# Patient Record
Sex: MALE | Race: WHITE | NOT HISPANIC OR LATINO | Employment: OTHER | URBAN - METROPOLITAN AREA
[De-identification: names, ages, dates, MRNs, and addresses within clinical notes are randomized per-mention and may not be internally consistent; named-entity substitution may affect disease eponyms.]

---

## 2023-01-01 ENCOUNTER — APPOINTMENT (OUTPATIENT)
Dept: RADIOLOGY | Facility: MEDICAL CENTER | Age: 63
DRG: 438 | End: 2023-01-01
Attending: STUDENT IN AN ORGANIZED HEALTH CARE EDUCATION/TRAINING PROGRAM
Payer: MEDICARE

## 2023-01-01 ENCOUNTER — APPOINTMENT (OUTPATIENT)
Dept: RADIOLOGY | Facility: MEDICAL CENTER | Age: 63
DRG: 438 | End: 2023-01-01
Attending: HOSPITALIST
Payer: MEDICARE

## 2023-01-01 ENCOUNTER — APPOINTMENT (OUTPATIENT)
Dept: RADIOLOGY | Facility: MEDICAL CENTER | Age: 63
DRG: 438 | End: 2023-01-01
Attending: INTERNAL MEDICINE
Payer: MEDICARE

## 2023-01-01 ENCOUNTER — ANESTHESIA EVENT (OUTPATIENT)
Dept: SURGERY | Facility: MEDICAL CENTER | Age: 63
DRG: 438 | End: 2023-01-01
Payer: MEDICARE

## 2023-01-01 ENCOUNTER — HOSPITAL ENCOUNTER (INPATIENT)
Facility: MEDICAL CENTER | Age: 63
LOS: 14 days | DRG: 438 | End: 2023-08-17
Attending: STUDENT IN AN ORGANIZED HEALTH CARE EDUCATION/TRAINING PROGRAM | Admitting: HOSPITALIST
Payer: MEDICARE

## 2023-01-01 ENCOUNTER — ANESTHESIA (OUTPATIENT)
Dept: SURGERY | Facility: MEDICAL CENTER | Age: 63
DRG: 438 | End: 2023-01-01
Payer: MEDICARE

## 2023-01-01 ENCOUNTER — APPOINTMENT (OUTPATIENT)
Dept: CARDIOLOGY | Facility: MEDICAL CENTER | Age: 63
DRG: 438 | End: 2023-01-01
Attending: INTERNAL MEDICINE
Payer: MEDICARE

## 2023-01-01 VITALS
RESPIRATION RATE: 12 BRPM | BODY MASS INDEX: 31.41 KG/M2 | HEART RATE: 77 BPM | HEIGHT: 69 IN | SYSTOLIC BLOOD PRESSURE: 100 MMHG | OXYGEN SATURATION: 82 % | DIASTOLIC BLOOD PRESSURE: 44 MMHG | WEIGHT: 212.08 LBS | TEMPERATURE: 98.4 F

## 2023-01-01 LAB
ALBUMIN SERPL BCP-MCNC: 2.3 G/DL (ref 3.2–4.9)
ALBUMIN SERPL BCP-MCNC: 2.5 G/DL (ref 3.2–4.9)
ALBUMIN SERPL BCP-MCNC: 2.5 G/DL (ref 3.2–4.9)
ALBUMIN SERPL BCP-MCNC: 2.6 G/DL (ref 3.2–4.9)
ALBUMIN SERPL BCP-MCNC: 2.8 G/DL (ref 3.2–4.9)
ALBUMIN SERPL BCP-MCNC: 2.8 G/DL (ref 3.2–4.9)
ALBUMIN SERPL BCP-MCNC: 2.9 G/DL (ref 3.2–4.9)
ALBUMIN SERPL BCP-MCNC: 2.9 G/DL (ref 3.2–4.9)
ALBUMIN SERPL BCP-MCNC: 3 G/DL (ref 3.2–4.9)
ALBUMIN SERPL BCP-MCNC: 3.8 G/DL (ref 3.2–4.9)
ALBUMIN SERPL BCP-MCNC: 4.1 G/DL (ref 3.2–4.9)
ALBUMIN SERPL BCP-MCNC: 4.3 G/DL (ref 3.2–4.9)
ALBUMIN/GLOB SERPL: 0.6 G/DL
ALBUMIN/GLOB SERPL: 0.6 G/DL
ALBUMIN/GLOB SERPL: 0.7 G/DL
ALBUMIN/GLOB SERPL: 0.8 G/DL
ALBUMIN/GLOB SERPL: 0.8 G/DL
ALBUMIN/GLOB SERPL: 0.9 G/DL
ALBUMIN/GLOB SERPL: 1 G/DL
ALBUMIN/GLOB SERPL: 1.1 G/DL
ALBUMIN/GLOB SERPL: 1.2 G/DL
ALBUMIN/GLOB SERPL: 1.2 G/DL
ALP SERPL-CCNC: 101 U/L (ref 30–99)
ALP SERPL-CCNC: 123 U/L (ref 30–99)
ALP SERPL-CCNC: 125 U/L (ref 30–99)
ALP SERPL-CCNC: 138 U/L (ref 30–99)
ALP SERPL-CCNC: 145 U/L (ref 30–99)
ALP SERPL-CCNC: 150 U/L (ref 30–99)
ALP SERPL-CCNC: 152 U/L (ref 30–99)
ALP SERPL-CCNC: 186 U/L (ref 30–99)
ALP SERPL-CCNC: 206 U/L (ref 30–99)
ALP SERPL-CCNC: 78 U/L (ref 30–99)
ALP SERPL-CCNC: 84 U/L (ref 30–99)
ALP SERPL-CCNC: 85 U/L (ref 30–99)
ALT SERPL-CCNC: 109 U/L (ref 2–50)
ALT SERPL-CCNC: 119 U/L (ref 2–50)
ALT SERPL-CCNC: 183 U/L (ref 2–50)
ALT SERPL-CCNC: 189 U/L (ref 2–50)
ALT SERPL-CCNC: 217 U/L (ref 2–50)
ALT SERPL-CCNC: 227 U/L (ref 2–50)
ALT SERPL-CCNC: 424 U/L (ref 2–50)
ALT SERPL-CCNC: 60 U/L (ref 2–50)
ALT SERPL-CCNC: 66 U/L (ref 2–50)
ALT SERPL-CCNC: 71 U/L (ref 2–50)
ALT SERPL-CCNC: 80 U/L (ref 2–50)
ALT SERPL-CCNC: 84 U/L (ref 2–50)
ANION GAP SERPL CALC-SCNC: 10 MMOL/L (ref 7–16)
ANION GAP SERPL CALC-SCNC: 11 MMOL/L (ref 7–16)
ANION GAP SERPL CALC-SCNC: 12 MMOL/L (ref 7–16)
ANION GAP SERPL CALC-SCNC: 12 MMOL/L (ref 7–16)
ANION GAP SERPL CALC-SCNC: 14 MMOL/L (ref 7–16)
ANION GAP SERPL CALC-SCNC: 14 MMOL/L (ref 7–16)
ANION GAP SERPL CALC-SCNC: 16 MMOL/L (ref 7–16)
ANION GAP SERPL CALC-SCNC: 16 MMOL/L (ref 7–16)
ANION GAP SERPL CALC-SCNC: 8 MMOL/L (ref 7–16)
ANION GAP SERPL CALC-SCNC: 9 MMOL/L (ref 7–16)
APPEARANCE UR: ABNORMAL
APPEARANCE UR: CLEAR
ARTERIAL PATENCY WRIST A: ABNORMAL
AST SERPL-CCNC: 127 U/L (ref 12–45)
AST SERPL-CCNC: 162 U/L (ref 12–45)
AST SERPL-CCNC: 260 U/L (ref 12–45)
AST SERPL-CCNC: 306 U/L (ref 12–45)
AST SERPL-CCNC: 35 U/L (ref 12–45)
AST SERPL-CCNC: 439 U/L (ref 12–45)
AST SERPL-CCNC: 45 U/L (ref 12–45)
AST SERPL-CCNC: 47 U/L (ref 12–45)
AST SERPL-CCNC: 47 U/L (ref 12–45)
AST SERPL-CCNC: 48 U/L (ref 12–45)
AST SERPL-CCNC: 56 U/L (ref 12–45)
AST SERPL-CCNC: 63 U/L (ref 12–45)
B PARAP IS1001 DNA NPH QL NAA+NON-PROBE: NOT DETECTED
B PERT.PT PRMT NPH QL NAA+NON-PROBE: NOT DETECTED
BACTERIA #/AREA URNS HPF: ABNORMAL /HPF
BACTERIA BLD CULT: NORMAL
BASE EXCESS BLDA CALC-SCNC: -2 MMOL/L (ref -4–3)
BASE EXCESS BLDA CALC-SCNC: -2 MMOL/L (ref -4–3)
BASE EXCESS BLDA CALC-SCNC: 10 MMOL/L (ref -4–3)
BASE EXCESS BLDA CALC-SCNC: 6 MMOL/L (ref -4–3)
BASE EXCESS BLDA CALC-SCNC: 6 MMOL/L (ref -4–3)
BASE EXCESS BLDA CALC-SCNC: 9 MMOL/L (ref -4–3)
BASOPHILS # BLD AUTO: 0.2 % (ref 0–1.8)
BASOPHILS # BLD AUTO: 0.2 % (ref 0–1.8)
BASOPHILS # BLD: 0.04 K/UL (ref 0–0.12)
BASOPHILS # BLD: 0.04 K/UL (ref 0–0.12)
BILIRUB SERPL-MCNC: 0.5 MG/DL (ref 0.1–1.5)
BILIRUB SERPL-MCNC: 0.6 MG/DL (ref 0.1–1.5)
BILIRUB SERPL-MCNC: 0.7 MG/DL (ref 0.1–1.5)
BILIRUB SERPL-MCNC: 0.7 MG/DL (ref 0.1–1.5)
BILIRUB SERPL-MCNC: 0.8 MG/DL (ref 0.1–1.5)
BILIRUB SERPL-MCNC: 0.8 MG/DL (ref 0.1–1.5)
BILIRUB SERPL-MCNC: 0.9 MG/DL (ref 0.1–1.5)
BILIRUB SERPL-MCNC: 1.1 MG/DL (ref 0.1–1.5)
BILIRUB SERPL-MCNC: 1.3 MG/DL (ref 0.1–1.5)
BILIRUB SERPL-MCNC: 1.4 MG/DL (ref 0.1–1.5)
BILIRUB SERPL-MCNC: 1.9 MG/DL (ref 0.1–1.5)
BILIRUB SERPL-MCNC: 3.7 MG/DL (ref 0.1–1.5)
BILIRUB UR QL STRIP.AUTO: NEGATIVE
BILIRUB UR QL STRIP.AUTO: NEGATIVE
BODY TEMPERATURE: ABNORMAL DEGREES
BUN SERPL-MCNC: 15 MG/DL (ref 8–22)
BUN SERPL-MCNC: 18 MG/DL (ref 8–22)
BUN SERPL-MCNC: 24 MG/DL (ref 8–22)
BUN SERPL-MCNC: 26 MG/DL (ref 8–22)
BUN SERPL-MCNC: 26 MG/DL (ref 8–22)
BUN SERPL-MCNC: 28 MG/DL (ref 8–22)
BUN SERPL-MCNC: 29 MG/DL (ref 8–22)
BUN SERPL-MCNC: 29 MG/DL (ref 8–22)
BUN SERPL-MCNC: 30 MG/DL (ref 8–22)
BUN SERPL-MCNC: 35 MG/DL (ref 8–22)
BUN SERPL-MCNC: 36 MG/DL (ref 8–22)
C PNEUM DNA NPH QL NAA+NON-PROBE: NOT DETECTED
CA-I SERPL-SCNC: 1.01 MMOL/L (ref 1.1–1.3)
CA-I SERPL-SCNC: 1.03 MMOL/L (ref 1.1–1.3)
CA-I SERPL-SCNC: 1.04 MMOL/L (ref 1.1–1.3)
CA-I SERPL-SCNC: 1.06 MMOL/L (ref 1.1–1.3)
CA-I SERPL-SCNC: 1.07 MMOL/L (ref 1.1–1.3)
CALCIUM ALBUM COR SERPL-MCNC: 7.7 MG/DL (ref 8.5–10.5)
CALCIUM ALBUM COR SERPL-MCNC: 8.7 MG/DL (ref 8.5–10.5)
CALCIUM ALBUM COR SERPL-MCNC: 9 MG/DL (ref 8.5–10.5)
CALCIUM ALBUM COR SERPL-MCNC: 9.1 MG/DL (ref 8.5–10.5)
CALCIUM ALBUM COR SERPL-MCNC: 9.3 MG/DL (ref 8.5–10.5)
CALCIUM ALBUM COR SERPL-MCNC: 9.3 MG/DL (ref 8.5–10.5)
CALCIUM ALBUM COR SERPL-MCNC: 9.4 MG/DL (ref 8.5–10.5)
CALCIUM ALBUM COR SERPL-MCNC: 9.4 MG/DL (ref 8.5–10.5)
CALCIUM ALBUM COR SERPL-MCNC: 9.5 MG/DL (ref 8.5–10.5)
CALCIUM ALBUM COR SERPL-MCNC: 9.7 MG/DL (ref 8.5–10.5)
CALCIUM SERPL-MCNC: 7.5 MG/DL (ref 8.4–10.2)
CALCIUM SERPL-MCNC: 7.8 MG/DL (ref 8.4–10.2)
CALCIUM SERPL-MCNC: 7.9 MG/DL (ref 8.4–10.2)
CALCIUM SERPL-MCNC: 8.1 MG/DL (ref 8.4–10.2)
CALCIUM SERPL-MCNC: 8.2 MG/DL (ref 8.4–10.2)
CALCIUM SERPL-MCNC: 8.3 MG/DL (ref 8.4–10.2)
CALCIUM SERPL-MCNC: 8.4 MG/DL (ref 8.4–10.2)
CALCIUM SERPL-MCNC: 8.7 MG/DL (ref 8.4–10.2)
CALCIUM SERPL-MCNC: 8.8 MG/DL (ref 8.4–10.2)
CALCIUM SERPL-MCNC: 9.2 MG/DL (ref 8.4–10.2)
CHLORIDE SERPL-SCNC: 101 MMOL/L (ref 96–112)
CHLORIDE SERPL-SCNC: 102 MMOL/L (ref 96–112)
CHLORIDE SERPL-SCNC: 103 MMOL/L (ref 96–112)
CHLORIDE SERPL-SCNC: 106 MMOL/L (ref 96–112)
CHLORIDE SERPL-SCNC: 106 MMOL/L (ref 96–112)
CHLORIDE SERPL-SCNC: 107 MMOL/L (ref 96–112)
CHLORIDE SERPL-SCNC: 107 MMOL/L (ref 96–112)
CHLORIDE SERPL-SCNC: 109 MMOL/L (ref 96–112)
CHLORIDE SERPL-SCNC: 109 MMOL/L (ref 96–112)
CHLORIDE SERPL-SCNC: 110 MMOL/L (ref 96–112)
CHLORIDE SERPL-SCNC: 111 MMOL/L (ref 96–112)
CHLORIDE SERPL-SCNC: 113 MMOL/L (ref 96–112)
CO2 BLDA-SCNC: 25 MMOL/L (ref 20–33)
CO2 BLDA-SCNC: 29 MMOL/L (ref 20–33)
CO2 BLDA-SCNC: 32 MMOL/L (ref 20–33)
CO2 BLDA-SCNC: 34 MMOL/L (ref 20–33)
CO2 BLDA-SCNC: 36 MMOL/L (ref 20–33)
CO2 BLDA-SCNC: 37 MMOL/L (ref 20–33)
CO2 BLDA-SCNC: 38 MMOL/L (ref 20–33)
CO2 BLDA-SCNC: 39 MMOL/L (ref 20–33)
CO2 SERPL-SCNC: 20 MMOL/L (ref 20–33)
CO2 SERPL-SCNC: 22 MMOL/L (ref 20–33)
CO2 SERPL-SCNC: 22 MMOL/L (ref 20–33)
CO2 SERPL-SCNC: 24 MMOL/L (ref 20–33)
CO2 SERPL-SCNC: 25 MMOL/L (ref 20–33)
CO2 SERPL-SCNC: 28 MMOL/L (ref 20–33)
CO2 SERPL-SCNC: 29 MMOL/L (ref 20–33)
CO2 SERPL-SCNC: 30 MMOL/L (ref 20–33)
CO2 SERPL-SCNC: 30 MMOL/L (ref 20–33)
CO2 SERPL-SCNC: 31 MMOL/L (ref 20–33)
CO2 SERPL-SCNC: 33 MMOL/L (ref 20–33)
COLOR UR: YELLOW
COLOR UR: YELLOW
CREAT SERPL-MCNC: 0.68 MG/DL (ref 0.5–1.4)
CREAT SERPL-MCNC: 0.68 MG/DL (ref 0.5–1.4)
CREAT SERPL-MCNC: 0.7 MG/DL (ref 0.5–1.4)
CREAT SERPL-MCNC: 0.74 MG/DL (ref 0.5–1.4)
CREAT SERPL-MCNC: 0.76 MG/DL (ref 0.5–1.4)
CREAT SERPL-MCNC: 0.78 MG/DL (ref 0.5–1.4)
CREAT SERPL-MCNC: 0.88 MG/DL (ref 0.5–1.4)
CREAT SERPL-MCNC: 0.93 MG/DL (ref 0.5–1.4)
CREAT SERPL-MCNC: 0.97 MG/DL (ref 0.5–1.4)
CREAT SERPL-MCNC: 1.01 MG/DL (ref 0.5–1.4)
CREAT SERPL-MCNC: 1.11 MG/DL (ref 0.5–1.4)
CREAT SERPL-MCNC: 1.12 MG/DL (ref 0.5–1.4)
CRP SERPL HS-MCNC: 10 MG/DL (ref 0–0.75)
CRP SERPL HS-MCNC: 19.73 MG/DL (ref 0–0.75)
D DIMER PPP IA.FEU-MCNC: 14.8 UG/ML (FEU) (ref 0–0.5)
DELSYS IDSYS: ABNORMAL
EKG IMPRESSION: NORMAL
EOSINOPHIL # BLD AUTO: 0.01 K/UL (ref 0–0.51)
EOSINOPHIL # BLD AUTO: 0.1 K/UL (ref 0–0.51)
EOSINOPHIL NFR BLD: 0 % (ref 0–6.9)
EOSINOPHIL NFR BLD: 0.6 % (ref 0–6.9)
EPI CELLS #/AREA URNS HPF: ABNORMAL /HPF
ERYTHROCYTE [DISTWIDTH] IN BLOOD BY AUTOMATED COUNT: 43.9 FL (ref 35.9–50)
ERYTHROCYTE [DISTWIDTH] IN BLOOD BY AUTOMATED COUNT: 45.2 FL (ref 35.9–50)
ERYTHROCYTE [DISTWIDTH] IN BLOOD BY AUTOMATED COUNT: 45.8 FL (ref 35.9–50)
ERYTHROCYTE [DISTWIDTH] IN BLOOD BY AUTOMATED COUNT: 46.6 FL (ref 35.9–50)
ERYTHROCYTE [DISTWIDTH] IN BLOOD BY AUTOMATED COUNT: 46.7 FL (ref 35.9–50)
ERYTHROCYTE [DISTWIDTH] IN BLOOD BY AUTOMATED COUNT: 46.8 FL (ref 35.9–50)
ERYTHROCYTE [DISTWIDTH] IN BLOOD BY AUTOMATED COUNT: 47.6 FL (ref 35.9–50)
ERYTHROCYTE [DISTWIDTH] IN BLOOD BY AUTOMATED COUNT: 48 FL (ref 35.9–50)
ERYTHROCYTE [DISTWIDTH] IN BLOOD BY AUTOMATED COUNT: 48 FL (ref 35.9–50)
ERYTHROCYTE [DISTWIDTH] IN BLOOD BY AUTOMATED COUNT: 50.4 FL (ref 35.9–50)
ERYTHROCYTE [DISTWIDTH] IN BLOOD BY AUTOMATED COUNT: 50.6 FL (ref 35.9–50)
ERYTHROCYTE [DISTWIDTH] IN BLOOD BY AUTOMATED COUNT: 50.8 FL (ref 35.9–50)
ERYTHROCYTE [DISTWIDTH] IN BLOOD BY AUTOMATED COUNT: 51 FL (ref 35.9–50)
ERYTHROCYTE [DISTWIDTH] IN BLOOD BY AUTOMATED COUNT: 52.6 FL (ref 35.9–50)
ERYTHROCYTE [DISTWIDTH] IN BLOOD BY AUTOMATED COUNT: 53.4 FL (ref 35.9–50)
FLUAV RNA NPH QL NAA+NON-PROBE: NOT DETECTED
FLUAV RNA SPEC QL NAA+PROBE: NEGATIVE
FLUAV RNA SPEC QL NAA+PROBE: NEGATIVE
FLUBV RNA NPH QL NAA+NON-PROBE: NOT DETECTED
FLUBV RNA SPEC QL NAA+PROBE: NEGATIVE
FLUBV RNA SPEC QL NAA+PROBE: NEGATIVE
GFR SERPLBLD CREATININE-BSD FMLA CKD-EPI: 100 ML/MIN/1.73 M 2
GFR SERPLBLD CREATININE-BSD FMLA CKD-EPI: 100 ML/MIN/1.73 M 2
GFR SERPLBLD CREATININE-BSD FMLA CKD-EPI: 102 ML/MIN/1.73 M 2
GFR SERPLBLD CREATININE-BSD FMLA CKD-EPI: 103 ML/MIN/1.73 M 2
GFR SERPLBLD CREATININE-BSD FMLA CKD-EPI: 103 ML/MIN/1.73 M 2
GFR SERPLBLD CREATININE-BSD FMLA CKD-EPI: 73 ML/MIN/1.73 M 2
GFR SERPLBLD CREATININE-BSD FMLA CKD-EPI: 74 ML/MIN/1.73 M 2
GFR SERPLBLD CREATININE-BSD FMLA CKD-EPI: 82 ML/MIN/1.73 M 2
GFR SERPLBLD CREATININE-BSD FMLA CKD-EPI: 87 ML/MIN/1.73 M 2
GFR SERPLBLD CREATININE-BSD FMLA CKD-EPI: 91 ML/MIN/1.73 M 2
GFR SERPLBLD CREATININE-BSD FMLA CKD-EPI: 95 ML/MIN/1.73 M 2
GFR SERPLBLD CREATININE-BSD FMLA CKD-EPI: 99 ML/MIN/1.73 M 2
GLOBULIN SER CALC-MCNC: 3 G/DL (ref 1.9–3.5)
GLOBULIN SER CALC-MCNC: 3.2 G/DL (ref 1.9–3.5)
GLOBULIN SER CALC-MCNC: 3.4 G/DL (ref 1.9–3.5)
GLOBULIN SER CALC-MCNC: 3.5 G/DL (ref 1.9–3.5)
GLOBULIN SER CALC-MCNC: 3.5 G/DL (ref 1.9–3.5)
GLOBULIN SER CALC-MCNC: 3.6 G/DL (ref 1.9–3.5)
GLOBULIN SER CALC-MCNC: 3.7 G/DL (ref 1.9–3.5)
GLOBULIN SER CALC-MCNC: 3.7 G/DL (ref 1.9–3.5)
GLOBULIN SER CALC-MCNC: 3.8 G/DL (ref 1.9–3.5)
GLOBULIN SER CALC-MCNC: 3.9 G/DL (ref 1.9–3.5)
GLOBULIN SER CALC-MCNC: 4 G/DL (ref 1.9–3.5)
GLOBULIN SER CALC-MCNC: 4.2 G/DL (ref 1.9–3.5)
GLUCOSE BLD STRIP.AUTO-MCNC: 129 MG/DL (ref 65–99)
GLUCOSE BLD STRIP.AUTO-MCNC: 131 MG/DL (ref 65–99)
GLUCOSE BLD STRIP.AUTO-MCNC: 134 MG/DL (ref 65–99)
GLUCOSE BLD STRIP.AUTO-MCNC: 138 MG/DL (ref 65–99)
GLUCOSE BLD STRIP.AUTO-MCNC: 142 MG/DL (ref 65–99)
GLUCOSE BLD STRIP.AUTO-MCNC: 143 MG/DL (ref 65–99)
GLUCOSE BLD STRIP.AUTO-MCNC: 145 MG/DL (ref 65–99)
GLUCOSE BLD STRIP.AUTO-MCNC: 146 MG/DL (ref 65–99)
GLUCOSE BLD STRIP.AUTO-MCNC: 149 MG/DL (ref 65–99)
GLUCOSE BLD STRIP.AUTO-MCNC: 150 MG/DL (ref 65–99)
GLUCOSE BLD STRIP.AUTO-MCNC: 150 MG/DL (ref 65–99)
GLUCOSE BLD STRIP.AUTO-MCNC: 155 MG/DL (ref 65–99)
GLUCOSE BLD STRIP.AUTO-MCNC: 157 MG/DL (ref 65–99)
GLUCOSE BLD STRIP.AUTO-MCNC: 159 MG/DL (ref 65–99)
GLUCOSE BLD STRIP.AUTO-MCNC: 163 MG/DL (ref 65–99)
GLUCOSE BLD STRIP.AUTO-MCNC: 163 MG/DL (ref 65–99)
GLUCOSE BLD STRIP.AUTO-MCNC: 166 MG/DL (ref 65–99)
GLUCOSE BLD STRIP.AUTO-MCNC: 167 MG/DL (ref 65–99)
GLUCOSE BLD STRIP.AUTO-MCNC: 168 MG/DL (ref 65–99)
GLUCOSE BLD STRIP.AUTO-MCNC: 168 MG/DL (ref 65–99)
GLUCOSE BLD STRIP.AUTO-MCNC: 169 MG/DL (ref 65–99)
GLUCOSE BLD STRIP.AUTO-MCNC: 169 MG/DL (ref 65–99)
GLUCOSE BLD STRIP.AUTO-MCNC: 170 MG/DL (ref 65–99)
GLUCOSE BLD STRIP.AUTO-MCNC: 177 MG/DL (ref 65–99)
GLUCOSE BLD STRIP.AUTO-MCNC: 179 MG/DL (ref 65–99)
GLUCOSE BLD STRIP.AUTO-MCNC: 189 MG/DL (ref 65–99)
GLUCOSE BLD STRIP.AUTO-MCNC: 190 MG/DL (ref 65–99)
GLUCOSE BLD STRIP.AUTO-MCNC: 191 MG/DL (ref 65–99)
GLUCOSE BLD STRIP.AUTO-MCNC: 191 MG/DL (ref 65–99)
GLUCOSE BLD STRIP.AUTO-MCNC: 197 MG/DL (ref 65–99)
GLUCOSE BLD STRIP.AUTO-MCNC: 203 MG/DL (ref 65–99)
GLUCOSE BLD STRIP.AUTO-MCNC: 203 MG/DL (ref 65–99)
GLUCOSE BLD STRIP.AUTO-MCNC: 220 MG/DL (ref 65–99)
GLUCOSE BLD STRIP.AUTO-MCNC: 227 MG/DL (ref 65–99)
GLUCOSE BLD STRIP.AUTO-MCNC: 229 MG/DL (ref 65–99)
GLUCOSE BLD STRIP.AUTO-MCNC: 232 MG/DL (ref 65–99)
GLUCOSE BLD STRIP.AUTO-MCNC: 234 MG/DL (ref 65–99)
GLUCOSE BLD STRIP.AUTO-MCNC: 238 MG/DL (ref 65–99)
GLUCOSE BLD STRIP.AUTO-MCNC: 241 MG/DL (ref 65–99)
GLUCOSE BLD STRIP.AUTO-MCNC: 245 MG/DL (ref 65–99)
GLUCOSE BLD STRIP.AUTO-MCNC: 249 MG/DL (ref 65–99)
GLUCOSE BLD STRIP.AUTO-MCNC: 253 MG/DL (ref 65–99)
GLUCOSE BLD STRIP.AUTO-MCNC: 258 MG/DL (ref 65–99)
GLUCOSE BLD STRIP.AUTO-MCNC: 265 MG/DL (ref 65–99)
GLUCOSE BLD STRIP.AUTO-MCNC: 268 MG/DL (ref 65–99)
GLUCOSE BLD STRIP.AUTO-MCNC: 271 MG/DL (ref 65–99)
GLUCOSE BLD STRIP.AUTO-MCNC: 271 MG/DL (ref 65–99)
GLUCOSE BLD STRIP.AUTO-MCNC: 272 MG/DL (ref 65–99)
GLUCOSE BLD STRIP.AUTO-MCNC: 281 MG/DL (ref 65–99)
GLUCOSE BLD STRIP.AUTO-MCNC: 283 MG/DL (ref 65–99)
GLUCOSE BLD STRIP.AUTO-MCNC: 287 MG/DL (ref 65–99)
GLUCOSE BLD STRIP.AUTO-MCNC: 299 MG/DL (ref 65–99)
GLUCOSE SERPL-MCNC: 131 MG/DL (ref 65–99)
GLUCOSE SERPL-MCNC: 142 MG/DL (ref 65–99)
GLUCOSE SERPL-MCNC: 142 MG/DL (ref 65–99)
GLUCOSE SERPL-MCNC: 166 MG/DL (ref 65–99)
GLUCOSE SERPL-MCNC: 171 MG/DL (ref 65–99)
GLUCOSE SERPL-MCNC: 181 MG/DL (ref 65–99)
GLUCOSE SERPL-MCNC: 200 MG/DL (ref 65–99)
GLUCOSE SERPL-MCNC: 203 MG/DL (ref 65–99)
GLUCOSE SERPL-MCNC: 218 MG/DL (ref 65–99)
GLUCOSE SERPL-MCNC: 249 MG/DL (ref 65–99)
GLUCOSE SERPL-MCNC: 252 MG/DL (ref 65–99)
GLUCOSE SERPL-MCNC: 257 MG/DL (ref 65–99)
GLUCOSE SERPL-MCNC: 291 MG/DL (ref 65–99)
GLUCOSE SERPL-MCNC: 327 MG/DL (ref 65–99)
GLUCOSE UR STRIP.AUTO-MCNC: NEGATIVE MG/DL
GLUCOSE UR STRIP.AUTO-MCNC: NEGATIVE MG/DL
HADV DNA NPH QL NAA+NON-PROBE: NOT DETECTED
HCO3 BLDA-SCNC: 23.8 MMOL/L (ref 17–25)
HCO3 BLDA-SCNC: 27.1 MMOL/L (ref 17–25)
HCO3 BLDA-SCNC: 31 MMOL/L (ref 17–25)
HCO3 BLDA-SCNC: 32.4 MMOL/L (ref 17–25)
HCO3 BLDA-SCNC: 34.1 MMOL/L (ref 17–25)
HCO3 BLDA-SCNC: 35.6 MMOL/L (ref 17–25)
HCO3 BLDA-SCNC: 35.9 MMOL/L (ref 17–25)
HCO3 BLDA-SCNC: 36.3 MMOL/L (ref 17–25)
HCOV 229E RNA NPH QL NAA+NON-PROBE: NOT DETECTED
HCOV HKU1 RNA NPH QL NAA+NON-PROBE: NOT DETECTED
HCOV NL63 RNA NPH QL NAA+NON-PROBE: NOT DETECTED
HCOV OC43 RNA NPH QL NAA+NON-PROBE: NOT DETECTED
HCT VFR BLD AUTO: 34.2 % (ref 42–52)
HCT VFR BLD AUTO: 37.5 % (ref 42–52)
HCT VFR BLD AUTO: 38.7 % (ref 42–52)
HCT VFR BLD AUTO: 38.8 % (ref 42–52)
HCT VFR BLD AUTO: 39.5 % (ref 42–52)
HCT VFR BLD AUTO: 39.5 % (ref 42–52)
HCT VFR BLD AUTO: 40.5 % (ref 42–52)
HCT VFR BLD AUTO: 42.7 % (ref 42–52)
HCT VFR BLD AUTO: 42.9 % (ref 42–52)
HCT VFR BLD AUTO: 43.5 % (ref 42–52)
HCT VFR BLD AUTO: 44.5 % (ref 42–52)
HCT VFR BLD AUTO: 45.1 % (ref 42–52)
HCT VFR BLD AUTO: 45.2 % (ref 42–52)
HCT VFR BLD AUTO: 46.6 % (ref 42–52)
HCT VFR BLD AUTO: 48.2 % (ref 42–52)
HGB BLD-MCNC: 11 G/DL (ref 14–18)
HGB BLD-MCNC: 11.1 G/DL (ref 14–18)
HGB BLD-MCNC: 11.5 G/DL (ref 14–18)
HGB BLD-MCNC: 11.9 G/DL (ref 14–18)
HGB BLD-MCNC: 12.4 G/DL (ref 14–18)
HGB BLD-MCNC: 12.8 G/DL (ref 14–18)
HGB BLD-MCNC: 12.8 G/DL (ref 14–18)
HGB BLD-MCNC: 13 G/DL (ref 14–18)
HGB BLD-MCNC: 13.3 G/DL (ref 14–18)
HGB BLD-MCNC: 13.5 G/DL (ref 14–18)
HGB BLD-MCNC: 13.7 G/DL (ref 14–18)
HGB BLD-MCNC: 13.9 G/DL (ref 14–18)
HGB BLD-MCNC: 14.3 G/DL (ref 14–18)
HGB BLD-MCNC: 15.2 G/DL (ref 14–18)
HGB BLD-MCNC: 15.4 G/DL (ref 14–18)
HMPV RNA NPH QL NAA+NON-PROBE: NOT DETECTED
HOROWITZ INDEX BLDA+IHG-RTO: 112 MM[HG]
HOROWITZ INDEX BLDA+IHG-RTO: 117 MM[HG]
HOROWITZ INDEX BLDA+IHG-RTO: 167 MM[HG]
HOROWITZ INDEX BLDA+IHG-RTO: 256 MM[HG]
HOROWITZ INDEX BLDA+IHG-RTO: 74 MM[HG]
HPIV1 RNA NPH QL NAA+NON-PROBE: NOT DETECTED
HPIV2 RNA NPH QL NAA+NON-PROBE: NOT DETECTED
HPIV3 RNA NPH QL NAA+NON-PROBE: NOT DETECTED
HPIV4 RNA NPH QL NAA+NON-PROBE: NOT DETECTED
IMM GRANULOCYTES # BLD AUTO: 0.09 K/UL (ref 0–0.11)
IMM GRANULOCYTES # BLD AUTO: 0.15 K/UL (ref 0–0.11)
IMM GRANULOCYTES NFR BLD AUTO: 0.5 % (ref 0–0.9)
IMM GRANULOCYTES NFR BLD AUTO: 0.6 % (ref 0–0.9)
KETONES UR STRIP.AUTO-MCNC: NEGATIVE MG/DL
KETONES UR STRIP.AUTO-MCNC: NEGATIVE MG/DL
LACTATE BLD-SCNC: 0.7 MMOL/L (ref 0.5–2)
LACTATE BLD-SCNC: 1.1 MMOL/L (ref 0.5–2)
LACTATE BLD-SCNC: 1.2 MMOL/L (ref 0.5–2)
LACTATE BLD-SCNC: 1.2 MMOL/L (ref 0.5–2)
LACTATE BLD-SCNC: 1.5 MMOL/L (ref 0.5–2)
LACTATE BLD-SCNC: 2.3 MMOL/L (ref 0.5–2)
LACTATE BLD-SCNC: 2.4 MMOL/L (ref 0.5–2)
LACTATE BLD-SCNC: 3.1 MMOL/L (ref 0.5–2)
LACTATE SERPL-SCNC: 1.2 MMOL/L (ref 0.5–2)
LACTATE SERPL-SCNC: 1.2 MMOL/L (ref 0.5–2)
LACTATE SERPL-SCNC: 1.4 MMOL/L (ref 0.5–2)
LACTATE SERPL-SCNC: 1.5 MMOL/L (ref 0.5–2)
LACTATE SERPL-SCNC: 1.7 MMOL/L (ref 0.5–2)
LACTATE SERPL-SCNC: 2 MMOL/L (ref 0.5–2)
LACTATE SERPL-SCNC: 2.8 MMOL/L (ref 0.5–2)
LACTATE SERPL-SCNC: 3.2 MMOL/L (ref 0.5–2)
LACTATE SERPL-SCNC: 3.3 MMOL/L (ref 0.5–2)
LACTATE SERPL-SCNC: 3.3 MMOL/L (ref 0.5–2)
LACTATE SERPL-SCNC: 3.5 MMOL/L (ref 0.5–2)
LACTATE SERPL-SCNC: 4.3 MMOL/L (ref 0.5–2)
LACTATE SERPL-SCNC: 4.5 MMOL/L (ref 0.5–2)
LDH SERPL L TO P-CCNC: 466 U/L (ref 107–266)
LEUKOCYTE ESTERASE UR QL STRIP.AUTO: NEGATIVE
LEUKOCYTE ESTERASE UR QL STRIP.AUTO: NEGATIVE
LIPASE SERPL-CCNC: 1310 U/L (ref 11–82)
LIPASE SERPL-CCNC: 204 U/L (ref 11–82)
LIPASE SERPL-CCNC: 31 U/L (ref 11–82)
LIPASE SERPL-CCNC: 67 U/L (ref 11–82)
LIPASE SERPL-CCNC: 75 U/L (ref 11–82)
LIPASE SERPL-CCNC: >3000 U/L (ref 11–82)
LPM ILPM: 10 LPM
LPM ILPM: 15 LPM
LPM ILPM: 3 LPM
LPM ILPM: 3 LPM
LPM ILPM: 5 LPM
LV EJECT FRACT  99904: 55
LYMPHOCYTES # BLD AUTO: 0.4 K/UL (ref 1–4.8)
LYMPHOCYTES # BLD AUTO: 2.2 K/UL (ref 1–4.8)
LYMPHOCYTES NFR BLD: 1.6 % (ref 22–41)
LYMPHOCYTES NFR BLD: 12.3 % (ref 22–41)
M PNEUMO DNA NPH QL NAA+NON-PROBE: NOT DETECTED
MAGNESIUM SERPL-MCNC: 1.5 MG/DL (ref 1.5–2.5)
MAGNESIUM SERPL-MCNC: 1.6 MG/DL (ref 1.5–2.5)
MAGNESIUM SERPL-MCNC: 1.7 MG/DL (ref 1.5–2.5)
MAGNESIUM SERPL-MCNC: 2.2 MG/DL (ref 1.5–2.5)
MAGNESIUM SERPL-MCNC: 2.3 MG/DL (ref 1.5–2.5)
MAGNESIUM SERPL-MCNC: 2.4 MG/DL (ref 1.5–2.5)
MAGNESIUM SERPL-MCNC: 2.4 MG/DL (ref 1.5–2.5)
MAGNESIUM SERPL-MCNC: 2.5 MG/DL (ref 1.5–2.5)
MAGNESIUM SERPL-MCNC: 2.7 MG/DL (ref 1.5–2.5)
MCH RBC QN AUTO: 26.6 PG (ref 27–33)
MCH RBC QN AUTO: 26.7 PG (ref 27–33)
MCH RBC QN AUTO: 26.9 PG (ref 27–33)
MCH RBC QN AUTO: 27 PG (ref 27–33)
MCH RBC QN AUTO: 27 PG (ref 27–33)
MCH RBC QN AUTO: 27.3 PG (ref 27–33)
MCH RBC QN AUTO: 27.4 PG (ref 27–33)
MCH RBC QN AUTO: 27.5 PG (ref 27–33)
MCH RBC QN AUTO: 27.6 PG (ref 27–33)
MCH RBC QN AUTO: 27.7 PG (ref 27–33)
MCH RBC QN AUTO: 27.7 PG (ref 27–33)
MCH RBC QN AUTO: 28.1 PG (ref 27–33)
MCHC RBC AUTO-ENTMCNC: 29.4 G/DL (ref 32.3–36.5)
MCHC RBC AUTO-ENTMCNC: 29.6 G/DL (ref 32.3–36.5)
MCHC RBC AUTO-ENTMCNC: 29.7 G/DL (ref 32.3–36.5)
MCHC RBC AUTO-ENTMCNC: 30.1 G/DL (ref 32.3–36.5)
MCHC RBC AUTO-ENTMCNC: 30.4 G/DL (ref 32.3–36.5)
MCHC RBC AUTO-ENTMCNC: 31 G/DL (ref 32.3–36.5)
MCHC RBC AUTO-ENTMCNC: 31.2 G/DL (ref 32.3–36.5)
MCHC RBC AUTO-ENTMCNC: 31.6 G/DL (ref 32.3–36.5)
MCHC RBC AUTO-ENTMCNC: 31.6 G/DL (ref 32.3–36.5)
MCHC RBC AUTO-ENTMCNC: 32 G/DL (ref 32.3–36.5)
MCHC RBC AUTO-ENTMCNC: 32 G/DL (ref 32.3–36.5)
MCHC RBC AUTO-ENTMCNC: 32.1 G/DL (ref 32.3–36.5)
MCHC RBC AUTO-ENTMCNC: 32.2 G/DL (ref 32.3–36.5)
MCHC RBC AUTO-ENTMCNC: 32.4 G/DL (ref 32.3–36.5)
MCHC RBC AUTO-ENTMCNC: 32.6 G/DL (ref 32.3–36.5)
MCV RBC AUTO: 84.6 FL (ref 81.4–97.8)
MCV RBC AUTO: 85.1 FL (ref 81.4–97.8)
MCV RBC AUTO: 85.9 FL (ref 81.4–97.8)
MCV RBC AUTO: 86.4 FL (ref 81.4–97.8)
MCV RBC AUTO: 86.6 FL (ref 81.4–97.8)
MCV RBC AUTO: 86.7 FL (ref 81.4–97.8)
MCV RBC AUTO: 86.9 FL (ref 81.4–97.8)
MCV RBC AUTO: 87.1 FL (ref 81.4–97.8)
MCV RBC AUTO: 87.8 FL (ref 81.4–97.8)
MCV RBC AUTO: 88.4 FL (ref 81.4–97.8)
MCV RBC AUTO: 88.5 FL (ref 81.4–97.8)
MCV RBC AUTO: 89.8 FL (ref 81.4–97.8)
MCV RBC AUTO: 90.1 FL (ref 81.4–97.8)
MCV RBC AUTO: 90.2 FL (ref 81.4–97.8)
MCV RBC AUTO: 91.7 FL (ref 81.4–97.8)
MICRO URNS: ABNORMAL
MICRO URNS: NORMAL
MONOCYTES # BLD AUTO: 1.1 K/UL (ref 0–0.85)
MONOCYTES # BLD AUTO: 1.1 K/UL (ref 0–0.85)
MONOCYTES NFR BLD AUTO: 4.4 % (ref 0–13.4)
MONOCYTES NFR BLD AUTO: 6.1 % (ref 0–13.4)
MUCOUS THREADS #/AREA URNS HPF: ABNORMAL /HPF
NEUTROPHILS # BLD AUTO: 14.41 K/UL (ref 1.82–7.42)
NEUTROPHILS # BLD AUTO: 23.15 K/UL (ref 1.82–7.42)
NEUTROPHILS NFR BLD: 80.3 % (ref 44–72)
NEUTROPHILS NFR BLD: 93.2 % (ref 44–72)
NITRITE UR QL STRIP.AUTO: NEGATIVE
NITRITE UR QL STRIP.AUTO: NEGATIVE
NRBC # BLD AUTO: 0 K/UL
NRBC # BLD AUTO: 0 K/UL
NRBC BLD-RTO: 0 /100 WBC (ref 0–0.2)
NRBC BLD-RTO: 0 /100 WBC (ref 0–0.2)
NT-PROBNP SERPL IA-MCNC: 1663 PG/ML (ref 0–125)
NT-PROBNP SERPL IA-MCNC: 4379 PG/ML (ref 0–125)
NT-PROBNP SERPL IA-MCNC: 6190 PG/ML (ref 0–125)
O2/TOTAL GAS SETTING VFR VENT: 32 %
O2/TOTAL GAS SETTING VFR VENT: 50 %
O2/TOTAL GAS SETTING VFR VENT: 60 %
O2/TOTAL GAS SETTING VFR VENT: 70 %
O2/TOTAL GAS SETTING VFR VENT: 80 %
PCO2 BLDA: 39.5 MMHG (ref 26–37)
PCO2 BLDA: 45.2 MMHG (ref 26–37)
PCO2 BLDA: 46.8 MMHG (ref 26–37)
PCO2 BLDA: 46.8 MMHG (ref 26–37)
PCO2 BLDA: 49.1 MMHG (ref 26–37)
PCO2 BLDA: 61.5 MMHG (ref 26–37)
PCO2 BLDA: 66.3 MMHG (ref 26–37)
PCO2 BLDA: 86.7 MMHG (ref 26–37)
PH BLDA: 7.22 [PH] (ref 7.4–7.5)
PH BLDA: 7.23 [PH] (ref 7.4–7.5)
PH BLDA: 7.33 [PH] (ref 7.4–7.5)
PH BLDA: 7.37 [PH] (ref 7.4–7.5)
PH BLDA: 7.43 [PH] (ref 7.4–7.5)
PH BLDA: 7.47 [PH] (ref 7.4–7.5)
PH BLDA: 7.47 [PH] (ref 7.4–7.5)
PH BLDA: 7.52 [PH] (ref 7.4–7.5)
PH TEMP ADJ BLDA: 7.22 [PH] (ref 7.4–7.5)
PH TEMP ADJ BLDA: 7.22 [PH] (ref 7.4–7.5)
PH TEMP ADJ BLDA: 7.38 [PH] (ref 7.4–7.5)
PH TEMP ADJ BLDA: 7.44 [PH] (ref 7.4–7.5)
PH TEMP ADJ BLDA: 7.46 [PH] (ref 7.4–7.5)
PH TEMP ADJ BLDA: 7.52 [PH] (ref 7.4–7.5)
PH UR STRIP.AUTO: 5 [PH] (ref 5–8)
PH UR STRIP.AUTO: 8 [PH] (ref 5–8)
PHOSPHATE SERPL-MCNC: 1 MG/DL (ref 2.5–4.5)
PHOSPHATE SERPL-MCNC: 1.6 MG/DL (ref 2.5–4.5)
PHOSPHATE SERPL-MCNC: 1.6 MG/DL (ref 2.5–4.5)
PHOSPHATE SERPL-MCNC: 1.9 MG/DL (ref 2.5–4.5)
PHOSPHATE SERPL-MCNC: 2.1 MG/DL (ref 2.5–4.5)
PHOSPHATE SERPL-MCNC: 2.2 MG/DL (ref 2.5–4.5)
PHOSPHATE SERPL-MCNC: 2.4 MG/DL (ref 2.5–4.5)
PHOSPHATE SERPL-MCNC: 2.7 MG/DL (ref 2.5–4.5)
PHOSPHATE SERPL-MCNC: 3.6 MG/DL (ref 2.5–4.5)
PLATELET # BLD AUTO: 205 K/UL (ref 164–446)
PLATELET # BLD AUTO: 215 K/UL (ref 164–446)
PLATELET # BLD AUTO: 232 K/UL (ref 164–446)
PLATELET # BLD AUTO: 245 K/UL (ref 164–446)
PLATELET # BLD AUTO: 257 K/UL (ref 164–446)
PLATELET # BLD AUTO: 262 K/UL (ref 164–446)
PLATELET # BLD AUTO: 263 K/UL (ref 164–446)
PLATELET # BLD AUTO: 273 K/UL (ref 164–446)
PLATELET # BLD AUTO: 283 K/UL (ref 164–446)
PLATELET # BLD AUTO: 288 K/UL (ref 164–446)
PLATELET # BLD AUTO: 307 K/UL (ref 164–446)
PLATELET # BLD AUTO: 315 K/UL (ref 164–446)
PLATELET # BLD AUTO: 320 K/UL (ref 164–446)
PLATELET # BLD AUTO: 320 K/UL (ref 164–446)
PLATELET # BLD AUTO: 413 K/UL (ref 164–446)
PMV BLD AUTO: 10 FL (ref 9–12.9)
PMV BLD AUTO: 10.1 FL (ref 9–12.9)
PMV BLD AUTO: 10.2 FL (ref 9–12.9)
PMV BLD AUTO: 10.3 FL (ref 9–12.9)
PMV BLD AUTO: 10.6 FL (ref 9–12.9)
PMV BLD AUTO: 10.7 FL (ref 9–12.9)
PMV BLD AUTO: 11 FL (ref 9–12.9)
PMV BLD AUTO: 11.5 FL (ref 9–12.9)
PMV BLD AUTO: 11.5 FL (ref 9–12.9)
PMV BLD AUTO: 11.7 FL (ref 9–12.9)
PMV BLD AUTO: 11.8 FL (ref 9–12.9)
PO2 BLDA: 100 MMHG (ref 64–87)
PO2 BLDA: 20 MMHG (ref 64–87)
PO2 BLDA: 47 MMHG (ref 64–87)
PO2 BLDA: 56 MMHG (ref 64–87)
PO2 BLDA: 59 MMHG (ref 64–87)
PO2 BLDA: 82 MMHG (ref 64–87)
PO2 BLDA: 82 MMHG (ref 64–87)
PO2 BLDA: 95 MMHG (ref 64–87)
PO2 TEMP ADJ BLDA: 20 MMHG (ref 64–87)
PO2 TEMP ADJ BLDA: 47 MMHG (ref 64–87)
PO2 TEMP ADJ BLDA: 63 MMHG (ref 64–87)
PO2 TEMP ADJ BLDA: 64 MMHG (ref 64–87)
PO2 TEMP ADJ BLDA: 80 MMHG (ref 64–87)
PO2 TEMP ADJ BLDA: 86 MMHG (ref 64–87)
POTASSIUM SERPL-SCNC: 3.2 MMOL/L (ref 3.6–5.5)
POTASSIUM SERPL-SCNC: 3.4 MMOL/L (ref 3.6–5.5)
POTASSIUM SERPL-SCNC: 3.5 MMOL/L (ref 3.6–5.5)
POTASSIUM SERPL-SCNC: 3.5 MMOL/L (ref 3.6–5.5)
POTASSIUM SERPL-SCNC: 3.6 MMOL/L (ref 3.6–5.5)
POTASSIUM SERPL-SCNC: 3.7 MMOL/L (ref 3.6–5.5)
POTASSIUM SERPL-SCNC: 3.8 MMOL/L (ref 3.6–5.5)
POTASSIUM SERPL-SCNC: 3.8 MMOL/L (ref 3.6–5.5)
POTASSIUM SERPL-SCNC: 3.9 MMOL/L (ref 3.6–5.5)
POTASSIUM SERPL-SCNC: 4 MMOL/L (ref 3.6–5.5)
POTASSIUM SERPL-SCNC: 4.2 MMOL/L (ref 3.6–5.5)
POTASSIUM SERPL-SCNC: 4.3 MMOL/L (ref 3.6–5.5)
PREALB SERPL-MCNC: 6.4 MG/DL (ref 18–38)
PROCALCITONIN SERPL-MCNC: 0.43 NG/ML
PROCALCITONIN SERPL-MCNC: 0.81 NG/ML
PROCALCITONIN SERPL-MCNC: 0.99 NG/ML
PROCALCITONIN SERPL-MCNC: 1.32 NG/ML
PROCALCITONIN SERPL-MCNC: 1.69 NG/ML
PROCALCITONIN SERPL-MCNC: 2.66 NG/ML
PROCALCITONIN SERPL-MCNC: 2.8 NG/ML
PROCALCITONIN SERPL-MCNC: 3.27 NG/ML
PROT SERPL-MCNC: 5.9 G/DL (ref 6–8.2)
PROT SERPL-MCNC: 6.2 G/DL (ref 6–8.2)
PROT SERPL-MCNC: 6.2 G/DL (ref 6–8.2)
PROT SERPL-MCNC: 6.3 G/DL (ref 6–8.2)
PROT SERPL-MCNC: 6.4 G/DL (ref 6–8.2)
PROT SERPL-MCNC: 6.6 G/DL (ref 6–8.2)
PROT SERPL-MCNC: 6.7 G/DL (ref 6–8.2)
PROT SERPL-MCNC: 7 G/DL (ref 6–8.2)
PROT SERPL-MCNC: 7.7 G/DL (ref 6–8.2)
PROT SERPL-MCNC: 7.8 G/DL (ref 6–8.2)
PROT UR QL STRIP: 100 MG/DL
PROT UR QL STRIP: NEGATIVE MG/DL
RBC # BLD AUTO: 3.98 M/UL (ref 4.7–6.1)
RBC # BLD AUTO: 4.16 M/UL (ref 4.7–6.1)
RBC # BLD AUTO: 4.22 M/UL (ref 4.7–6.1)
RBC # BLD AUTO: 4.4 M/UL (ref 4.7–6.1)
RBC # BLD AUTO: 4.42 M/UL (ref 4.7–6.1)
RBC # BLD AUTO: 4.64 M/UL (ref 4.7–6.1)
RBC # BLD AUTO: 4.69 M/UL (ref 4.7–6.1)
RBC # BLD AUTO: 4.82 M/UL (ref 4.7–6.1)
RBC # BLD AUTO: 4.85 M/UL (ref 4.7–6.1)
RBC # BLD AUTO: 4.9 M/UL (ref 4.7–6.1)
RBC # BLD AUTO: 5.1 M/UL (ref 4.7–6.1)
RBC # BLD AUTO: 5.14 M/UL (ref 4.7–6.1)
RBC # BLD AUTO: 5.2 M/UL (ref 4.7–6.1)
RBC # BLD AUTO: 5.51 M/UL (ref 4.7–6.1)
RBC # BLD AUTO: 5.56 M/UL (ref 4.7–6.1)
RBC # URNS HPF: ABNORMAL /HPF
RBC UR QL AUTO: ABNORMAL
RBC UR QL AUTO: NEGATIVE
RSV RNA NPH QL NAA+NON-PROBE: NOT DETECTED
RSV RNA SPEC QL NAA+PROBE: NEGATIVE
RSV RNA SPEC QL NAA+PROBE: NEGATIVE
RV+EV RNA NPH QL NAA+NON-PROBE: NOT DETECTED
SAO2 % BLDA: 28 % (ref 93–99)
SAO2 % BLDA: 83 % (ref 93–99)
SAO2 % BLDA: 87 % (ref 93–99)
SAO2 % BLDA: 90 % (ref 93–99)
SAO2 % BLDA: 93 % (ref 93–99)
SAO2 % BLDA: 97 % (ref 93–99)
SARS-COV-2 RNA NPH QL NAA+NON-PROBE: NOTDETECTED
SARS-COV-2 RNA RESP QL NAA+PROBE: NOTDETECTED
SARS-COV-2 RNA RESP QL NAA+PROBE: NOTDETECTED
SCCMEC + MECA PNL NOSE NAA+PROBE: NEGATIVE
SIGNIFICANT IND 70042: NORMAL
SITE SITE: NORMAL
SODIUM SERPL-SCNC: 137 MMOL/L (ref 135–145)
SODIUM SERPL-SCNC: 139 MMOL/L (ref 135–145)
SODIUM SERPL-SCNC: 144 MMOL/L (ref 135–145)
SODIUM SERPL-SCNC: 145 MMOL/L (ref 135–145)
SODIUM SERPL-SCNC: 145 MMOL/L (ref 135–145)
SODIUM SERPL-SCNC: 146 MMOL/L (ref 135–145)
SODIUM SERPL-SCNC: 147 MMOL/L (ref 135–145)
SODIUM SERPL-SCNC: 148 MMOL/L (ref 135–145)
SODIUM SERPL-SCNC: 149 MMOL/L (ref 135–145)
SODIUM SERPL-SCNC: 149 MMOL/L (ref 135–145)
SODIUM SERPL-SCNC: 150 MMOL/L (ref 135–145)
SODIUM SERPL-SCNC: 152 MMOL/L (ref 135–145)
SOURCE SOURCE: NORMAL
SP GR UR STRIP.AUTO: 1.01
SP GR UR STRIP.AUTO: <=1.005
SPECIMEN DRAWN FROM PATIENT: ABNORMAL
SPECIMEN SOURCE: NORMAL
SPECIMEN SOURCE: NORMAL
TRIGL SERPL-MCNC: 137 MG/DL (ref 0–149)
TROPONIN T SERPL-MCNC: 114 NG/L (ref 6–19)
TROPONIN T SERPL-MCNC: 120 NG/L (ref 6–19)
TROPONIN T SERPL-MCNC: 125 NG/L (ref 6–19)
TROPONIN T SERPL-MCNC: 16 NG/L (ref 6–19)
TROPONIN T SERPL-MCNC: 216 NG/L (ref 6–19)
TROPONIN T SERPL-MCNC: 91 NG/L (ref 6–19)
WBC # BLD AUTO: 10.6 K/UL (ref 4.8–10.8)
WBC # BLD AUTO: 12.1 K/UL (ref 4.8–10.8)
WBC # BLD AUTO: 12.4 K/UL (ref 4.8–10.8)
WBC # BLD AUTO: 12.4 K/UL (ref 4.8–10.8)
WBC # BLD AUTO: 13.1 K/UL (ref 4.8–10.8)
WBC # BLD AUTO: 13.1 K/UL (ref 4.8–10.8)
WBC # BLD AUTO: 13.3 K/UL (ref 4.8–10.8)
WBC # BLD AUTO: 15.9 K/UL (ref 4.8–10.8)
WBC # BLD AUTO: 17.6 K/UL (ref 4.8–10.8)
WBC # BLD AUTO: 17.9 K/UL (ref 4.8–10.8)
WBC # BLD AUTO: 18.9 K/UL (ref 4.8–10.8)
WBC # BLD AUTO: 19.4 K/UL (ref 4.8–10.8)
WBC # BLD AUTO: 21.2 K/UL (ref 4.8–10.8)
WBC # BLD AUTO: 23.7 K/UL (ref 4.8–10.8)
WBC # BLD AUTO: 24.9 K/UL (ref 4.8–10.8)
WBC #/AREA URNS HPF: ABNORMAL /HPF

## 2023-01-01 PROCEDURE — 83735 ASSAY OF MAGNESIUM: CPT

## 2023-01-01 PROCEDURE — 74181 MRI ABDOMEN W/O CONTRAST: CPT

## 2023-01-01 PROCEDURE — 80048 BASIC METABOLIC PNL TOTAL CA: CPT

## 2023-01-01 PROCEDURE — 700102 HCHG RX REV CODE 250 W/ 637 OVERRIDE(OP): Performed by: STUDENT IN AN ORGANIZED HEALTH CARE EDUCATION/TRAINING PROGRAM

## 2023-01-01 PROCEDURE — 36415 COLL VENOUS BLD VENIPUNCTURE: CPT

## 2023-01-01 PROCEDURE — 93010 ELECTROCARDIOGRAM REPORT: CPT | Performed by: INTERNAL MEDICINE

## 2023-01-01 PROCEDURE — 82803 BLOOD GASES ANY COMBINATION: CPT

## 2023-01-01 PROCEDURE — 80053 COMPREHEN METABOLIC PANEL: CPT

## 2023-01-01 PROCEDURE — 700111 HCHG RX REV CODE 636 W/ 250 OVERRIDE (IP): Mod: JZ | Performed by: STUDENT IN AN ORGANIZED HEALTH CARE EDUCATION/TRAINING PROGRAM

## 2023-01-01 PROCEDURE — 82962 GLUCOSE BLOOD TEST: CPT | Mod: 91

## 2023-01-01 PROCEDURE — 700105 HCHG RX REV CODE 258: Performed by: INTERNAL MEDICINE

## 2023-01-01 PROCEDURE — 94760 N-INVAS EAR/PLS OXIMETRY 1: CPT

## 2023-01-01 PROCEDURE — 700111 HCHG RX REV CODE 636 W/ 250 OVERRIDE (IP): Mod: JZ | Performed by: HOSPITALIST

## 2023-01-01 PROCEDURE — 82962 GLUCOSE BLOOD TEST: CPT

## 2023-01-01 PROCEDURE — 83615 LACTATE (LD) (LDH) ENZYME: CPT

## 2023-01-01 PROCEDURE — 82330 ASSAY OF CALCIUM: CPT

## 2023-01-01 PROCEDURE — 700111 HCHG RX REV CODE 636 W/ 250 OVERRIDE (IP): Mod: JZ | Performed by: INTERNAL MEDICINE

## 2023-01-01 PROCEDURE — 84484 ASSAY OF TROPONIN QUANT: CPT

## 2023-01-01 PROCEDURE — 94660 CPAP INITIATION&MGMT: CPT

## 2023-01-01 PROCEDURE — 770022 HCHG ROOM/CARE - ICU (200)

## 2023-01-01 PROCEDURE — 92526 ORAL FUNCTION THERAPY: CPT

## 2023-01-01 PROCEDURE — 84100 ASSAY OF PHOSPHORUS: CPT

## 2023-01-01 PROCEDURE — 83605 ASSAY OF LACTIC ACID: CPT

## 2023-01-01 PROCEDURE — A9270 NON-COVERED ITEM OR SERVICE: HCPCS | Performed by: STUDENT IN AN ORGANIZED HEALTH CARE EDUCATION/TRAINING PROGRAM

## 2023-01-01 PROCEDURE — A9270 NON-COVERED ITEM OR SERVICE: HCPCS | Performed by: INTERNAL MEDICINE

## 2023-01-01 PROCEDURE — 84145 PROCALCITONIN (PCT): CPT

## 2023-01-01 PROCEDURE — 700105 HCHG RX REV CODE 258: Performed by: STUDENT IN AN ORGANIZED HEALTH CARE EDUCATION/TRAINING PROGRAM

## 2023-01-01 PROCEDURE — 770020 HCHG ROOM/CARE - TELE (206)

## 2023-01-01 PROCEDURE — 700102 HCHG RX REV CODE 250 W/ 637 OVERRIDE(OP): Performed by: HOSPITALIST

## 2023-01-01 PROCEDURE — 99232 SBSQ HOSP IP/OBS MODERATE 35: CPT | Performed by: INTERNAL MEDICINE

## 2023-01-01 PROCEDURE — 85025 COMPLETE CBC W/AUTO DIFF WBC: CPT

## 2023-01-01 PROCEDURE — 84134 ASSAY OF PREALBUMIN: CPT

## 2023-01-01 PROCEDURE — 700102 HCHG RX REV CODE 250 W/ 637 OVERRIDE(OP): Performed by: INTERNAL MEDICINE

## 2023-01-01 PROCEDURE — 700105 HCHG RX REV CODE 258: Performed by: HOSPITALIST

## 2023-01-01 PROCEDURE — C9803 HOPD COVID-19 SPEC COLLECT: HCPCS | Performed by: INTERNAL MEDICINE

## 2023-01-01 PROCEDURE — C9803 HOPD COVID-19 SPEC COLLECT: HCPCS | Performed by: HOSPITALIST

## 2023-01-01 PROCEDURE — 85027 COMPLETE CBC AUTOMATED: CPT

## 2023-01-01 PROCEDURE — 96374 THER/PROPH/DIAG INJ IV PUSH: CPT

## 2023-01-01 PROCEDURE — 700111 HCHG RX REV CODE 636 W/ 250 OVERRIDE (IP): Performed by: STUDENT IN AN ORGANIZED HEALTH CARE EDUCATION/TRAINING PROGRAM

## 2023-01-01 PROCEDURE — 700111 HCHG RX REV CODE 636 W/ 250 OVERRIDE (IP): Performed by: HOSPITALIST

## 2023-01-01 PROCEDURE — 99291 CRITICAL CARE FIRST HOUR: CPT | Performed by: STUDENT IN AN ORGANIZED HEALTH CARE EDUCATION/TRAINING PROGRAM

## 2023-01-01 PROCEDURE — 74018 RADEX ABDOMEN 1 VIEW: CPT

## 2023-01-01 PROCEDURE — 94640 AIRWAY INHALATION TREATMENT: CPT

## 2023-01-01 PROCEDURE — A9270 NON-COVERED ITEM OR SERVICE: HCPCS | Performed by: HOSPITALIST

## 2023-01-01 PROCEDURE — 99291 CRITICAL CARE FIRST HOUR: CPT | Performed by: HOSPITALIST

## 2023-01-01 PROCEDURE — 700111 HCHG RX REV CODE 636 W/ 250 OVERRIDE (IP): Performed by: INTERNAL MEDICINE

## 2023-01-01 PROCEDURE — 0042T CT-CEREBRAL PERFUSION ANALYSIS: CPT

## 2023-01-01 PROCEDURE — 36600 WITHDRAWAL OF ARTERIAL BLOOD: CPT

## 2023-01-01 PROCEDURE — 770001 HCHG ROOM/CARE - MED/SURG/GYN PRIV*

## 2023-01-01 PROCEDURE — 96375 TX/PRO/DX INJ NEW DRUG ADDON: CPT

## 2023-01-01 PROCEDURE — 71260 CT THORAX DX C+: CPT

## 2023-01-01 PROCEDURE — 71045 X-RAY EXAM CHEST 1 VIEW: CPT

## 2023-01-01 PROCEDURE — A9579 GAD-BASE MR CONTRAST NOS,1ML: HCPCS | Performed by: HOSPITALIST

## 2023-01-01 PROCEDURE — 97167 OT EVAL HIGH COMPLEX 60 MIN: CPT

## 2023-01-01 PROCEDURE — 700117 HCHG RX CONTRAST REV CODE 255: Performed by: HOSPITALIST

## 2023-01-01 PROCEDURE — 92610 EVALUATE SWALLOWING FUNCTION: CPT

## 2023-01-01 PROCEDURE — 81003 URINALYSIS AUTO W/O SCOPE: CPT

## 2023-01-01 PROCEDURE — 700101 HCHG RX REV CODE 250

## 2023-01-01 PROCEDURE — 700117 HCHG RX CONTRAST REV CODE 255: Performed by: INTERNAL MEDICINE

## 2023-01-01 PROCEDURE — 81001 URINALYSIS AUTO W/SCOPE: CPT

## 2023-01-01 PROCEDURE — 87641 MR-STAPH DNA AMP PROBE: CPT

## 2023-01-01 PROCEDURE — 71275 CT ANGIOGRAPHY CHEST: CPT

## 2023-01-01 PROCEDURE — 70498 CT ANGIOGRAPHY NECK: CPT

## 2023-01-01 PROCEDURE — 700101 HCHG RX REV CODE 250: Performed by: HOSPITALIST

## 2023-01-01 PROCEDURE — 83690 ASSAY OF LIPASE: CPT

## 2023-01-01 PROCEDURE — 87798 DETECT AGENT NOS DNA AMP: CPT | Mod: 91

## 2023-01-01 PROCEDURE — 700117 HCHG RX CONTRAST REV CODE 255: Performed by: STUDENT IN AN ORGANIZED HEALTH CARE EDUCATION/TRAINING PROGRAM

## 2023-01-01 PROCEDURE — 93005 ELECTROCARDIOGRAM TRACING: CPT | Performed by: HOSPITALIST

## 2023-01-01 PROCEDURE — 99233 SBSQ HOSP IP/OBS HIGH 50: CPT | Performed by: INTERNAL MEDICINE

## 2023-01-01 PROCEDURE — 83605 ASSAY OF LACTIC ACID: CPT | Mod: 91

## 2023-01-01 PROCEDURE — 93005 ELECTROCARDIOGRAM TRACING: CPT | Performed by: STUDENT IN AN ORGANIZED HEALTH CARE EDUCATION/TRAINING PROGRAM

## 2023-01-01 PROCEDURE — 87633 RESP VIRUS 12-25 TARGETS: CPT

## 2023-01-01 PROCEDURE — 83880 ASSAY OF NATRIURETIC PEPTIDE: CPT

## 2023-01-01 PROCEDURE — 74177 CT ABD & PELVIS W/CONTRAST: CPT

## 2023-01-01 PROCEDURE — 700105 HCHG RX REV CODE 258: Mod: JZ | Performed by: HOSPITALIST

## 2023-01-01 PROCEDURE — 99291 CRITICAL CARE FIRST HOUR: CPT | Performed by: INTERNAL MEDICINE

## 2023-01-01 PROCEDURE — 76705 ECHO EXAM OF ABDOMEN: CPT

## 2023-01-01 PROCEDURE — 93306 TTE W/DOPPLER COMPLETE: CPT

## 2023-01-01 PROCEDURE — 700105 HCHG RX REV CODE 258: Mod: JZ | Performed by: STUDENT IN AN ORGANIZED HEALTH CARE EDUCATION/TRAINING PROGRAM

## 2023-01-01 PROCEDURE — 99285 EMERGENCY DEPT VISIT HI MDM: CPT

## 2023-01-01 PROCEDURE — 51798 US URINE CAPACITY MEASURE: CPT

## 2023-01-01 PROCEDURE — 700101 HCHG RX REV CODE 250: Performed by: INTERNAL MEDICINE

## 2023-01-01 PROCEDURE — 87040 BLOOD CULTURE FOR BACTERIA: CPT

## 2023-01-01 PROCEDURE — 0241U HCHG SARS-COV-2 COVID-19 NFCT DS RESP RNA 4 TRGT MIC: CPT

## 2023-01-01 PROCEDURE — 92612 ENDOSCOPY SWALLOW (FEES) VID: CPT

## 2023-01-01 PROCEDURE — 700111 HCHG RX REV CODE 636 W/ 250 OVERRIDE (IP): Mod: JZ

## 2023-01-01 PROCEDURE — 70496 CT ANGIOGRAPHY HEAD: CPT

## 2023-01-01 PROCEDURE — 82803 BLOOD GASES ANY COMBINATION: CPT | Mod: 91

## 2023-01-01 PROCEDURE — 700101 HCHG RX REV CODE 250: Performed by: STUDENT IN AN ORGANIZED HEALTH CARE EDUCATION/TRAINING PROGRAM

## 2023-01-01 PROCEDURE — 87486 CHLMYD PNEUM DNA AMP PROBE: CPT

## 2023-01-01 PROCEDURE — 93005 ELECTROCARDIOGRAM TRACING: CPT | Performed by: INTERNAL MEDICINE

## 2023-01-01 PROCEDURE — 99292 CRITICAL CARE ADDL 30 MIN: CPT | Performed by: INTERNAL MEDICINE

## 2023-01-01 PROCEDURE — 93306 TTE W/DOPPLER COMPLETE: CPT | Mod: 26 | Performed by: INTERNAL MEDICINE

## 2023-01-01 PROCEDURE — 70450 CT HEAD/BRAIN W/O DYE: CPT

## 2023-01-01 PROCEDURE — 97163 PT EVAL HIGH COMPLEX 45 MIN: CPT

## 2023-01-01 PROCEDURE — 70553 MRI BRAIN STEM W/O & W/DYE: CPT

## 2023-01-01 PROCEDURE — 99223 1ST HOSP IP/OBS HIGH 75: CPT | Mod: AI | Performed by: HOSPITALIST

## 2023-01-01 PROCEDURE — 85379 FIBRIN DEGRADATION QUANT: CPT

## 2023-01-01 PROCEDURE — 84100 ASSAY OF PHOSPHORUS: CPT | Mod: 91

## 2023-01-01 PROCEDURE — 86140 C-REACTIVE PROTEIN: CPT

## 2023-01-01 PROCEDURE — 87581 M.PNEUMON DNA AMP PROBE: CPT

## 2023-01-01 PROCEDURE — 84478 ASSAY OF TRIGLYCERIDES: CPT

## 2023-01-01 RX ORDER — BISACODYL 10 MG
10 SUPPOSITORY, RECTAL RECTAL
Status: DISCONTINUED | OUTPATIENT
Start: 2023-01-01 | End: 2023-01-01

## 2023-01-01 RX ORDER — METRONIDAZOLE 500 MG/100ML
500 INJECTION, SOLUTION INTRAVENOUS EVERY 12 HOURS
Status: DISCONTINUED | OUTPATIENT
Start: 2023-01-01 | End: 2023-01-01

## 2023-01-01 RX ORDER — ATROPINE SULFATE 10 MG/ML
2 SOLUTION/ DROPS OPHTHALMIC EVERY 4 HOURS PRN
Status: DISCONTINUED | OUTPATIENT
Start: 2023-01-01 | End: 2023-01-01 | Stop reason: HOSPADM

## 2023-01-01 RX ORDER — CLONIDINE HYDROCHLORIDE 0.1 MG/1
0.2 TABLET ORAL TWICE DAILY
Status: DISCONTINUED | OUTPATIENT
Start: 2023-01-01 | End: 2023-01-01

## 2023-01-01 RX ORDER — ACETAMINOPHEN 325 MG/1
650 TABLET ORAL EVERY 4 HOURS PRN
Status: DISCONTINUED | OUTPATIENT
Start: 2023-01-01 | End: 2023-01-01

## 2023-01-01 RX ORDER — ATORVASTATIN CALCIUM 40 MG/1
40 TABLET, FILM COATED ORAL NIGHTLY
Status: DISCONTINUED | OUTPATIENT
Start: 2023-01-01 | End: 2023-01-01

## 2023-01-01 RX ORDER — ONDANSETRON 2 MG/ML
4 INJECTION INTRAMUSCULAR; INTRAVENOUS EVERY 4 HOURS PRN
Status: DISCONTINUED | OUTPATIENT
Start: 2023-01-01 | End: 2023-01-01

## 2023-01-01 RX ORDER — CALCIUM GLUCONATE 20 MG/ML
1 INJECTION, SOLUTION INTRAVENOUS ONCE
Status: COMPLETED | OUTPATIENT
Start: 2023-01-01 | End: 2023-01-01

## 2023-01-01 RX ORDER — LINEZOLID 2 MG/ML
600 INJECTION, SOLUTION INTRAVENOUS EVERY 12 HOURS
Status: COMPLETED | OUTPATIENT
Start: 2023-01-01 | End: 2023-01-01

## 2023-01-01 RX ORDER — HYDROMORPHONE HYDROCHLORIDE 2 MG/ML
2 INJECTION, SOLUTION INTRAMUSCULAR; INTRAVENOUS; SUBCUTANEOUS
Status: DISCONTINUED | OUTPATIENT
Start: 2023-01-01 | End: 2023-01-01 | Stop reason: HOSPADM

## 2023-01-01 RX ORDER — LISINOPRIL 20 MG/1
20 TABLET ORAL
Status: DISCONTINUED | OUTPATIENT
Start: 2023-01-01 | End: 2023-01-01

## 2023-01-01 RX ORDER — CLOPIDOGREL BISULFATE 75 MG/1
75 TABLET ORAL DAILY
COMMUNITY

## 2023-01-01 RX ORDER — DEXMEDETOMIDINE HYDROCHLORIDE 4 UG/ML
.1-1.5 INJECTION, SOLUTION INTRAVENOUS CONTINUOUS
Status: DISCONTINUED | OUTPATIENT
Start: 2023-01-01 | End: 2023-01-01

## 2023-01-01 RX ORDER — FUROSEMIDE 10 MG/ML
40 INJECTION INTRAMUSCULAR; INTRAVENOUS ONCE
Status: COMPLETED | OUTPATIENT
Start: 2023-01-01 | End: 2023-01-01

## 2023-01-01 RX ORDER — OXYCODONE HYDROCHLORIDE 10 MG/1
10 TABLET ORAL EVERY 4 HOURS PRN
Status: DISCONTINUED | OUTPATIENT
Start: 2023-01-01 | End: 2023-01-01

## 2023-01-01 RX ORDER — METOPROLOL TARTRATE 50 MG/1
100 TABLET, FILM COATED ORAL TWICE DAILY
Status: DISCONTINUED | OUTPATIENT
Start: 2023-01-01 | End: 2023-01-01

## 2023-01-01 RX ORDER — VITAMIN E 268 MG
400 CAPSULE ORAL DAILY
COMMUNITY

## 2023-01-01 RX ORDER — GABAPENTIN 300 MG/1
300 CAPSULE ORAL NIGHTLY
COMMUNITY

## 2023-01-01 RX ORDER — CLONIDINE HYDROCHLORIDE 0.1 MG/1
0.1 TABLET ORAL TWICE DAILY
Status: DISCONTINUED | OUTPATIENT
Start: 2023-01-01 | End: 2023-01-01

## 2023-01-01 RX ORDER — SODIUM CHLORIDE, SODIUM LACTATE, POTASSIUM CHLORIDE, AND CALCIUM CHLORIDE .6; .31; .03; .02 G/100ML; G/100ML; G/100ML; G/100ML
500 INJECTION, SOLUTION INTRAVENOUS
Status: DISCONTINUED | OUTPATIENT
Start: 2023-01-01 | End: 2023-01-01

## 2023-01-01 RX ORDER — GAUZE BANDAGE 2" X 2"
100 BANDAGE TOPICAL DAILY
Status: DISCONTINUED | OUTPATIENT
Start: 2023-01-01 | End: 2023-01-01

## 2023-01-01 RX ORDER — SODIUM CHLORIDE 450 MG/100ML
INJECTION, SOLUTION INTRAVENOUS CONTINUOUS
Status: DISCONTINUED | OUTPATIENT
Start: 2023-01-01 | End: 2023-01-01

## 2023-01-01 RX ORDER — ONDANSETRON 2 MG/ML
4 INJECTION INTRAMUSCULAR; INTRAVENOUS ONCE
Status: COMPLETED | OUTPATIENT
Start: 2023-01-01 | End: 2023-01-01

## 2023-01-01 RX ORDER — GABAPENTIN 300 MG/1
300 CAPSULE ORAL NIGHTLY
Status: DISCONTINUED | OUTPATIENT
Start: 2023-01-01 | End: 2023-01-01

## 2023-01-01 RX ORDER — METOPROLOL TARTRATE 1 MG/ML
5 INJECTION, SOLUTION INTRAVENOUS ONCE
Status: COMPLETED | OUTPATIENT
Start: 2023-01-01 | End: 2023-01-01

## 2023-01-01 RX ORDER — FAMOTIDINE 20 MG/1
20 TABLET, FILM COATED ORAL 2 TIMES DAILY
Status: DISCONTINUED | OUTPATIENT
Start: 2023-01-01 | End: 2023-01-01

## 2023-01-01 RX ORDER — OXYCODONE HYDROCHLORIDE 10 MG/1
10 TABLET ORAL
Status: DISCONTINUED | OUTPATIENT
Start: 2023-01-01 | End: 2023-01-01

## 2023-01-01 RX ORDER — FLUOXETINE HYDROCHLORIDE 20 MG/1
20 CAPSULE ORAL DAILY
Status: DISCONTINUED | OUTPATIENT
Start: 2023-01-01 | End: 2023-01-01

## 2023-01-01 RX ORDER — LORAZEPAM 2 MG/ML
0.5 INJECTION INTRAMUSCULAR ONCE
Status: DISCONTINUED | OUTPATIENT
Start: 2023-01-01 | End: 2023-01-01

## 2023-01-01 RX ORDER — HYDROXYZINE HYDROCHLORIDE 25 MG/1
25 TABLET, FILM COATED ORAL 3 TIMES DAILY PRN
Status: DISCONTINUED | OUTPATIENT
Start: 2023-01-01 | End: 2023-01-01

## 2023-01-01 RX ORDER — DEXTROSE MONOHYDRATE 25 G/50ML
25 INJECTION, SOLUTION INTRAVENOUS
Status: DISCONTINUED | OUTPATIENT
Start: 2023-01-01 | End: 2023-01-01

## 2023-01-01 RX ORDER — LISINOPRIL 5 MG/1
5 TABLET ORAL
Status: DISCONTINUED | OUTPATIENT
Start: 2023-01-01 | End: 2023-01-01

## 2023-01-01 RX ORDER — CARBOXYMETHYLCELLULOSE SODIUM 5 MG/ML
1 SOLUTION/ DROPS OPHTHALMIC PRN
Status: DISCONTINUED | OUTPATIENT
Start: 2023-01-01 | End: 2023-01-01 | Stop reason: HOSPADM

## 2023-01-01 RX ORDER — AMLODIPINE BESYLATE 5 MG/1
10 TABLET ORAL
Status: DISCONTINUED | OUTPATIENT
Start: 2023-01-01 | End: 2023-01-01

## 2023-01-01 RX ORDER — FUROSEMIDE 10 MG/ML
INJECTION INTRAMUSCULAR; INTRAVENOUS
Status: COMPLETED
Start: 2023-01-01 | End: 2023-01-01

## 2023-01-01 RX ORDER — LORAZEPAM 2 MG/ML
0.5 INJECTION INTRAMUSCULAR ONCE
Status: COMPLETED | OUTPATIENT
Start: 2023-01-01 | End: 2023-01-01

## 2023-01-01 RX ORDER — IPRATROPIUM BROMIDE AND ALBUTEROL SULFATE 2.5; .5 MG/3ML; MG/3ML
3 SOLUTION RESPIRATORY (INHALATION)
Status: DISCONTINUED | OUTPATIENT
Start: 2023-01-01 | End: 2023-01-01

## 2023-01-01 RX ORDER — HALOPERIDOL 5 MG/ML
INJECTION INTRAMUSCULAR
Status: COMPLETED
Start: 2023-01-01 | End: 2023-01-01

## 2023-01-01 RX ORDER — DEXTROSE MONOHYDRATE 50 MG/ML
INJECTION, SOLUTION INTRAVENOUS CONTINUOUS
Status: DISCONTINUED | OUTPATIENT
Start: 2023-01-01 | End: 2023-01-01

## 2023-01-01 RX ORDER — LORAZEPAM 2 MG/ML
0.5 INJECTION INTRAMUSCULAR EVERY 8 HOURS PRN
Status: DISPENSED | OUTPATIENT
Start: 2023-01-01 | End: 2023-01-01

## 2023-01-01 RX ORDER — OXYCODONE HYDROCHLORIDE 5 MG/1
5 TABLET ORAL EVERY 4 HOURS PRN
Status: DISCONTINUED | OUTPATIENT
Start: 2023-01-01 | End: 2023-01-01

## 2023-01-01 RX ORDER — LORAZEPAM 2 MG/ML
2 INJECTION INTRAMUSCULAR EVERY 4 HOURS PRN
Status: DISCONTINUED | OUTPATIENT
Start: 2023-01-01 | End: 2023-01-01

## 2023-01-01 RX ORDER — GABAPENTIN 100 MG/1
200 CAPSULE ORAL 2 TIMES DAILY
Status: DISCONTINUED | OUTPATIENT
Start: 2023-01-01 | End: 2023-01-01

## 2023-01-01 RX ORDER — LISINOPRIL 20 MG/1
20 TABLET ORAL ONCE
Status: COMPLETED | OUTPATIENT
Start: 2023-01-01 | End: 2023-01-01

## 2023-01-01 RX ORDER — LISINOPRIL 5 MG/1
5 TABLET ORAL DAILY
COMMUNITY

## 2023-01-01 RX ORDER — HYDRALAZINE HYDROCHLORIDE 20 MG/ML
10 INJECTION INTRAMUSCULAR; INTRAVENOUS EVERY 6 HOURS PRN
Status: DISCONTINUED | OUTPATIENT
Start: 2023-01-01 | End: 2023-01-01

## 2023-01-01 RX ORDER — OXYCODONE HCL 5 MG/5 ML
10 SOLUTION, ORAL ORAL
Status: CANCELLED | OUTPATIENT
Start: 2023-01-01

## 2023-01-01 RX ORDER — SODIUM CHLORIDE 9 MG/ML
2000 INJECTION, SOLUTION INTRAVENOUS ONCE
Status: COMPLETED | OUTPATIENT
Start: 2023-01-01 | End: 2023-01-01

## 2023-01-01 RX ORDER — METOPROLOL TARTRATE 50 MG/1
50 TABLET, FILM COATED ORAL TWICE DAILY
Status: DISCONTINUED | OUTPATIENT
Start: 2023-01-01 | End: 2023-01-01

## 2023-01-01 RX ORDER — HYDROMORPHONE HYDROCHLORIDE 1 MG/ML
0.2 INJECTION, SOLUTION INTRAMUSCULAR; INTRAVENOUS; SUBCUTANEOUS
Status: CANCELLED | OUTPATIENT
Start: 2023-01-01

## 2023-01-01 RX ORDER — DEXMEDETOMIDINE HYDROCHLORIDE 4 UG/ML
INJECTION, SOLUTION INTRAVENOUS
Status: ACTIVE
Start: 2023-01-01 | End: 2023-01-01

## 2023-01-01 RX ORDER — GABAPENTIN 100 MG/1
200 CAPSULE ORAL 2 TIMES DAILY
COMMUNITY

## 2023-01-01 RX ORDER — ACETAMINOPHEN 500 MG
1000 TABLET ORAL ONCE
Status: CANCELLED | OUTPATIENT
Start: 2023-01-01 | End: 2023-01-01

## 2023-01-01 RX ORDER — MAGNESIUM SULFATE HEPTAHYDRATE 40 MG/ML
2 INJECTION, SOLUTION INTRAVENOUS ONCE
Status: COMPLETED | OUTPATIENT
Start: 2023-01-01 | End: 2023-01-01

## 2023-01-01 RX ORDER — ASPIRIN 325 MG
325 TABLET ORAL DAILY
COMMUNITY

## 2023-01-01 RX ORDER — LISINOPRIL 20 MG/1
40 TABLET ORAL
Status: DISCONTINUED | OUTPATIENT
Start: 2023-01-01 | End: 2023-01-01

## 2023-01-01 RX ORDER — SODIUM CHLORIDE, SODIUM LACTATE, POTASSIUM CHLORIDE, CALCIUM CHLORIDE 600; 310; 30; 20 MG/100ML; MG/100ML; MG/100ML; MG/100ML
INJECTION, SOLUTION INTRAVENOUS CONTINUOUS
Status: DISCONTINUED | OUTPATIENT
Start: 2023-01-01 | End: 2023-01-01

## 2023-01-01 RX ORDER — DIPHENHYDRAMINE HYDROCHLORIDE 50 MG/ML
12.5 INJECTION INTRAMUSCULAR; INTRAVENOUS
Status: CANCELLED | OUTPATIENT
Start: 2023-01-01

## 2023-01-01 RX ORDER — ATORVASTATIN CALCIUM 20 MG/1
40 TABLET, FILM COATED ORAL NIGHTLY
COMMUNITY

## 2023-01-01 RX ORDER — LISINOPRIL 5 MG/1
5 TABLET ORAL DAILY
Status: DISCONTINUED | OUTPATIENT
Start: 2023-01-01 | End: 2023-01-01

## 2023-01-01 RX ORDER — AMOXICILLIN 250 MG
2 CAPSULE ORAL 2 TIMES DAILY
Status: DISCONTINUED | OUTPATIENT
Start: 2023-01-01 | End: 2023-01-01

## 2023-01-01 RX ORDER — BUSPIRONE HYDROCHLORIDE 5 MG/1
10 TABLET ORAL 3 TIMES DAILY
Status: DISCONTINUED | OUTPATIENT
Start: 2023-01-01 | End: 2023-01-01

## 2023-01-01 RX ORDER — SODIUM CHLORIDE, SODIUM LACTATE, POTASSIUM CHLORIDE, AND CALCIUM CHLORIDE .6; .31; .03; .02 G/100ML; G/100ML; G/100ML; G/100ML
500 INJECTION, SOLUTION INTRAVENOUS ONCE
Status: COMPLETED | OUTPATIENT
Start: 2023-01-01 | End: 2023-01-01

## 2023-01-01 RX ORDER — HYDROMORPHONE HYDROCHLORIDE 1 MG/ML
1 INJECTION, SOLUTION INTRAMUSCULAR; INTRAVENOUS; SUBCUTANEOUS
Status: DISCONTINUED | OUTPATIENT
Start: 2023-01-01 | End: 2023-01-01

## 2023-01-01 RX ORDER — FUROSEMIDE 10 MG/ML
20 INJECTION INTRAMUSCULAR; INTRAVENOUS
Status: DISCONTINUED | OUTPATIENT
Start: 2023-01-01 | End: 2023-01-01

## 2023-01-01 RX ORDER — BACLOFEN 10 MG/1
10 TABLET ORAL 3 TIMES DAILY
COMMUNITY

## 2023-01-01 RX ORDER — POLYETHYLENE GLYCOL 3350 17 G/17G
1 POWDER, FOR SOLUTION ORAL
Status: DISCONTINUED | OUTPATIENT
Start: 2023-01-01 | End: 2023-01-01

## 2023-01-01 RX ORDER — HYDROMORPHONE HYDROCHLORIDE 2 MG/ML
4 INJECTION, SOLUTION INTRAMUSCULAR; INTRAVENOUS; SUBCUTANEOUS
Status: DISCONTINUED | OUTPATIENT
Start: 2023-01-01 | End: 2023-01-01 | Stop reason: HOSPADM

## 2023-01-01 RX ORDER — MORPHINE SULFATE 15 MG/1
15 TABLET, FILM COATED, EXTENDED RELEASE ORAL EVERY 12 HOURS
Status: DISCONTINUED | OUTPATIENT
Start: 2023-01-01 | End: 2023-01-01

## 2023-01-01 RX ORDER — ACETAMINOPHEN 325 MG/1
650 TABLET ORAL EVERY 4 HOURS PRN
Status: DISCONTINUED | OUTPATIENT
Start: 2023-01-01 | End: 2023-01-01 | Stop reason: HOSPADM

## 2023-01-01 RX ORDER — BACLOFEN 10 MG/1
10 TABLET ORAL 3 TIMES DAILY PRN
Status: DISCONTINUED | OUTPATIENT
Start: 2023-01-01 | End: 2023-01-01

## 2023-01-01 RX ORDER — SODIUM CHLORIDE, SODIUM LACTATE, POTASSIUM CHLORIDE, CALCIUM CHLORIDE 600; 310; 30; 20 MG/100ML; MG/100ML; MG/100ML; MG/100ML
INJECTION, SOLUTION INTRAVENOUS CONTINUOUS
Status: CANCELLED | OUTPATIENT
Start: 2023-01-01

## 2023-01-01 RX ORDER — METRONIDAZOLE 500 MG/100ML
500 INJECTION, SOLUTION INTRAVENOUS ONCE
Status: DISCONTINUED | OUTPATIENT
Start: 2023-01-01 | End: 2023-01-01

## 2023-01-01 RX ORDER — SODIUM CHLORIDE 9 MG/ML
INJECTION, SOLUTION INTRAVENOUS CONTINUOUS
Status: DISCONTINUED | OUTPATIENT
Start: 2023-01-01 | End: 2023-01-01

## 2023-01-01 RX ORDER — HYDROMORPHONE HYDROCHLORIDE 1 MG/ML
0.4 INJECTION, SOLUTION INTRAMUSCULAR; INTRAVENOUS; SUBCUTANEOUS
Status: CANCELLED | OUTPATIENT
Start: 2023-01-01

## 2023-01-01 RX ORDER — HYDROMORPHONE HYDROCHLORIDE 1 MG/ML
0.5 INJECTION, SOLUTION INTRAMUSCULAR; INTRAVENOUS; SUBCUTANEOUS
Status: DISCONTINUED | OUTPATIENT
Start: 2023-01-01 | End: 2023-01-01

## 2023-01-01 RX ORDER — ONDANSETRON 2 MG/ML
4 INJECTION INTRAMUSCULAR; INTRAVENOUS
Status: CANCELLED | OUTPATIENT
Start: 2023-01-01

## 2023-01-01 RX ORDER — LABETALOL HYDROCHLORIDE 5 MG/ML
10 INJECTION, SOLUTION INTRAVENOUS EVERY 4 HOURS PRN
Status: DISCONTINUED | OUTPATIENT
Start: 2023-01-01 | End: 2023-01-01

## 2023-01-01 RX ORDER — ASCORBIC ACID 500 MG
1000 TABLET ORAL
COMMUNITY

## 2023-01-01 RX ORDER — DOCUSATE SODIUM 100 MG/1
100 CAPSULE, LIQUID FILLED ORAL DAILY
COMMUNITY

## 2023-01-01 RX ORDER — LORAZEPAM 2 MG/ML
1 CONCENTRATE ORAL
Status: DISCONTINUED | OUTPATIENT
Start: 2023-01-01 | End: 2023-01-01 | Stop reason: HOSPADM

## 2023-01-01 RX ORDER — HALOPERIDOL 5 MG/ML
2 INJECTION INTRAMUSCULAR
Status: DISCONTINUED | OUTPATIENT
Start: 2023-01-01 | End: 2023-01-01

## 2023-01-01 RX ORDER — HYDROMORPHONE HYDROCHLORIDE 1 MG/ML
0.1 INJECTION, SOLUTION INTRAMUSCULAR; INTRAVENOUS; SUBCUTANEOUS
Status: CANCELLED | OUTPATIENT
Start: 2023-01-01

## 2023-01-01 RX ORDER — LORAZEPAM 2 MG/ML
1 INJECTION INTRAMUSCULAR
Status: DISCONTINUED | OUTPATIENT
Start: 2023-01-01 | End: 2023-01-01 | Stop reason: HOSPADM

## 2023-01-01 RX ORDER — OMEPRAZOLE 20 MG/1
40 CAPSULE, DELAYED RELEASE ORAL 2 TIMES DAILY
Status: DISCONTINUED | OUTPATIENT
Start: 2023-01-01 | End: 2023-01-01

## 2023-01-01 RX ORDER — ACETAMINOPHEN 650 MG/1
650 SUPPOSITORY RECTAL EVERY 4 HOURS PRN
Status: DISCONTINUED | OUTPATIENT
Start: 2023-01-01 | End: 2023-01-01 | Stop reason: HOSPADM

## 2023-01-01 RX ORDER — HEPARIN SODIUM 5000 [USP'U]/ML
5000 INJECTION, SOLUTION INTRAVENOUS; SUBCUTANEOUS EVERY 8 HOURS
Status: DISCONTINUED | OUTPATIENT
Start: 2023-01-01 | End: 2023-01-01

## 2023-01-01 RX ORDER — POTASSIUM CHLORIDE 7.45 MG/ML
10 INJECTION INTRAVENOUS
Status: COMPLETED | OUTPATIENT
Start: 2023-01-01 | End: 2023-01-01

## 2023-01-01 RX ORDER — PANTOPRAZOLE SODIUM 40 MG/1
40 TABLET, DELAYED RELEASE ORAL 2 TIMES DAILY
COMMUNITY

## 2023-01-01 RX ORDER — OXYCODONE HCL 5 MG/5 ML
5 SOLUTION, ORAL ORAL
Status: CANCELLED | OUTPATIENT
Start: 2023-01-01

## 2023-01-01 RX ORDER — PROCHLORPERAZINE EDISYLATE 5 MG/ML
10 INJECTION INTRAMUSCULAR; INTRAVENOUS EVERY 6 HOURS PRN
Status: DISCONTINUED | OUTPATIENT
Start: 2023-01-01 | End: 2023-01-01

## 2023-01-01 RX ORDER — CLONIDINE HYDROCHLORIDE 0.1 MG/1
0.1 TABLET ORAL ONCE
Status: COMPLETED | OUTPATIENT
Start: 2023-01-01 | End: 2023-01-01

## 2023-01-01 RX ORDER — HYDRALAZINE HYDROCHLORIDE 20 MG/ML
10 INJECTION INTRAMUSCULAR; INTRAVENOUS ONCE
Status: COMPLETED | OUTPATIENT
Start: 2023-01-01 | End: 2023-01-01

## 2023-01-01 RX ORDER — SODIUM CHLORIDE, SODIUM LACTATE, POTASSIUM CHLORIDE, CALCIUM CHLORIDE 600; 310; 30; 20 MG/100ML; MG/100ML; MG/100ML; MG/100ML
1000 INJECTION, SOLUTION INTRAVENOUS ONCE
Status: COMPLETED | OUTPATIENT
Start: 2023-01-01 | End: 2023-01-01

## 2023-01-01 RX ORDER — HALOPERIDOL 5 MG/ML
1 INJECTION INTRAMUSCULAR
Status: CANCELLED | OUTPATIENT
Start: 2023-01-01

## 2023-01-01 RX ORDER — LORAZEPAM 2 MG/ML
0.5 INJECTION INTRAMUSCULAR EVERY 4 HOURS PRN
Status: DISCONTINUED | OUTPATIENT
Start: 2023-01-01 | End: 2023-01-01

## 2023-01-01 RX ORDER — FLUOXETINE HYDROCHLORIDE 20 MG/1
20 CAPSULE ORAL DAILY
COMMUNITY

## 2023-01-01 RX ORDER — OXYCODONE HYDROCHLORIDE 5 MG/1
5 TABLET ORAL
Status: DISCONTINUED | OUTPATIENT
Start: 2023-01-01 | End: 2023-01-01

## 2023-01-01 RX ADMIN — GABAPENTIN 200 MG: 100 CAPSULE ORAL at 21:23

## 2023-01-01 RX ADMIN — HEPARIN SODIUM 5000 UNITS: 5000 INJECTION, SOLUTION INTRAVENOUS; SUBCUTANEOUS at 14:53

## 2023-01-01 RX ADMIN — METRONIDAZOLE 500 MG: 5 INJECTION, SOLUTION INTRAVENOUS at 06:43

## 2023-01-01 RX ADMIN — OXYCODONE HYDROCHLORIDE 10 MG: 10 TABLET ORAL at 12:15

## 2023-01-01 RX ADMIN — POTASSIUM BICARBONATE 25 MEQ: 978 TABLET, EFFERVESCENT ORAL at 07:42

## 2023-01-01 RX ADMIN — GABAPENTIN 200 MG: 100 CAPSULE ORAL at 20:50

## 2023-01-01 RX ADMIN — METOPROLOL TARTRATE 25 MG: 25 TABLET, FILM COATED ORAL at 18:01

## 2023-01-01 RX ADMIN — LABETALOL HYDROCHLORIDE 10 MG: 5 INJECTION INTRAVENOUS at 13:01

## 2023-01-01 RX ADMIN — LABETALOL HYDROCHLORIDE 10 MG: 5 INJECTION INTRAVENOUS at 03:24

## 2023-01-01 RX ADMIN — FAMOTIDINE 20 MG: 20 TABLET ORAL at 17:00

## 2023-01-01 RX ADMIN — HEPARIN SODIUM 5000 UNITS: 5000 INJECTION, SOLUTION INTRAVENOUS; SUBCUTANEOUS at 21:09

## 2023-01-01 RX ADMIN — DEXMEDETOMIDINE HYDROCHLORIDE 1.3 MCG/KG/HR: 400 INJECTION INTRAVENOUS at 21:45

## 2023-01-01 RX ADMIN — OXYCODONE HYDROCHLORIDE 10 MG: 10 TABLET ORAL at 00:13

## 2023-01-01 RX ADMIN — POTASSIUM CHLORIDE 10 MEQ: 7.46 INJECTION, SOLUTION INTRAVENOUS at 12:37

## 2023-01-01 RX ADMIN — CEFTRIAXONE SODIUM 2000 MG: 2 INJECTION, POWDER, FOR SOLUTION INTRAMUSCULAR; INTRAVENOUS at 17:35

## 2023-01-01 RX ADMIN — LABETALOL HYDROCHLORIDE 10 MG: 5 INJECTION INTRAVENOUS at 13:56

## 2023-01-01 RX ADMIN — GABAPENTIN 200 MG: 100 CAPSULE ORAL at 22:37

## 2023-01-01 RX ADMIN — GADOTERIDOL 20 ML: 279.3 INJECTION, SOLUTION INTRAVENOUS at 16:52

## 2023-01-01 RX ADMIN — FUROSEMIDE 40 MG: 10 INJECTION, SOLUTION INTRAMUSCULAR; INTRAVENOUS at 23:06

## 2023-01-01 RX ADMIN — LISINOPRIL 20 MG: 20 TABLET ORAL at 08:24

## 2023-01-01 RX ADMIN — GABAPENTIN 200 MG: 100 CAPSULE ORAL at 05:24

## 2023-01-01 RX ADMIN — HEPARIN SODIUM 5000 UNITS: 5000 INJECTION, SOLUTION INTRAVENOUS; SUBCUTANEOUS at 05:27

## 2023-01-01 RX ADMIN — HYDROMORPHONE HYDROCHLORIDE 1 MG: 1 INJECTION, SOLUTION INTRAMUSCULAR; INTRAVENOUS; SUBCUTANEOUS at 15:43

## 2023-01-01 RX ADMIN — LINEZOLID 600 MG: 600 INJECTION, SOLUTION INTRAVENOUS at 17:45

## 2023-01-01 RX ADMIN — HEPARIN SODIUM 5000 UNITS: 5000 INJECTION, SOLUTION INTRAVENOUS; SUBCUTANEOUS at 06:13

## 2023-01-01 RX ADMIN — PIPERACILLIN AND TAZOBACTAM 3.38 G: 3; .375 INJECTION, POWDER, FOR SOLUTION INTRAVENOUS at 21:40

## 2023-01-01 RX ADMIN — FAMOTIDINE 20 MG: 20 TABLET, FILM COATED ORAL at 18:13

## 2023-01-01 RX ADMIN — AMLODIPINE BESYLATE 10 MG: 5 TABLET ORAL at 05:05

## 2023-01-01 RX ADMIN — FENTANYL CITRATE 50 MCG: 50 INJECTION, SOLUTION INTRAMUSCULAR; INTRAVENOUS at 19:34

## 2023-01-01 RX ADMIN — LABETALOL HYDROCHLORIDE 10 MG: 5 INJECTION INTRAVENOUS at 06:42

## 2023-01-01 RX ADMIN — ALBUTEROL SULFATE 2.5 MG: 2.5 SOLUTION RESPIRATORY (INHALATION) at 22:57

## 2023-01-01 RX ADMIN — HEPARIN SODIUM 5000 UNITS: 5000 INJECTION, SOLUTION INTRAVENOUS; SUBCUTANEOUS at 14:03

## 2023-01-01 RX ADMIN — FAMOTIDINE 20 MG: 20 TABLET ORAL at 06:13

## 2023-01-01 RX ADMIN — HEPARIN SODIUM 5000 UNITS: 5000 INJECTION, SOLUTION INTRAVENOUS; SUBCUTANEOUS at 20:57

## 2023-01-01 RX ADMIN — LISINOPRIL 20 MG: 20 TABLET ORAL at 05:53

## 2023-01-01 RX ADMIN — METOPROLOL TARTRATE 100 MG: 50 TABLET, FILM COATED ORAL at 05:18

## 2023-01-01 RX ADMIN — FAMOTIDINE 20 MG: 10 INJECTION, SOLUTION INTRAVENOUS at 08:53

## 2023-01-01 RX ADMIN — METOPROLOL TARTRATE 100 MG: 50 TABLET, FILM COATED ORAL at 18:14

## 2023-01-01 RX ADMIN — INSULIN HUMAN 1 UNITS: 100 INJECTION, SOLUTION PARENTERAL at 06:05

## 2023-01-01 RX ADMIN — PIPERACILLIN AND TAZOBACTAM 3.38 G: 3; .375 INJECTION, POWDER, FOR SOLUTION INTRAVENOUS at 20:27

## 2023-01-01 RX ADMIN — PIPERACILLIN AND TAZOBACTAM 3.38 G: 3; .375 INJECTION, POWDER, FOR SOLUTION INTRAVENOUS at 20:44

## 2023-01-01 RX ADMIN — OMEPRAZOLE 40 MG: 20 CAPSULE, DELAYED RELEASE ORAL at 17:35

## 2023-01-01 RX ADMIN — INSULIN HUMAN 1 UNITS: 100 INJECTION, SOLUTION PARENTERAL at 21:53

## 2023-01-01 RX ADMIN — SODIUM CHLORIDE 2000 ML: 9 INJECTION, SOLUTION INTRAVENOUS at 04:11

## 2023-01-01 RX ADMIN — PIPERACILLIN AND TAZOBACTAM 3.38 G: 3; .375 INJECTION, POWDER, FOR SOLUTION INTRAVENOUS at 21:18

## 2023-01-01 RX ADMIN — CLONIDINE HYDROCHLORIDE 0.2 MG: 0.1 TABLET ORAL at 05:18

## 2023-01-01 RX ADMIN — ACETAMINOPHEN 650 MG: 325 TABLET ORAL at 01:17

## 2023-01-01 RX ADMIN — INSULIN HUMAN 3 UNITS: 100 INJECTION, SOLUTION PARENTERAL at 20:47

## 2023-01-01 RX ADMIN — POTASSIUM PHOSPHATE, MONOBASIC AND POTASSIUM PHOSPHATE, DIBASIC 15 MMOL: 224; 236 INJECTION, SOLUTION, CONCENTRATE INTRAVENOUS at 07:41

## 2023-01-01 RX ADMIN — CALCIUM GLUCONATE 1 G: 20 INJECTION, SOLUTION INTRAVENOUS at 11:47

## 2023-01-01 RX ADMIN — CLONIDINE HYDROCHLORIDE 0.2 MG: 0.1 TABLET ORAL at 17:38

## 2023-01-01 RX ADMIN — POTASSIUM PHOSPHATE, MONOBASIC AND POTASSIUM PHOSPHATE, DIBASIC 30 MMOL: 224; 236 INJECTION, SOLUTION, CONCENTRATE INTRAVENOUS at 08:02

## 2023-01-01 RX ADMIN — HYDRALAZINE HYDROCHLORIDE 10 MG: 20 INJECTION, SOLUTION INTRAMUSCULAR; INTRAVENOUS at 13:18

## 2023-01-01 RX ADMIN — LORAZEPAM 0.5 MG: 2 INJECTION INTRAMUSCULAR; INTRAVENOUS at 21:26

## 2023-01-01 RX ADMIN — HYDROMORPHONE HYDROCHLORIDE 0.5 MG: 1 INJECTION, SOLUTION INTRAMUSCULAR; INTRAVENOUS; SUBCUTANEOUS at 06:15

## 2023-01-01 RX ADMIN — CLONIDINE HYDROCHLORIDE 0.2 MG: 0.1 TABLET ORAL at 06:21

## 2023-01-01 RX ADMIN — PIPERACILLIN AND TAZOBACTAM 3.38 G: 3; .375 INJECTION, POWDER, FOR SOLUTION INTRAVENOUS at 04:23

## 2023-01-01 RX ADMIN — HEPARIN SODIUM 5000 UNITS: 5000 INJECTION, SOLUTION INTRAVENOUS; SUBCUTANEOUS at 21:31

## 2023-01-01 RX ADMIN — METOPROLOL TARTRATE 50 MG: 50 TABLET, FILM COATED ORAL at 05:53

## 2023-01-01 RX ADMIN — PIPERACILLIN AND TAZOBACTAM 3.38 G: 3; .375 INJECTION, POWDER, FOR SOLUTION INTRAVENOUS at 14:06

## 2023-01-01 RX ADMIN — MAGNESIUM SULFATE HEPTAHYDRATE 2 G: 2 INJECTION, SOLUTION INTRAVENOUS at 10:27

## 2023-01-01 RX ADMIN — DEXMEDETOMIDINE HYDROCHLORIDE 0.7 MCG/KG/HR: 400 INJECTION INTRAVENOUS at 14:44

## 2023-01-01 RX ADMIN — LABETALOL HYDROCHLORIDE 10 MG: 5 INJECTION INTRAVENOUS at 23:24

## 2023-01-01 RX ADMIN — HEPARIN SODIUM 5000 UNITS: 5000 INJECTION, SOLUTION INTRAVENOUS; SUBCUTANEOUS at 06:18

## 2023-01-01 RX ADMIN — HEPARIN SODIUM 5000 UNITS: 5000 INJECTION, SOLUTION INTRAVENOUS; SUBCUTANEOUS at 22:22

## 2023-01-01 RX ADMIN — LABETALOL HYDROCHLORIDE 10 MG: 5 INJECTION INTRAVENOUS at 12:15

## 2023-01-01 RX ADMIN — FAMOTIDINE 20 MG: 10 INJECTION, SOLUTION INTRAVENOUS at 05:22

## 2023-01-01 RX ADMIN — ACETAMINOPHEN 650 MG: 325 TABLET ORAL at 23:27

## 2023-01-01 RX ADMIN — OXYCODONE 5 MG: 5 TABLET ORAL at 21:07

## 2023-01-01 RX ADMIN — ACETAMINOPHEN 650 MG: 325 TABLET ORAL at 12:17

## 2023-01-01 RX ADMIN — PIPERACILLIN AND TAZOBACTAM 3.38 G: 3; .375 INJECTION, POWDER, FOR SOLUTION INTRAVENOUS at 05:10

## 2023-01-01 RX ADMIN — LABETALOL HYDROCHLORIDE 10 MG: 5 INJECTION INTRAVENOUS at 06:31

## 2023-01-01 RX ADMIN — PIPERACILLIN AND TAZOBACTAM 3.38 G: 3; .375 INJECTION, POWDER, FOR SOLUTION INTRAVENOUS at 14:42

## 2023-01-01 RX ADMIN — CLONIDINE HYDROCHLORIDE 0.2 MG: 0.1 TABLET ORAL at 05:06

## 2023-01-01 RX ADMIN — LINEZOLID 600 MG: 600 INJECTION, SOLUTION INTRAVENOUS at 06:13

## 2023-01-01 RX ADMIN — HALOPERIDOL LACTATE 2 MG: 5 INJECTION, SOLUTION INTRAMUSCULAR at 08:01

## 2023-01-01 RX ADMIN — HYDROMORPHONE HYDROCHLORIDE 1 MG: 1 INJECTION, SOLUTION INTRAMUSCULAR; INTRAVENOUS; SUBCUTANEOUS at 16:39

## 2023-01-01 RX ADMIN — HYDRALAZINE HYDROCHLORIDE 10 MG: 20 INJECTION, SOLUTION INTRAMUSCULAR; INTRAVENOUS at 04:03

## 2023-01-01 RX ADMIN — PIPERACILLIN AND TAZOBACTAM 3.38 G: 3; .375 INJECTION, POWDER, FOR SOLUTION INTRAVENOUS at 12:29

## 2023-01-01 RX ADMIN — HYDROMORPHONE HYDROCHLORIDE 1 MG: 1 INJECTION, SOLUTION INTRAMUSCULAR; INTRAVENOUS; SUBCUTANEOUS at 19:39

## 2023-01-01 RX ADMIN — PIPERACILLIN AND TAZOBACTAM 3.38 G: 3; .375 INJECTION, POWDER, FOR SOLUTION INTRAVENOUS at 12:56

## 2023-01-01 RX ADMIN — HYDROMORPHONE HYDROCHLORIDE 1 MG: 1 INJECTION, SOLUTION INTRAMUSCULAR; INTRAVENOUS; SUBCUTANEOUS at 03:31

## 2023-01-01 RX ADMIN — PIPERACILLIN AND TAZOBACTAM 3.38 G: 3; .375 INJECTION, POWDER, FOR SOLUTION INTRAVENOUS at 21:00

## 2023-01-01 RX ADMIN — GABAPENTIN 300 MG: 300 CAPSULE ORAL at 21:07

## 2023-01-01 RX ADMIN — LORAZEPAM 0.5 MG: 2 INJECTION INTRAMUSCULAR; INTRAVENOUS at 04:49

## 2023-01-01 RX ADMIN — FAMOTIDINE 20 MG: 20 TABLET ORAL at 18:32

## 2023-01-01 RX ADMIN — DEXMEDETOMIDINE HYDROCHLORIDE 0.2 MCG/KG/HR: 400 INJECTION INTRAVENOUS at 06:17

## 2023-01-01 RX ADMIN — POTASSIUM CHLORIDE 10 MEQ: 7.46 INJECTION, SOLUTION INTRAVENOUS at 09:21

## 2023-01-01 RX ADMIN — DEXMEDETOMIDINE HYDROCHLORIDE 0.7 MCG/KG/HR: 400 INJECTION INTRAVENOUS at 01:04

## 2023-01-01 RX ADMIN — LISINOPRIL 20 MG: 20 TABLET ORAL at 05:52

## 2023-01-01 RX ADMIN — HEPARIN SODIUM 5000 UNITS: 5000 INJECTION, SOLUTION INTRAVENOUS; SUBCUTANEOUS at 05:41

## 2023-01-01 RX ADMIN — HYDROMORPHONE HYDROCHLORIDE 0.5 MG: 1 INJECTION, SOLUTION INTRAMUSCULAR; INTRAVENOUS; SUBCUTANEOUS at 22:27

## 2023-01-01 RX ADMIN — HYDROMORPHONE HYDROCHLORIDE 1 MG: 1 INJECTION, SOLUTION INTRAMUSCULAR; INTRAVENOUS; SUBCUTANEOUS at 10:41

## 2023-01-01 RX ADMIN — FAMOTIDINE 20 MG: 20 TABLET ORAL at 05:52

## 2023-01-01 RX ADMIN — HEPARIN SODIUM 5000 UNITS: 5000 INJECTION, SOLUTION INTRAVENOUS; SUBCUTANEOUS at 14:21

## 2023-01-01 RX ADMIN — FAMOTIDINE 20 MG: 20 TABLET ORAL at 05:19

## 2023-01-01 RX ADMIN — INSULIN HUMAN 2 UNITS: 100 INJECTION, SOLUTION PARENTERAL at 22:07

## 2023-01-01 RX ADMIN — FAMOTIDINE 20 MG: 20 TABLET ORAL at 06:18

## 2023-01-01 RX ADMIN — INSULIN HUMAN 1 UNITS: 100 INJECTION, SOLUTION PARENTERAL at 21:42

## 2023-01-01 RX ADMIN — LINEZOLID 600 MG: 600 INJECTION, SOLUTION INTRAVENOUS at 17:39

## 2023-01-01 RX ADMIN — IOHEXOL 40 ML: 350 INJECTION, SOLUTION INTRAVENOUS at 10:05

## 2023-01-01 RX ADMIN — PIPERACILLIN AND TAZOBACTAM 3.38 G: 3; .375 INJECTION, POWDER, FOR SOLUTION INTRAVENOUS at 13:34

## 2023-01-01 RX ADMIN — HYDROMORPHONE HYDROCHLORIDE 1 MG: 1 INJECTION, SOLUTION INTRAMUSCULAR; INTRAVENOUS; SUBCUTANEOUS at 04:23

## 2023-01-01 RX ADMIN — GABAPENTIN 300 MG: 300 CAPSULE ORAL at 21:22

## 2023-01-01 RX ADMIN — HYDRALAZINE HYDROCHLORIDE 10 MG: 20 INJECTION, SOLUTION INTRAMUSCULAR; INTRAVENOUS at 19:48

## 2023-01-01 RX ADMIN — LINEZOLID 600 MG: 600 INJECTION, SOLUTION INTRAVENOUS at 06:21

## 2023-01-01 RX ADMIN — SODIUM CHLORIDE 1000 ML: 4.5 INJECTION, SOLUTION INTRAVENOUS at 12:25

## 2023-01-01 RX ADMIN — FUROSEMIDE 20 MG: 10 INJECTION, SOLUTION INTRAVENOUS at 04:05

## 2023-01-01 RX ADMIN — BUSPIRONE HYDROCHLORIDE 10 MG: 5 TABLET ORAL at 14:53

## 2023-01-01 RX ADMIN — FAMOTIDINE 20 MG: 20 TABLET ORAL at 05:18

## 2023-01-01 RX ADMIN — HYDROXYZINE HYDROCHLORIDE 25 MG: 25 TABLET, FILM COATED ORAL at 15:18

## 2023-01-01 RX ADMIN — METOPROLOL TARTRATE 12.5 MG: 25 TABLET, FILM COATED ORAL at 22:37

## 2023-01-01 RX ADMIN — HEPARIN SODIUM 5000 UNITS: 5000 INJECTION, SOLUTION INTRAVENOUS; SUBCUTANEOUS at 05:52

## 2023-01-01 RX ADMIN — PIPERACILLIN AND TAZOBACTAM 3.38 G: 3; .375 INJECTION, POWDER, FOR SOLUTION INTRAVENOUS at 05:40

## 2023-01-01 RX ADMIN — IOHEXOL 100 ML: 350 INJECTION, SOLUTION INTRAVENOUS at 19:35

## 2023-01-01 RX ADMIN — THIAMINE HCL TAB 100 MG 100 MG: 100 TAB at 05:05

## 2023-01-01 RX ADMIN — HYDROMORPHONE HYDROCHLORIDE 1 MG: 1 INJECTION, SOLUTION INTRAMUSCULAR; INTRAVENOUS; SUBCUTANEOUS at 05:06

## 2023-01-01 RX ADMIN — HYDROMORPHONE HYDROCHLORIDE 1 MG: 1 INJECTION, SOLUTION INTRAMUSCULAR; INTRAVENOUS; SUBCUTANEOUS at 08:06

## 2023-01-01 RX ADMIN — FUROSEMIDE 40 MG: 10 INJECTION INTRAMUSCULAR; INTRAVENOUS at 16:16

## 2023-01-01 RX ADMIN — HYDROMORPHONE HYDROCHLORIDE 0.5 MG: 1 INJECTION, SOLUTION INTRAMUSCULAR; INTRAVENOUS; SUBCUTANEOUS at 06:35

## 2023-01-01 RX ADMIN — HYDRALAZINE HYDROCHLORIDE 10 MG: 20 INJECTION, SOLUTION INTRAMUSCULAR; INTRAVENOUS at 10:43

## 2023-01-01 RX ADMIN — PIPERACILLIN AND TAZOBACTAM 3.38 G: 3; .375 INJECTION, POWDER, FOR SOLUTION INTRAVENOUS at 13:08

## 2023-01-01 RX ADMIN — METOPROLOL TARTRATE 75 MG: 50 TABLET, FILM COATED ORAL at 18:32

## 2023-01-01 RX ADMIN — GABAPENTIN 200 MG: 100 CAPSULE ORAL at 05:19

## 2023-01-01 RX ADMIN — BUSPIRONE HYDROCHLORIDE 10 MG: 5 TABLET ORAL at 06:13

## 2023-01-01 RX ADMIN — DEXTROSE MONOHYDRATE: 50 INJECTION, SOLUTION INTRAVENOUS at 21:19

## 2023-01-01 RX ADMIN — LISINOPRIL 40 MG: 20 TABLET ORAL at 05:05

## 2023-01-01 RX ADMIN — INSULIN HUMAN 1 UNITS: 100 INJECTION, SOLUTION PARENTERAL at 21:00

## 2023-01-01 RX ADMIN — LISINOPRIL 5 MG: 5 TABLET ORAL at 05:22

## 2023-01-01 RX ADMIN — DEXMEDETOMIDINE HYDROCHLORIDE 1.2 MCG/KG/HR: 400 INJECTION INTRAVENOUS at 13:26

## 2023-01-01 RX ADMIN — PIPERACILLIN AND TAZOBACTAM 3.38 G: 3; .375 INJECTION, POWDER, FOR SOLUTION INTRAVENOUS at 20:55

## 2023-01-01 RX ADMIN — LABETALOL HYDROCHLORIDE 10 MG: 5 INJECTION INTRAVENOUS at 17:55

## 2023-01-01 RX ADMIN — OMEPRAZOLE 40 MG: 20 CAPSULE, DELAYED RELEASE ORAL at 21:00

## 2023-01-01 RX ADMIN — FLUOXETINE 20 MG: 20 CAPSULE ORAL at 05:23

## 2023-01-01 RX ADMIN — CLONIDINE HYDROCHLORIDE 0.2 MG: 0.1 TABLET ORAL at 17:00

## 2023-01-01 RX ADMIN — PIPERACILLIN AND TAZOBACTAM 3.38 G: 3; .375 INJECTION, POWDER, FOR SOLUTION INTRAVENOUS at 20:31

## 2023-01-01 RX ADMIN — HYDRALAZINE HYDROCHLORIDE 10 MG: 20 INJECTION, SOLUTION INTRAMUSCULAR; INTRAVENOUS at 04:19

## 2023-01-01 RX ADMIN — CALCIUM GLUCONATE 1 G: 20 INJECTION, SOLUTION INTRAVENOUS at 10:27

## 2023-01-01 RX ADMIN — THIAMINE HCL TAB 100 MG 100 MG: 100 TAB at 05:52

## 2023-01-01 RX ADMIN — PIPERACILLIN AND TAZOBACTAM 3.38 G: 3; .375 INJECTION, POWDER, FOR SOLUTION INTRAVENOUS at 21:33

## 2023-01-01 RX ADMIN — IOHEXOL 100 ML: 350 INJECTION, SOLUTION INTRAVENOUS at 16:05

## 2023-01-01 RX ADMIN — PIPERACILLIN AND TAZOBACTAM 3.38 G: 3; .375 INJECTION, POWDER, FOR SOLUTION INTRAVENOUS at 21:04

## 2023-01-01 RX ADMIN — LORAZEPAM 0.5 MG: 2 INJECTION INTRAMUSCULAR; INTRAVENOUS at 11:17

## 2023-01-01 RX ADMIN — FLUOXETINE 20 MG: 20 CAPSULE ORAL at 05:06

## 2023-01-01 RX ADMIN — METOPROLOL TARTRATE 100 MG: 50 TABLET, FILM COATED ORAL at 17:00

## 2023-01-01 RX ADMIN — FLUOXETINE 20 MG: 20 CAPSULE ORAL at 06:16

## 2023-01-01 RX ADMIN — HYDROXYZINE HYDROCHLORIDE 25 MG: 25 TABLET, FILM COATED ORAL at 06:41

## 2023-01-01 RX ADMIN — ONDANSETRON 4 MG: 2 INJECTION INTRAMUSCULAR; INTRAVENOUS at 00:09

## 2023-01-01 RX ADMIN — GABAPENTIN 300 MG: 300 CAPSULE ORAL at 20:43

## 2023-01-01 RX ADMIN — HEPARIN SODIUM 5000 UNITS: 5000 INJECTION, SOLUTION INTRAVENOUS; SUBCUTANEOUS at 05:16

## 2023-01-01 RX ADMIN — METOPROLOL TARTRATE 12.5 MG: 25 TABLET, FILM COATED ORAL at 18:13

## 2023-01-01 RX ADMIN — PIPERACILLIN AND TAZOBACTAM 3.38 G: 3; .375 INJECTION, POWDER, FOR SOLUTION INTRAVENOUS at 04:10

## 2023-01-01 RX ADMIN — ACETAMINOPHEN 650 MG: 325 TABLET ORAL at 16:02

## 2023-01-01 RX ADMIN — FAMOTIDINE 20 MG: 20 TABLET ORAL at 18:00

## 2023-01-01 RX ADMIN — HEPARIN SODIUM 5000 UNITS: 5000 INJECTION, SOLUTION INTRAVENOUS; SUBCUTANEOUS at 05:22

## 2023-01-01 RX ADMIN — SODIUM CHLORIDE, POTASSIUM CHLORIDE, SODIUM LACTATE AND CALCIUM CHLORIDE 1000 ML: 600; 310; 30; 20 INJECTION, SOLUTION INTRAVENOUS at 18:42

## 2023-01-01 RX ADMIN — HYDROMORPHONE HYDROCHLORIDE 1 MG: 1 INJECTION, SOLUTION INTRAMUSCULAR; INTRAVENOUS; SUBCUTANEOUS at 01:46

## 2023-01-01 RX ADMIN — GABAPENTIN 200 MG: 100 CAPSULE ORAL at 21:53

## 2023-01-01 RX ADMIN — METOPROLOL TARTRATE 12.5 MG: 25 TABLET, FILM COATED ORAL at 05:26

## 2023-01-01 RX ADMIN — HEPARIN SODIUM 5000 UNITS: 5000 INJECTION, SOLUTION INTRAVENOUS; SUBCUTANEOUS at 14:39

## 2023-01-01 RX ADMIN — MORPHINE SULFATE 15 MG: 15 TABLET, FILM COATED, EXTENDED RELEASE ORAL at 18:13

## 2023-01-01 RX ADMIN — LORAZEPAM 0.5 MG: 2 INJECTION INTRAMUSCULAR; INTRAVENOUS at 00:57

## 2023-01-01 RX ADMIN — PIPERACILLIN AND TAZOBACTAM 3.38 G: 3; .375 INJECTION, POWDER, FOR SOLUTION INTRAVENOUS at 13:15

## 2023-01-01 RX ADMIN — HEPARIN SODIUM 5000 UNITS: 5000 INJECTION, SOLUTION INTRAVENOUS; SUBCUTANEOUS at 13:40

## 2023-01-01 RX ADMIN — FAMOTIDINE 20 MG: 10 INJECTION INTRAVENOUS at 18:29

## 2023-01-01 RX ADMIN — CLONIDINE HYDROCHLORIDE 0.2 MG: 0.1 TABLET ORAL at 18:32

## 2023-01-01 RX ADMIN — LISINOPRIL 40 MG: 20 TABLET ORAL at 05:19

## 2023-01-01 RX ADMIN — CALCIUM GLUCONATE 1 G: 20 INJECTION, SOLUTION INTRAVENOUS at 10:34

## 2023-01-01 RX ADMIN — HYDROMORPHONE HYDROCHLORIDE 1 MG: 1 INJECTION, SOLUTION INTRAMUSCULAR; INTRAVENOUS; SUBCUTANEOUS at 17:13

## 2023-01-01 RX ADMIN — HYDROMORPHONE HYDROCHLORIDE 2 MG: 2 INJECTION INTRAMUSCULAR; INTRAVENOUS; SUBCUTANEOUS at 05:25

## 2023-01-01 RX ADMIN — LISINOPRIL 5 MG: 5 TABLET ORAL at 06:29

## 2023-01-01 RX ADMIN — LABETALOL HYDROCHLORIDE 10 MG: 5 INJECTION INTRAVENOUS at 03:19

## 2023-01-01 RX ADMIN — PIPERACILLIN AND TAZOBACTAM 3.38 G: 3; .375 INJECTION, POWDER, FOR SOLUTION INTRAVENOUS at 04:49

## 2023-01-01 RX ADMIN — METOPROLOL TARTRATE 100 MG: 50 TABLET, FILM COATED ORAL at 17:26

## 2023-01-01 RX ADMIN — PIPERACILLIN AND TAZOBACTAM 3.38 G: 3; .375 INJECTION, POWDER, FOR SOLUTION INTRAVENOUS at 05:49

## 2023-01-01 RX ADMIN — OXYCODONE HYDROCHLORIDE 10 MG: 10 TABLET ORAL at 08:32

## 2023-01-01 RX ADMIN — PIPERACILLIN AND TAZOBACTAM 3.38 G: 3; .375 INJECTION, POWDER, FOR SOLUTION INTRAVENOUS at 14:57

## 2023-01-01 RX ADMIN — PIPERACILLIN AND TAZOBACTAM 3.38 G: 3; .375 INJECTION, POWDER, FOR SOLUTION INTRAVENOUS at 22:01

## 2023-01-01 RX ADMIN — THIAMINE HCL TAB 100 MG 100 MG: 100 TAB at 06:17

## 2023-01-01 RX ADMIN — POTASSIUM PHOSPHATE, MONOBASIC AND POTASSIUM PHOSPHATE, DIBASIC 30 MMOL: 224; 236 INJECTION, SOLUTION, CONCENTRATE INTRAVENOUS at 11:52

## 2023-01-01 RX ADMIN — METRONIDAZOLE 500 MG: 5 INJECTION, SOLUTION INTRAVENOUS at 18:09

## 2023-01-01 RX ADMIN — HEPARIN SODIUM 5000 UNITS: 5000 INJECTION, SOLUTION INTRAVENOUS; SUBCUTANEOUS at 06:19

## 2023-01-01 RX ADMIN — HYDRALAZINE HYDROCHLORIDE 10 MG: 20 INJECTION, SOLUTION INTRAMUSCULAR; INTRAVENOUS at 22:54

## 2023-01-01 RX ADMIN — HEPARIN SODIUM 5000 UNITS: 5000 INJECTION, SOLUTION INTRAVENOUS; SUBCUTANEOUS at 14:35

## 2023-01-01 RX ADMIN — HEPARIN SODIUM 5000 UNITS: 5000 INJECTION, SOLUTION INTRAVENOUS; SUBCUTANEOUS at 05:08

## 2023-01-01 RX ADMIN — METOPROLOL TARTRATE 100 MG: 50 TABLET, FILM COATED ORAL at 05:05

## 2023-01-01 RX ADMIN — HEPARIN SODIUM 5000 UNITS: 5000 INJECTION, SOLUTION INTRAVENOUS; SUBCUTANEOUS at 22:57

## 2023-01-01 RX ADMIN — METOPROLOL TARTRATE 5 MG: 5 INJECTION INTRAVENOUS at 09:52

## 2023-01-01 RX ADMIN — DEXTROSE MONOHYDRATE: 50 INJECTION, SOLUTION INTRAVENOUS at 08:09

## 2023-01-01 RX ADMIN — CLONIDINE HYDROCHLORIDE 0.1 MG: 0.1 TABLET ORAL at 13:31

## 2023-01-01 RX ADMIN — GABAPENTIN 200 MG: 100 CAPSULE ORAL at 20:57

## 2023-01-01 RX ADMIN — FUROSEMIDE 20 MG: 10 INJECTION, SOLUTION INTRAVENOUS at 16:51

## 2023-01-01 RX ADMIN — GABAPENTIN 300 MG: 300 CAPSULE ORAL at 21:53

## 2023-01-01 RX ADMIN — FAMOTIDINE 20 MG: 10 INJECTION INTRAVENOUS at 05:06

## 2023-01-01 RX ADMIN — HYDROMORPHONE HYDROCHLORIDE 1 MG: 1 INJECTION, SOLUTION INTRAMUSCULAR; INTRAVENOUS; SUBCUTANEOUS at 11:31

## 2023-01-01 RX ADMIN — FAMOTIDINE 20 MG: 10 INJECTION, SOLUTION INTRAVENOUS at 18:31

## 2023-01-01 RX ADMIN — PIPERACILLIN AND TAZOBACTAM 3.38 G: 3; .375 INJECTION, POWDER, FOR SOLUTION INTRAVENOUS at 14:11

## 2023-01-01 RX ADMIN — LISINOPRIL 20 MG: 20 TABLET ORAL at 08:42

## 2023-01-01 RX ADMIN — FAMOTIDINE 20 MG: 10 INJECTION INTRAVENOUS at 06:22

## 2023-01-01 RX ADMIN — FLUOXETINE 20 MG: 20 CAPSULE ORAL at 14:05

## 2023-01-01 RX ADMIN — INSULIN HUMAN 2 UNITS: 100 INJECTION, SOLUTION PARENTERAL at 11:56

## 2023-01-01 RX ADMIN — SODIUM CHLORIDE, POTASSIUM CHLORIDE, SODIUM LACTATE AND CALCIUM CHLORIDE 500 ML: 600; 310; 30; 20 INJECTION, SOLUTION INTRAVENOUS at 14:30

## 2023-01-01 RX ADMIN — LABETALOL HYDROCHLORIDE 10 MG: 5 INJECTION INTRAVENOUS at 08:05

## 2023-01-01 RX ADMIN — HYDROMORPHONE HYDROCHLORIDE 0.5 MG: 1 INJECTION, SOLUTION INTRAMUSCULAR; INTRAVENOUS; SUBCUTANEOUS at 18:39

## 2023-01-01 RX ADMIN — VANCOMYCIN HYDROCHLORIDE 2500 MG: 5 INJECTION, POWDER, LYOPHILIZED, FOR SOLUTION INTRAVENOUS at 05:23

## 2023-01-01 RX ADMIN — OXYCODONE 5 MG: 5 TABLET ORAL at 21:01

## 2023-01-01 RX ADMIN — HEPARIN SODIUM 5000 UNITS: 5000 INJECTION, SOLUTION INTRAVENOUS; SUBCUTANEOUS at 21:10

## 2023-01-01 RX ADMIN — HEPARIN SODIUM 5000 UNITS: 5000 INJECTION, SOLUTION INTRAVENOUS; SUBCUTANEOUS at 22:55

## 2023-01-01 RX ADMIN — HEPARIN SODIUM 5000 UNITS: 5000 INJECTION, SOLUTION INTRAVENOUS; SUBCUTANEOUS at 14:05

## 2023-01-01 RX ADMIN — PIPERACILLIN AND TAZOBACTAM 3.38 G: 3; .375 INJECTION, POWDER, FOR SOLUTION INTRAVENOUS at 04:58

## 2023-01-01 RX ADMIN — LISINOPRIL 20 MG: 20 TABLET ORAL at 06:19

## 2023-01-01 RX ADMIN — HYDROMORPHONE HYDROCHLORIDE 1 MG: 1 INJECTION, SOLUTION INTRAMUSCULAR; INTRAVENOUS; SUBCUTANEOUS at 22:41

## 2023-01-01 RX ADMIN — HALOPERIDOL LACTATE 2 MG: 5 INJECTION, SOLUTION INTRAMUSCULAR at 10:50

## 2023-01-01 RX ADMIN — HYDRALAZINE HYDROCHLORIDE 10 MG: 20 INJECTION, SOLUTION INTRAMUSCULAR; INTRAVENOUS at 04:54

## 2023-01-01 RX ADMIN — HYDROMORPHONE HYDROCHLORIDE 2 MG: 2 INJECTION INTRAMUSCULAR; INTRAVENOUS; SUBCUTANEOUS at 00:40

## 2023-01-01 RX ADMIN — INSULIN HUMAN 1 UNITS: 100 INJECTION, SOLUTION PARENTERAL at 17:20

## 2023-01-01 RX ADMIN — HYDRALAZINE HYDROCHLORIDE 10 MG: 20 INJECTION, SOLUTION INTRAMUSCULAR; INTRAVENOUS at 08:04

## 2023-01-01 RX ADMIN — FAMOTIDINE 20 MG: 20 TABLET ORAL at 18:15

## 2023-01-01 RX ADMIN — HEPARIN SODIUM 5000 UNITS: 5000 INJECTION, SOLUTION INTRAVENOUS; SUBCUTANEOUS at 13:35

## 2023-01-01 RX ADMIN — HEPARIN SODIUM 5000 UNITS: 5000 INJECTION, SOLUTION INTRAVENOUS; SUBCUTANEOUS at 15:27

## 2023-01-01 RX ADMIN — PIPERACILLIN AND TAZOBACTAM 3.38 G: 3; .375 INJECTION, POWDER, FOR SOLUTION INTRAVENOUS at 02:30

## 2023-01-01 RX ADMIN — LABETALOL HYDROCHLORIDE 10 MG: 5 INJECTION INTRAVENOUS at 22:02

## 2023-01-01 RX ADMIN — FUROSEMIDE 20 MG: 10 INJECTION, SOLUTION INTRAVENOUS at 11:59

## 2023-01-01 RX ADMIN — SENNOSIDES AND DOCUSATE SODIUM 2 TABLET: 50; 8.6 TABLET ORAL at 06:13

## 2023-01-01 RX ADMIN — HYDRALAZINE HYDROCHLORIDE 10 MG: 20 INJECTION, SOLUTION INTRAMUSCULAR; INTRAVENOUS at 20:15

## 2023-01-01 RX ADMIN — FLUOXETINE 20 MG: 20 CAPSULE ORAL at 05:42

## 2023-01-01 RX ADMIN — HALOPERIDOL LACTATE 2 MG: 5 INJECTION, SOLUTION INTRAMUSCULAR at 14:08

## 2023-01-01 RX ADMIN — OXYCODONE 5 MG: 5 TABLET ORAL at 00:10

## 2023-01-01 RX ADMIN — LABETALOL HYDROCHLORIDE 10 MG: 5 INJECTION INTRAVENOUS at 12:18

## 2023-01-01 RX ADMIN — INSULIN HUMAN 1 UNITS: 100 INJECTION, SOLUTION PARENTERAL at 11:00

## 2023-01-01 RX ADMIN — LABETALOL HYDROCHLORIDE 10 MG: 5 INJECTION INTRAVENOUS at 10:06

## 2023-01-01 RX ADMIN — PIPERACILLIN AND TAZOBACTAM 3.38 G: 3; .375 INJECTION, POWDER, FOR SOLUTION INTRAVENOUS at 04:35

## 2023-01-01 RX ADMIN — AMLODIPINE BESYLATE 10 MG: 5 TABLET ORAL at 11:00

## 2023-01-01 RX ADMIN — INSULIN HUMAN 1 UNITS: 100 INJECTION, SOLUTION PARENTERAL at 20:27

## 2023-01-01 RX ADMIN — FAMOTIDINE 20 MG: 10 INJECTION INTRAVENOUS at 17:22

## 2023-01-01 RX ADMIN — HEPARIN SODIUM 5000 UNITS: 5000 INJECTION, SOLUTION INTRAVENOUS; SUBCUTANEOUS at 13:39

## 2023-01-01 RX ADMIN — INSULIN HUMAN 2 UNITS: 100 INJECTION, SOLUTION PARENTERAL at 18:32

## 2023-01-01 RX ADMIN — LORAZEPAM 1 MG: 2 INJECTION INTRAMUSCULAR; INTRAVENOUS at 05:14

## 2023-01-01 RX ADMIN — HYDROMORPHONE HYDROCHLORIDE 0.5 MG: 1 INJECTION, SOLUTION INTRAMUSCULAR; INTRAVENOUS; SUBCUTANEOUS at 09:31

## 2023-01-01 RX ADMIN — DEXMEDETOMIDINE HYDROCHLORIDE 1 MCG/KG/HR: 400 INJECTION INTRAVENOUS at 08:37

## 2023-01-01 RX ADMIN — LINEZOLID 600 MG: 600 INJECTION, SOLUTION INTRAVENOUS at 05:10

## 2023-01-01 RX ADMIN — HEPARIN SODIUM 5000 UNITS: 5000 INJECTION, SOLUTION INTRAVENOUS; SUBCUTANEOUS at 05:18

## 2023-01-01 RX ADMIN — LORAZEPAM 0.5 MG: 2 INJECTION INTRAMUSCULAR; INTRAVENOUS at 20:37

## 2023-01-01 RX ADMIN — ALBUTEROL SULFATE 2.5 MG: 2.5 SOLUTION RESPIRATORY (INHALATION) at 02:34

## 2023-01-01 RX ADMIN — HEPARIN SODIUM 5000 UNITS: 5000 INJECTION, SOLUTION INTRAVENOUS; SUBCUTANEOUS at 05:04

## 2023-01-01 RX ADMIN — GABAPENTIN 300 MG: 300 CAPSULE ORAL at 21:09

## 2023-01-01 RX ADMIN — FUROSEMIDE 20 MG: 10 INJECTION, SOLUTION INTRAVENOUS at 18:33

## 2023-01-01 RX ADMIN — CLONIDINE HYDROCHLORIDE 0.1 MG: 0.1 TABLET ORAL at 14:56

## 2023-01-01 RX ADMIN — ALBUTEROL SULFATE 2.5 MG: 2.5 SOLUTION RESPIRATORY (INHALATION) at 22:18

## 2023-01-01 RX ADMIN — LABETALOL HYDROCHLORIDE 10 MG: 5 INJECTION INTRAVENOUS at 15:21

## 2023-01-01 RX ADMIN — LABETALOL HYDROCHLORIDE 10 MG: 5 INJECTION INTRAVENOUS at 15:42

## 2023-01-01 RX ADMIN — IOHEXOL 51 ML: 350 INJECTION, SOLUTION INTRAVENOUS at 04:36

## 2023-01-01 RX ADMIN — GABAPENTIN 300 MG: 300 CAPSULE ORAL at 20:50

## 2023-01-01 RX ADMIN — SODIUM CHLORIDE, POTASSIUM CHLORIDE, SODIUM LACTATE AND CALCIUM CHLORIDE: 600; 310; 30; 20 INJECTION, SOLUTION INTRAVENOUS at 21:15

## 2023-01-01 RX ADMIN — GABAPENTIN 300 MG: 300 CAPSULE ORAL at 20:56

## 2023-01-01 RX ADMIN — PIPERACILLIN AND TAZOBACTAM 3.38 G: 3; .375 INJECTION, POWDER, FOR SOLUTION INTRAVENOUS at 21:49

## 2023-01-01 RX ADMIN — FAMOTIDINE 20 MG: 10 INJECTION, SOLUTION INTRAVENOUS at 17:13

## 2023-01-01 RX ADMIN — FLUOXETINE 20 MG: 20 CAPSULE ORAL at 06:14

## 2023-01-01 RX ADMIN — INSULIN HUMAN 1 UNITS: 100 INJECTION, SOLUTION PARENTERAL at 05:50

## 2023-01-01 RX ADMIN — PIPERACILLIN AND TAZOBACTAM 3.38 G: 3; .375 INJECTION, POWDER, FOR SOLUTION INTRAVENOUS at 12:52

## 2023-01-01 RX ADMIN — HEPARIN SODIUM 5000 UNITS: 5000 INJECTION, SOLUTION INTRAVENOUS; SUBCUTANEOUS at 13:16

## 2023-01-01 RX ADMIN — PIPERACILLIN AND TAZOBACTAM 3.38 G: 3; .375 INJECTION, POWDER, FOR SOLUTION INTRAVENOUS at 20:39

## 2023-01-01 RX ADMIN — LABETALOL HYDROCHLORIDE 10 MG: 5 INJECTION INTRAVENOUS at 04:51

## 2023-01-01 RX ADMIN — GABAPENTIN 200 MG: 100 CAPSULE ORAL at 21:07

## 2023-01-01 RX ADMIN — ALBUTEROL SULFATE 2.5 MG: 2.5 SOLUTION RESPIRATORY (INHALATION) at 07:31

## 2023-01-01 RX ADMIN — INSULIN HUMAN 2 UNITS: 100 INJECTION, SOLUTION PARENTERAL at 06:30

## 2023-01-01 RX ADMIN — HYDROMORPHONE HYDROCHLORIDE 1 MG: 1 INJECTION, SOLUTION INTRAMUSCULAR; INTRAVENOUS; SUBCUTANEOUS at 14:36

## 2023-01-01 RX ADMIN — BUSPIRONE HYDROCHLORIDE 10 MG: 5 TABLET ORAL at 18:13

## 2023-01-01 RX ADMIN — FLUOXETINE 20 MG: 20 CAPSULE ORAL at 05:18

## 2023-01-01 RX ADMIN — THIAMINE HCL TAB 100 MG 100 MG: 100 TAB at 16:01

## 2023-01-01 RX ADMIN — SENNOSIDES AND DOCUSATE SODIUM 2 TABLET: 50; 8.6 TABLET ORAL at 17:35

## 2023-01-01 RX ADMIN — LABETALOL HYDROCHLORIDE 10 MG: 5 INJECTION INTRAVENOUS at 00:59

## 2023-01-01 RX ADMIN — SODIUM CHLORIDE: 4.5 INJECTION, SOLUTION INTRAVENOUS at 02:55

## 2023-01-01 RX ADMIN — HYDROMORPHONE HYDROCHLORIDE 0.5 MG: 1 INJECTION, SOLUTION INTRAMUSCULAR; INTRAVENOUS; SUBCUTANEOUS at 22:43

## 2023-01-01 RX ADMIN — CALCIUM GLUCONATE 1 G: 20 INJECTION, SOLUTION INTRAVENOUS at 08:20

## 2023-01-01 RX ADMIN — METOPROLOL TARTRATE 50 MG: 50 TABLET, FILM COATED ORAL at 17:24

## 2023-01-01 RX ADMIN — FAMOTIDINE 20 MG: 10 INJECTION, SOLUTION INTRAVENOUS at 05:07

## 2023-01-01 RX ADMIN — METOPROLOL TARTRATE 12.5 MG: 25 TABLET, FILM COATED ORAL at 06:29

## 2023-01-01 RX ADMIN — GABAPENTIN 200 MG: 100 CAPSULE ORAL at 06:17

## 2023-01-01 RX ADMIN — HEPARIN SODIUM 5000 UNITS: 5000 INJECTION, SOLUTION INTRAVENOUS; SUBCUTANEOUS at 21:27

## 2023-01-01 RX ADMIN — CLONIDINE HYDROCHLORIDE 0.2 MG: 0.1 TABLET ORAL at 06:16

## 2023-01-01 RX ADMIN — GABAPENTIN 200 MG: 100 CAPSULE ORAL at 06:21

## 2023-01-01 RX ADMIN — LORAZEPAM 1 MG: 2 INJECTION INTRAMUSCULAR; INTRAVENOUS at 22:24

## 2023-01-01 RX ADMIN — HEPARIN SODIUM 5000 UNITS: 5000 INJECTION, SOLUTION INTRAVENOUS; SUBCUTANEOUS at 21:00

## 2023-01-01 RX ADMIN — LINEZOLID 600 MG: 600 INJECTION, SOLUTION INTRAVENOUS at 17:19

## 2023-01-01 RX ADMIN — ACETAMINOPHEN 650 MG: 325 TABLET ORAL at 20:18

## 2023-01-01 RX ADMIN — LISINOPRIL 20 MG: 20 TABLET ORAL at 06:17

## 2023-01-01 RX ADMIN — INSULIN HUMAN 2 UNITS: 100 INJECTION, SOLUTION PARENTERAL at 12:32

## 2023-01-01 RX ADMIN — LORAZEPAM 0.5 MG: 2 INJECTION INTRAMUSCULAR; INTRAVENOUS at 06:43

## 2023-01-01 RX ADMIN — LINEZOLID 600 MG: 600 INJECTION, SOLUTION INTRAVENOUS at 04:14

## 2023-01-01 RX ADMIN — FAMOTIDINE 20 MG: 20 TABLET ORAL at 18:02

## 2023-01-01 RX ADMIN — HYDROMORPHONE HYDROCHLORIDE 1 MG: 1 INJECTION, SOLUTION INTRAMUSCULAR; INTRAVENOUS; SUBCUTANEOUS at 13:41

## 2023-01-01 RX ADMIN — HYDROMORPHONE HYDROCHLORIDE 1 MG: 1 INJECTION, SOLUTION INTRAMUSCULAR; INTRAVENOUS; SUBCUTANEOUS at 09:50

## 2023-01-01 RX ADMIN — INSULIN HUMAN 2 UNITS: 100 INJECTION, SOLUTION PARENTERAL at 17:57

## 2023-01-01 RX ADMIN — CLONIDINE HYDROCHLORIDE 0.2 MG: 0.1 TABLET ORAL at 17:25

## 2023-01-01 RX ADMIN — CLONIDINE HYDROCHLORIDE 0.2 MG: 0.1 TABLET ORAL at 18:15

## 2023-01-01 RX ADMIN — ACETAMINOPHEN 650 MG: 325 TABLET ORAL at 17:58

## 2023-01-01 RX ADMIN — FLUOXETINE 20 MG: 20 CAPSULE ORAL at 06:19

## 2023-01-01 RX ADMIN — METOPROLOL TARTRATE 75 MG: 50 TABLET, FILM COATED ORAL at 17:39

## 2023-01-01 RX ADMIN — LABETALOL HYDROCHLORIDE 10 MG: 5 INJECTION INTRAVENOUS at 15:37

## 2023-01-01 RX ADMIN — OMEPRAZOLE 40 MG: 20 CAPSULE, DELAYED RELEASE ORAL at 05:25

## 2023-01-01 RX ADMIN — LISINOPRIL 5 MG: 5 TABLET ORAL at 05:40

## 2023-01-01 RX ADMIN — HEPARIN SODIUM 5000 UNITS: 5000 INJECTION, SOLUTION INTRAVENOUS; SUBCUTANEOUS at 21:06

## 2023-01-01 RX ADMIN — POTASSIUM CHLORIDE 10 MEQ: 7.46 INJECTION, SOLUTION INTRAVENOUS at 08:16

## 2023-01-01 RX ADMIN — SENNOSIDES AND DOCUSATE SODIUM 2 TABLET: 50; 8.6 TABLET ORAL at 18:13

## 2023-01-01 RX ADMIN — HEPARIN SODIUM 5000 UNITS: 5000 INJECTION, SOLUTION INTRAVENOUS; SUBCUTANEOUS at 14:04

## 2023-01-01 RX ADMIN — GABAPENTIN 300 MG: 300 CAPSULE ORAL at 22:37

## 2023-01-01 RX ADMIN — ATORVASTATIN CALCIUM 40 MG: 40 TABLET, FILM COATED ORAL at 21:22

## 2023-01-01 RX ADMIN — METOPROLOL TARTRATE 100 MG: 50 TABLET, FILM COATED ORAL at 17:25

## 2023-01-01 RX ADMIN — LABETALOL HYDROCHLORIDE 10 MG: 5 INJECTION INTRAVENOUS at 22:00

## 2023-01-01 RX ADMIN — HYDROMORPHONE HYDROCHLORIDE 1 MG: 1 INJECTION, SOLUTION INTRAMUSCULAR; INTRAVENOUS; SUBCUTANEOUS at 04:02

## 2023-01-01 RX ADMIN — FUROSEMIDE 40 MG: 10 INJECTION, SOLUTION INTRAVENOUS at 06:21

## 2023-01-01 RX ADMIN — METOPROLOL TARTRATE 25 MG: 25 TABLET, FILM COATED ORAL at 04:43

## 2023-01-01 RX ADMIN — HYDRALAZINE HYDROCHLORIDE 10 MG: 20 INJECTION, SOLUTION INTRAMUSCULAR; INTRAVENOUS at 14:40

## 2023-01-01 RX ADMIN — INSULIN HUMAN 1 UNITS: 100 INJECTION, SOLUTION PARENTERAL at 06:12

## 2023-01-01 RX ADMIN — GABAPENTIN 200 MG: 100 CAPSULE ORAL at 20:43

## 2023-01-01 RX ADMIN — ONDANSETRON 4 MG: 2 INJECTION INTRAMUSCULAR; INTRAVENOUS at 18:41

## 2023-01-01 RX ADMIN — ATORVASTATIN CALCIUM 40 MG: 40 TABLET, FILM COATED ORAL at 21:09

## 2023-01-01 RX ADMIN — HYDROMORPHONE HYDROCHLORIDE 1 MG: 1 INJECTION, SOLUTION INTRAMUSCULAR; INTRAVENOUS; SUBCUTANEOUS at 22:15

## 2023-01-01 RX ADMIN — HYDROMORPHONE HYDROCHLORIDE 1 MG: 1 INJECTION, SOLUTION INTRAMUSCULAR; INTRAVENOUS; SUBCUTANEOUS at 14:57

## 2023-01-01 RX ADMIN — GABAPENTIN 200 MG: 100 CAPSULE ORAL at 06:13

## 2023-01-01 RX ADMIN — HEPARIN SODIUM 5000 UNITS: 5000 INJECTION, SOLUTION INTRAVENOUS; SUBCUTANEOUS at 21:49

## 2023-01-01 RX ADMIN — OXYCODONE HYDROCHLORIDE 10 MG: 10 TABLET ORAL at 05:21

## 2023-01-01 RX ADMIN — OXYCODONE HYDROCHLORIDE 10 MG: 10 TABLET ORAL at 15:47

## 2023-01-01 RX ADMIN — LABETALOL HYDROCHLORIDE 10 MG: 5 INJECTION INTRAVENOUS at 00:18

## 2023-01-01 RX ADMIN — FAMOTIDINE 20 MG: 20 TABLET ORAL at 17:24

## 2023-01-01 RX ADMIN — HEPARIN SODIUM 5000 UNITS: 5000 INJECTION, SOLUTION INTRAVENOUS; SUBCUTANEOUS at 16:51

## 2023-01-01 RX ADMIN — FUROSEMIDE 40 MG: 10 INJECTION INTRAMUSCULAR; INTRAVENOUS at 21:45

## 2023-01-01 RX ADMIN — PIPERACILLIN AND TAZOBACTAM 3.38 G: 3; .375 INJECTION, POWDER, FOR SOLUTION INTRAVENOUS at 04:17

## 2023-01-01 RX ADMIN — GABAPENTIN 200 MG: 100 CAPSULE ORAL at 05:06

## 2023-01-01 RX ADMIN — METOPROLOL TARTRATE 75 MG: 50 TABLET, FILM COATED ORAL at 06:20

## 2023-01-01 RX ADMIN — HEPARIN SODIUM 5000 UNITS: 5000 INJECTION, SOLUTION INTRAVENOUS; SUBCUTANEOUS at 21:46

## 2023-01-01 RX ADMIN — OXYCODONE 5 MG: 5 TABLET ORAL at 04:05

## 2023-01-01 RX ADMIN — IOHEXOL 80 ML: 350 INJECTION, SOLUTION INTRAVENOUS at 09:54

## 2023-01-01 RX ADMIN — DEXMEDETOMIDINE HYDROCHLORIDE 1.5 MCG/KG/HR: 400 INJECTION INTRAVENOUS at 17:39

## 2023-01-01 RX ADMIN — ATORVASTATIN CALCIUM 40 MG: 40 TABLET, FILM COATED ORAL at 20:44

## 2023-01-01 RX ADMIN — PIPERACILLIN AND TAZOBACTAM 3.38 G: 3; .375 INJECTION, POWDER, FOR SOLUTION INTRAVENOUS at 13:07

## 2023-01-01 RX ADMIN — HYDROMORPHONE HYDROCHLORIDE 0.5 MG: 1 INJECTION, SOLUTION INTRAMUSCULAR; INTRAVENOUS; SUBCUTANEOUS at 07:28

## 2023-01-01 RX ADMIN — METOPROLOL TARTRATE 75 MG: 50 TABLET, FILM COATED ORAL at 06:17

## 2023-01-01 RX ADMIN — FUROSEMIDE 40 MG: 10 INJECTION INTRAMUSCULAR; INTRAVENOUS at 18:36

## 2023-01-01 RX ADMIN — PIPERACILLIN AND TAZOBACTAM 3.38 G: 3; .375 INJECTION, POWDER, FOR SOLUTION INTRAVENOUS at 12:19

## 2023-01-01 RX ADMIN — ACETAMINOPHEN 650 MG: 325 TABLET ORAL at 09:51

## 2023-01-01 RX ADMIN — LINEZOLID 600 MG: 600 INJECTION, SOLUTION INTRAVENOUS at 18:20

## 2023-01-01 RX ADMIN — CALCIUM GLUCONATE 1 G: 20 INJECTION, SOLUTION INTRAVENOUS at 07:38

## 2023-01-01 RX ADMIN — INSULIN HUMAN 1 UNITS: 100 INJECTION, SOLUTION PARENTERAL at 06:27

## 2023-01-01 RX ADMIN — PIPERACILLIN AND TAZOBACTAM 3.38 G: 3; .375 INJECTION, POWDER, FOR SOLUTION INTRAVENOUS at 04:18

## 2023-01-01 RX ADMIN — INSULIN HUMAN 2 UNITS: 100 INJECTION, SOLUTION PARENTERAL at 06:06

## 2023-01-01 RX ADMIN — HEPARIN SODIUM 5000 UNITS: 5000 INJECTION, SOLUTION INTRAVENOUS; SUBCUTANEOUS at 21:23

## 2023-01-01 RX ADMIN — HYDRALAZINE HYDROCHLORIDE 10 MG: 20 INJECTION, SOLUTION INTRAMUSCULAR; INTRAVENOUS at 10:52

## 2023-01-01 RX ADMIN — SENNOSIDES AND DOCUSATE SODIUM 2 TABLET: 50; 8.6 TABLET ORAL at 22:37

## 2023-01-01 RX ADMIN — LINEZOLID 600 MG: 600 INJECTION, SOLUTION INTRAVENOUS at 18:41

## 2023-01-01 RX ADMIN — FAMOTIDINE 20 MG: 20 TABLET ORAL at 17:26

## 2023-01-01 RX ADMIN — GABAPENTIN 200 MG: 100 CAPSULE ORAL at 21:10

## 2023-01-01 RX ADMIN — HEPARIN SODIUM 5000 UNITS: 5000 INJECTION, SOLUTION INTRAVENOUS; SUBCUTANEOUS at 22:37

## 2023-01-01 RX ADMIN — SODIUM CHLORIDE: 9 INJECTION, SOLUTION INTRAVENOUS at 21:47

## 2023-01-01 RX ADMIN — INSULIN HUMAN 1 UNITS: 100 INJECTION, SOLUTION PARENTERAL at 12:15

## 2023-01-01 RX ADMIN — THIAMINE HCL TAB 100 MG 100 MG: 100 TAB at 05:18

## 2023-01-01 RX ADMIN — DEXTROSE MONOHYDRATE: 50 INJECTION, SOLUTION INTRAVENOUS at 10:01

## 2023-01-01 RX ADMIN — ATORVASTATIN CALCIUM 40 MG: 40 TABLET, FILM COATED ORAL at 20:57

## 2023-01-01 RX ADMIN — POTASSIUM CHLORIDE 10 MEQ: 7.46 INJECTION, SOLUTION INTRAVENOUS at 10:59

## 2023-01-01 RX ADMIN — THIAMINE HCL TAB 100 MG 100 MG: 100 TAB at 06:22

## 2023-01-01 RX ADMIN — SODIUM PHOSPHATE, MONOBASIC, MONOHYDRATE AND SODIUM PHOSPHATE, DIBASIC, ANHYDROUS 30 MMOL: 276; 142 INJECTION, SOLUTION INTRAVENOUS at 05:16

## 2023-01-01 RX ADMIN — HYDROMORPHONE HYDROCHLORIDE 1 MG: 1 INJECTION, SOLUTION INTRAMUSCULAR; INTRAVENOUS; SUBCUTANEOUS at 11:13

## 2023-01-01 RX ADMIN — HUMAN ALBUMIN MICROSPHERES AND PERFLUTREN 3 ML: 10; .22 INJECTION, SOLUTION INTRAVENOUS at 11:23

## 2023-01-01 RX ADMIN — CLONIDINE HYDROCHLORIDE 0.1 MG: 0.1 TABLET ORAL at 05:53

## 2023-01-01 RX ADMIN — LORAZEPAM 0.5 MG: 2 INJECTION INTRAMUSCULAR; INTRAVENOUS at 20:45

## 2023-01-01 RX ADMIN — LORAZEPAM 0.5 MG: 2 INJECTION INTRAMUSCULAR; INTRAVENOUS at 15:27

## 2023-01-01 RX ADMIN — PIPERACILLIN AND TAZOBACTAM 3.38 G: 3; .375 INJECTION, POWDER, FOR SOLUTION INTRAVENOUS at 06:18

## 2023-01-01 RX ADMIN — PIPERACILLIN AND TAZOBACTAM 3.38 G: 3; .375 INJECTION, POWDER, FOR SOLUTION INTRAVENOUS at 05:50

## 2023-01-01 RX ADMIN — OXYCODONE 5 MG: 5 TABLET ORAL at 20:57

## 2023-01-01 RX ADMIN — CALCIUM GLUCONATE 1 G: 20 INJECTION, SOLUTION INTRAVENOUS at 08:01

## 2023-01-01 ASSESSMENT — ENCOUNTER SYMPTOMS
BLURRED VISION: 0
SORE THROAT: 0
CONSTIPATION: 0
SENSORY CHANGE: 0
PHOTOPHOBIA: 0
BLURRED VISION: 0
CONSTIPATION: 0
HEADACHES: 0
CLAUDICATION: 0
ABDOMINAL PAIN: 1
HEARTBURN: 0
COUGH: 0
DEPRESSION: 0
SPEECH CHANGE: 0
HEARTBURN: 0
MYALGIAS: 0
HEADACHES: 0
CLAUDICATION: 0
SORE THROAT: 0
PHOTOPHOBIA: 0
SHORTNESS OF BREATH: 0
FEVER: 0
INSOMNIA: 0
SHORTNESS OF BREATH: 0
COUGH: 0
VOMITING: 0
WEAKNESS: 0
SHORTNESS OF BREATH: 0
WEAKNESS: 1
CLAUDICATION: 0
WEAKNESS: 0
BLURRED VISION: 0
HALLUCINATIONS: 0
FEVER: 0
SORE THROAT: 0
INSOMNIA: 0
CONSTIPATION: 0
FEVER: 0
ABDOMINAL PAIN: 0
DIAPHORESIS: 1
NAUSEA: 1
HEADACHES: 0
VOMITING: 0
CLAUDICATION: 0
PHOTOPHOBIA: 0
NAUSEA: 0
SORE THROAT: 0
NERVOUS/ANXIOUS: 0
FEVER: 0
INSOMNIA: 0
CHILLS: 0
NERVOUS/ANXIOUS: 1
NERVOUS/ANXIOUS: 0
SORE THROAT: 0
SENSORY CHANGE: 0
WEAKNESS: 0
NERVOUS/ANXIOUS: 0
DEPRESSION: 0
MYALGIAS: 0
VOMITING: 0
PHOTOPHOBIA: 0
BLOOD IN STOOL: 0
HEARTBURN: 0
NERVOUS/ANXIOUS: 0
SPEECH CHANGE: 0
HEADACHES: 0
CONSTIPATION: 0
SHORTNESS OF BREATH: 0
BLURRED VISION: 0
COUGH: 0
SHORTNESS OF BREATH: 0
WEAKNESS: 0
COUGH: 0
DIZZINESS: 0
DIARRHEA: 0
HEARTBURN: 0
HEADACHES: 0
SENSORY CHANGE: 0
INSOMNIA: 0
VOMITING: 0
COUGH: 0
DIZZINESS: 0
SPEECH CHANGE: 0
FOCAL WEAKNESS: 1
WEAKNESS: 0
FOCAL WEAKNESS: 0
BRUISES/BLEEDS EASILY: 0
DIZZINESS: 0
DIZZINESS: 0
INSOMNIA: 0
ABDOMINAL PAIN: 0
VOMITING: 0
DIARRHEA: 0
MYALGIAS: 0
SHORTNESS OF BREATH: 0
DIARRHEA: 0
CHILLS: 0
NERVOUS/ANXIOUS: 0
DEPRESSION: 0
SENSORY CHANGE: 0
COUGH: 0
HEARTBURN: 0
BACK PAIN: 0
ABDOMINAL PAIN: 1
HEARTBURN: 0
SENSORY CHANGE: 0
CHILLS: 0
CLAUDICATION: 0
EYE REDNESS: 0
FEVER: 0
CHILLS: 0
ABDOMINAL PAIN: 0
DEPRESSION: 0
FEVER: 0
FEVER: 0
COUGH: 0
PALPITATIONS: 0
VOMITING: 0
HEADACHES: 0
STRIDOR: 0
ABDOMINAL PAIN: 1
PHOTOPHOBIA: 0
DEPRESSION: 0
NAUSEA: 0
PHOTOPHOBIA: 0
DIARRHEA: 0
DEPRESSION: 0
MYALGIAS: 0
DIZZINESS: 0
COUGH: 0
SHORTNESS OF BREATH: 0
FEVER: 0
CONSTIPATION: 0
DIARRHEA: 0
DIZZINESS: 0
CHILLS: 0
MYALGIAS: 0
SORE THROAT: 0
HEARTBURN: 0
MYALGIAS: 0
DEPRESSION: 0
BLURRED VISION: 0
INSOMNIA: 0
DIZZINESS: 0
SPEECH CHANGE: 0
NAUSEA: 1
MYALGIAS: 0
MYALGIAS: 0
CHILLS: 0
BLURRED VISION: 0
ABDOMINAL PAIN: 0
ABDOMINAL PAIN: 0
WEAKNESS: 0
CONSTIPATION: 0
VOMITING: 0
SORE THROAT: 0
FLANK PAIN: 0
DIARRHEA: 0
NAUSEA: 0
CHILLS: 0
SPEECH CHANGE: 0
HEADACHES: 0
SHORTNESS OF BREATH: 0
CLAUDICATION: 0
ABDOMINAL PAIN: 0
CHILLS: 0
SPEECH CHANGE: 0
ABDOMINAL PAIN: 1
NERVOUS/ANXIOUS: 0
NAUSEA: 0
SENSORY CHANGE: 0
EYE DISCHARGE: 0
DIARRHEA: 0

## 2023-01-01 ASSESSMENT — PATIENT HEALTH QUESTIONNAIRE - PHQ9
8. MOVING OR SPEAKING SO SLOWLY THAT OTHER PEOPLE COULD HAVE NOTICED. OR THE OPPOSITE, BEING SO FIGETY OR RESTLESS THAT YOU HAVE BEEN MOVING AROUND A LOT MORE THAN USUAL: NOT AT ALL
7. TROUBLE CONCENTRATING ON THINGS, SUCH AS READING THE NEWSPAPER OR WATCHING TELEVISION: NOT AT ALL
6. FEELING BAD ABOUT YOURSELF - OR THAT YOU ARE A FAILURE OR HAVE LET YOURSELF OR YOUR FAMILY DOWN: SEVERAL DAYS
SUM OF ALL RESPONSES TO PHQ9 QUESTIONS 1 AND 2: 2
2. FEELING DOWN, DEPRESSED, IRRITABLE, OR HOPELESS: SEVERAL DAYS
1. LITTLE INTEREST OR PLEASURE IN DOING THINGS: NOT AT ALL
SUM OF ALL RESPONSES TO PHQ QUESTIONS 1-9: 4
3. TROUBLE FALLING OR STAYING ASLEEP OR SLEEPING TOO MUCH: NOT AT ALL
SUM OF ALL RESPONSES TO PHQ9 QUESTIONS 1 AND 2: 2
8. MOVING OR SPEAKING SO SLOWLY THAT OTHER PEOPLE COULD HAVE NOTICED. OR THE OPPOSITE, BEING SO FIGETY OR RESTLESS THAT YOU HAVE BEEN MOVING AROUND A LOT MORE THAN USUAL: NOT AT ALL
SUM OF ALL RESPONSES TO PHQ9 QUESTIONS 1 AND 2: 0
1. LITTLE INTEREST OR PLEASURE IN DOING THINGS: SEVERAL DAYS
4. FEELING TIRED OR HAVING LITTLE ENERGY: SEVERAL DAYS
7. TROUBLE CONCENTRATING ON THINGS, SUCH AS READING THE NEWSPAPER OR WATCHING TELEVISION: NOT AT ALL
4. FEELING TIRED OR HAVING LITTLE ENERGY: SEVERAL DAYS
2. FEELING DOWN, DEPRESSED, IRRITABLE, OR HOPELESS: SEVERAL DAYS
SUM OF ALL RESPONSES TO PHQ QUESTIONS 1-9: 4
3. TROUBLE FALLING OR STAYING ASLEEP OR SLEEPING TOO MUCH: NOT AT ALL
6. FEELING BAD ABOUT YOURSELF - OR THAT YOU ARE A FAILURE OR HAVE LET YOURSELF OR YOUR FAMILY DOWN: SEVERAL DAYS
2. FEELING DOWN, DEPRESSED, IRRITABLE, OR HOPELESS: NOT AT ALL
9. THOUGHTS THAT YOU WOULD BE BETTER OFF DEAD, OR OF HURTING YOURSELF: NOT AT ALL
1. LITTLE INTEREST OR PLEASURE IN DOING THINGS: SEVERAL DAYS
5. POOR APPETITE OR OVEREATING: NOT AT ALL
5. POOR APPETITE OR OVEREATING: NOT AT ALL
9. THOUGHTS THAT YOU WOULD BE BETTER OFF DEAD, OR OF HURTING YOURSELF: NOT AT ALL

## 2023-01-01 ASSESSMENT — COGNITIVE AND FUNCTIONAL STATUS - GENERAL
HELP NEEDED FOR BATHING: A LOT
TOILETING: TOTAL
TOILETING: A LOT
DAILY ACTIVITIY SCORE: 14
STANDING UP FROM CHAIR USING ARMS: TOTAL
SUGGESTED CMS G CODE MODIFIER DAILY ACTIVITY: CK
DAILY ACTIVITIY SCORE: 16
MOVING FROM LYING ON BACK TO SITTING ON SIDE OF FLAT BED: UNABLE
TOILETING: A LOT
DAILY ACTIVITIY SCORE: 10
TURNING FROM BACK TO SIDE WHILE IN FLAT BAD: A LITTLE
DRESSING REGULAR UPPER BODY CLOTHING: A LOT
MOBILITY SCORE: 12
EATING MEALS: A LITTLE
SUGGESTED CMS G CODE MODIFIER MOBILITY: CN
WALKING IN HOSPITAL ROOM: TOTAL
CLIMB 3 TO 5 STEPS WITH RAILING: TOTAL
DRESSING REGULAR UPPER BODY CLOTHING: A LITTLE
TURNING FROM BACK TO SIDE WHILE IN FLAT BAD: A LOT
TURNING FROM BACK TO SIDE WHILE IN FLAT BAD: UNABLE
HELP NEEDED FOR BATHING: TOTAL
DRESSING REGULAR LOWER BODY CLOTHING: A LOT
SUGGESTED CMS G CODE MODIFIER DAILY ACTIVITY: CK
CLIMB 3 TO 5 STEPS WITH RAILING: TOTAL
MOBILITY SCORE: 6
MOVING TO AND FROM BED TO CHAIR: A LOT
MOVING FROM LYING ON BACK TO SITTING ON SIDE OF FLAT BED: A LOT
DRESSING REGULAR UPPER BODY CLOTHING: A LOT
SUGGESTED CMS G CODE MODIFIER MOBILITY: CL
MOVING FROM LYING ON BACK TO SITTING ON SIDE OF FLAT BED: A LOT
DRESSING REGULAR LOWER BODY CLOTHING: A LOT
SUGGESTED CMS G CODE MODIFIER MOBILITY: CL
SUGGESTED CMS G CODE MODIFIER DAILY ACTIVITY: CL
EATING MEALS: A LOT
STANDING UP FROM CHAIR USING ARMS: A LOT
PERSONAL GROOMING: A LOT
STANDING UP FROM CHAIR USING ARMS: A LOT
WALKING IN HOSPITAL ROOM: A LOT
MOBILITY SCORE: 11
CLIMB 3 TO 5 STEPS WITH RAILING: TOTAL
HELP NEEDED FOR BATHING: A LOT
PERSONAL GROOMING: A LITTLE
PERSONAL GROOMING: A LITTLE
MOVING TO AND FROM BED TO CHAIR: UNABLE
WALKING IN HOSPITAL ROOM: A LOT
DRESSING REGULAR LOWER BODY CLOTHING: A LOT
MOVING TO AND FROM BED TO CHAIR: A LOT

## 2023-01-01 ASSESSMENT — PAIN DESCRIPTION - PAIN TYPE
TYPE: ACUTE PAIN

## 2023-01-01 ASSESSMENT — GAIT ASSESSMENTS: GAIT LEVEL OF ASSIST: UNABLE TO PARTICIPATE

## 2023-01-01 ASSESSMENT — FIBROSIS 4 INDEX
FIB4 SCORE: 4.22
FIB4 SCORE: 0.95
FIB4 SCORE: 0.97
FIB4 SCORE: 2.01
FIB4 SCORE: 1.69
FIB4 SCORE: 0.95

## 2023-01-01 ASSESSMENT — PULMONARY FUNCTION TESTS
EPAP_CMH2O: 8

## 2023-01-01 ASSESSMENT — LIFESTYLE VARIABLES
TOTAL SCORE: 0
ON A TYPICAL DAY WHEN YOU DRINK ALCOHOL HOW MANY DRINKS DO YOU HAVE: 0
HAVE PEOPLE ANNOYED YOU BY CRITICIZING YOUR DRINKING: NO
EVER HAD A DRINK FIRST THING IN THE MORNING TO STEADY YOUR NERVES TO GET RID OF A HANGOVER: NO
HOW MANY TIMES IN THE PAST YEAR HAVE YOU HAD 5 OR MORE DRINKS IN A DAY: 0
CONSUMPTION TOTAL: NEGATIVE
TOTAL SCORE: 0
ALCOHOL_USE: NO
EVER FELT BAD OR GUILTY ABOUT YOUR DRINKING: NO
AVERAGE NUMBER OF DAYS PER WEEK YOU HAVE A DRINK CONTAINING ALCOHOL: 0
HAVE YOU EVER FELT YOU SHOULD CUT DOWN ON YOUR DRINKING: NO
TOTAL SCORE: 0

## 2023-01-01 ASSESSMENT — COPD QUESTIONNAIRES
DURING THE PAST 4 WEEKS HOW MUCH DID YOU FEEL SHORT OF BREATH: SOME OF THE TIME
HAVE YOU SMOKED AT LEAST 100 CIGARETTES IN YOUR ENTIRE LIFE: YES
COPD SCREENING SCORE: 6
DO YOU EVER COUGH UP ANY MUCUS OR PHLEGM?: YES, A FEW DAYS A WEEK OR MONTH

## 2023-01-01 ASSESSMENT — ACTIVITIES OF DAILY LIVING (ADL): TOILETING: INDEPENDENT

## 2023-08-03 PROBLEM — D72.829 LEUKOCYTOSIS: Status: ACTIVE | Noted: 2023-01-01

## 2023-08-03 PROBLEM — K21.9 GASTROESOPHAGEAL REFLUX DISEASE WITHOUT ESOPHAGITIS: Status: ACTIVE | Noted: 2023-01-01

## 2023-08-03 PROBLEM — F41.9 ANXIETY: Status: ACTIVE | Noted: 2023-01-01

## 2023-08-03 PROBLEM — N18.31 STAGE 3A CHRONIC KIDNEY DISEASE: Status: ACTIVE | Noted: 2023-01-01

## 2023-08-03 PROBLEM — K80.20 SYMPTOMATIC CHOLELITHIASIS: Status: ACTIVE | Noted: 2023-01-01

## 2023-08-03 PROBLEM — E11.29 TYPE 2 DIABETES MELLITUS WITH KIDNEY COMPLICATION, WITHOUT LONG-TERM CURRENT USE OF INSULIN (HCC): Status: ACTIVE | Noted: 2023-01-01

## 2023-08-03 PROBLEM — K85.10 ACUTE BILIARY PANCREATITIS WITHOUT INFECTION OR NECROSIS: Status: ACTIVE | Noted: 2023-01-01

## 2023-08-04 PROBLEM — Z86.73 HISTORY OF CVA (CEREBROVASCULAR ACCIDENT): Status: ACTIVE | Noted: 2023-01-01

## 2023-08-04 NOTE — ED PROVIDER NOTES
ED Provider Note    CHIEF COMPLAINT  Chief Complaint   Patient presents with    Abdominal Pain       EXTERNAL RECORDS REVIEWED  Outpatient Notes patient seen at family medicine clinic in July 2023 for foot care    HPI/ROS  LIMITATION TO HISTORY   Select: : None  OUTSIDE HISTORIAN(S):  Significant other - wife    Kobe Spear is a 63 y.o. male with history of DM2, HLD, HTN, atopic dermatitis, MDD, stroke in 2013 resulting in right sided deficits and aphasia, gallstones who presents with vomiting.  The patient is from [facility] and traveled to [place] on Tuesday via car.  He notes for the past 2 days he has had intermittent epigastric pain, but the pain has been manageable.  He had been doing well until this morning he told his wife that he is feeling constipated.  He wanted to be near a bathroom here for his family went to [facility] to sight see and he stayed at home.  His wife returned back to home 5 hours later and found that he had 5 episodes of vomiting.  He notes that the pain around his bellybutton got much worse approximately 3 hours ago.  There is no blood that she noted in his vomit.  He was also diaphoretic and sweaty.  He states that he had a bowel movement.  He states that the pain was 10 out of 10 therefore they called EMS.  EMS gave him 100 mcg of fentanyl.  The patient states he had hernia repair several years ago but otherwise has no abdominal surgeries.  He has had no fever, chills, cough, chest pain, new numbness, tingling, weakness.    PAST MEDICAL HISTORY   He has past medical history of pure hypercholesterolemia, left bundle branch block, hemorrhoids, hypertension, atopic dermatitis, generalized anxiety disorder, hypogonadism, cataract, MDD, anemia, GERD, stroke, gallstones, DM2    SURGICAL HISTORY  patient denies any surgical history    FAMILY HISTORY  History reviewed. No pertinent family history.    SOCIAL HISTORY  Social History     Tobacco Use    Smoking status: Never     "Smokeless tobacco: Never   Vaping Use    Vaping Use: Never used   Substance and Sexual Activity    Alcohol use: Never    Drug use: Never    Sexual activity: Not on file       CURRENT MEDICATIONS  Home Medications       Reviewed by Grecia Reyes R.N. (Registered Nurse) on 08/03/23 at 1833  Med List Status: Partial     Medication Last Dose Status   aspirin (ASA) 325 MG Tab 8/3/2023 Active   atorvastatin (LIPITOR) 20 MG Tab 8/2/2023 Active   baclofen (LIORESAL) 10 MG Tab 8/3/2023 Active   clopidogrel (PLAVIX) 75 MG Tab 8/3/2023 Active   docusate sodium (COLACE) 100 MG Cap 8/3/2023 Active   FLUoxetine (PROZAC) 20 MG Cap 8/3/2023 Active   gabapentin (NEURONTIN) 100 MG Cap 8/3/2023 Active   gabapentin (NEURONTIN) 300 MG Cap 8/2/2023 Active   lisinopril (PRINIVIL) 5 MG Tab 8/3/2023 Active   metoprolol tartrate (LOPRESSOR) 25 MG Tab 8/3/2023 Active   pantoprazole (PROTONIX) 40 MG Tablet Delayed Response 8/3/2023 Active                    ALLERGIES  No Known Allergies    PHYSICAL EXAM  VITAL SIGNS: /65   Pulse 68   Temp 35.9 °C (96.7 °F) (Temporal)   Resp 18   Ht 1.753 m (5' 9\")   Wt 113 kg (250 lb)   SpO2 98%   BMI 36.92 kg/m²      Constitutional: Well developed, Well nourished, in moderate distress 2/2 pain, Non-toxic appearance.   HEENT: Normocephalic, Atraumatic,  external ears normal, pharynx pink,  Mucous  Membranes dry, No rhinorrhea or mucosal edema  Eyes: PERRL, EOMI, Conjunctiva normal, No discharge.   Neck: Normal range of motion, No tenderness, Supple, No stridor.   Cardiovascular: Regular Rate and Rhythm, No murmurs,  rubs, or gallops.   Thorax & Lungs: Lungs clear to auscultation bilaterally, No respiratory distress, No wheezes, rhales or rhonchi, No chest wall tenderness.   Abdomen: Tenderness to periumbilical area as well as epigastric area.  Mild right upper quadrant tenderness to palpation, negative Byrd sign.  No lower quadrant tenderness to palpation.  Abdomen is soft and nondistended, no " guarding  Skin: Warm, Dry, No erythema, No rash  Back:  No CVA tenderness,  No spinal tenderness, bony crepitance step offs or instability.   Extremities: Equal, intact distal pulses, No cyanosis, clubbing or edema,  No tenderness.   Musculoskeletal: Good range of motion in all major joints. No tenderness to palpation or major deformities noted.   Neurologic: Alert & oriented x 3, aphasia noted, at baseline per wife.  4 out of 5 strength to right upper and lower extremities, baseline per patient and wife.  Strength 5 out of 5 to left upper and lower extremities.  Sensation intact to light touch.  Psychiatric: Affect normal, Judgment normal, Mood normal. No suicidal or homicidal ideation      DIAGNOSTIC STUDIES / PROCEDURES    LABS  Results for orders placed or performed during the hospital encounter of 08/03/23   CBC WITH DIFFERENTIAL   Result Value Ref Range    WBC 17.9 (H) 4.8 - 10.8 K/uL    RBC 5.51 4.70 - 6.10 M/uL    Hemoglobin 15.2 14.0 - 18.0 g/dL    Hematocrit 46.6 42.0 - 52.0 %    MCV 84.6 81.4 - 97.8 fL    MCH 27.6 27.0 - 33.0 pg    MCHC 32.6 32.3 - 36.5 g/dL    RDW 43.9 35.9 - 50.0 fL    Platelet Count 320 164 - 446 K/uL    MPV 10.3 9.0 - 12.9 fL    Neutrophils-Polys 80.30 (H) 44.00 - 72.00 %    Lymphocytes 12.30 (L) 22.00 - 41.00 %    Monocytes 6.10 0.00 - 13.40 %    Eosinophils 0.60 0.00 - 6.90 %    Basophils 0.20 0.00 - 1.80 %    Immature Granulocytes 0.50 0.00 - 0.90 %    Nucleated RBC 0.00 0.00 - 0.20 /100 WBC    Neutrophils (Absolute) 14.41 (H) 1.82 - 7.42 K/uL    Lymphs (Absolute) 2.20 1.00 - 4.80 K/uL    Monos (Absolute) 1.10 (H) 0.00 - 0.85 K/uL    Eos (Absolute) 0.10 0.00 - 0.51 K/uL    Baso (Absolute) 0.04 0.00 - 0.12 K/uL    Immature Granulocytes (abs) 0.09 0.00 - 0.11 K/uL    NRBC (Absolute) 0.00 K/uL   COMP METABOLIC PANEL   Result Value Ref Range    Sodium 139 135 - 145 mmol/L    Potassium 3.9 3.6 - 5.5 mmol/L    Chloride 101 96 - 112 mmol/L    Co2 22 20 - 33 mmol/L    Anion Gap 16.0 7.0 -  16.0    Glucose 218 (H) 65 - 99 mg/dL    Bun 15 8 - 22 mg/dL    Creatinine 1.12 0.50 - 1.40 mg/dL    Calcium 9.2 8.4 - 10.2 mg/dL    Correct Calcium 9.0 8.5 - 10.5 mg/dL    AST(SGOT) 306 (H) 12 - 45 U/L    ALT(SGPT) 217 (H) 2 - 50 U/L    Alkaline Phosphatase 145 (H) 30 - 99 U/L    Total Bilirubin 1.3 0.1 - 1.5 mg/dL    Albumin 4.3 3.2 - 4.9 g/dL    Total Protein 7.8 6.0 - 8.2 g/dL    Globulin 3.5 1.9 - 3.5 g/dL    A-G Ratio 1.2 g/dL   LIPASE   Result Value Ref Range    Lipase >3000 (H) 11 - 82 U/L   TROPONIN   Result Value Ref Range    Troponin T 16 6 - 19 ng/L   ESTIMATED GFR   Result Value Ref Range    GFR (CKD-EPI) 73 >60 mL/min/1.73 m 2   EKG (Now)   Result Value Ref Range    Report       [facility] Emergency Dept.    Test Date:  2023  Pt Name:    RHINA LOPEZ                Department: [facility]  MRN:        [id number]                      [facility]  Gender:     Male                         Technician: [id number]  :        1960                   Requested By:JANE HENAO  Order #:    [id number]                    Reading MD:    Measurements  Intervals                                Axis  Rate:       68                           P:          52  TN:         166                          QRS:        -67  QRSD:       104                          T:          37  QT:         424  QTc:        451    Interpretive Statements  Sinus rhythm  Left anterior fascicular block  Abnormal R-wave progression, late transition  Left ventricular hypertrophy  No previous ECG available for comparison       I have independently interpreted this EKG    RADIOLOGY  I have independently interpreted the diagnostic imaging associated with this visit and am waiting the final reading from the radiologist.   My preliminary interpretation is as follows: gallstones present  Radiologist interpretation:   US-RUQ   Final Result      1.  Hepatic steatosis.      2.  Cholelithiasis.      CT-ABDOMEN-PELVIS  WITH   Final Result         1.  Cholelithiasis.      2.  Marked acute pancreatitis      DX-CHEST-PORTABLE (1 VIEW)   Final Result      Hypoinflation with borderline cardiomegaly. No lobar consolidation or large pleural effusions.               COURSE & MEDICAL DECISION MAKING    ED Observation Status? No; Patient does not meet criteria for ED Observation.     8:14 PM patient reevaluated.  He is resting comfortably.  States pain is improved with fentanyl.  I discussed results with the patient and his wife that shows that he has gallstone pancreatitis.  There is no evidence of cholecystitis therefore we have held off on antibiotics at this time.  Discussed that we will talk to surgery to determine final disposition.  Wife notes that she has discussed with [facility] who states that patient is able to stay at this hospital and does not need to be transferred out.  They are agreeable to plan no further questions.    8:24 PM Discussed with surgery, Dr. Cordova, who states admit to medicine and surgery can be consulted at further time for cholecystectomy    INITIAL ASSESSMENT, COURSE AND PLAN  Care Narrative: This is a 63-year-old male with history of previous CVA who is visiting here from [facility] who presents with epigastric abdominal pain and vomiting.  On arrival, his vital signs are stable.  Patient is in moderate distress secondary to pain and is actively trying to vomit.  He is not obtunded.    An IV was already established by EMS.  Therefore a dose of Zofran was given as well as additional fentanyl.  This resulted in improvement in pain.  The patient was made n.p.o. on arrival, labs show leukocytosis to 17,900.  Patient is afebrile.  His hemoglobin is normal.  His platelet count is normal.  ACS was considered however EKG shows no acute ischemic changes, and troponin is 16.  Lipase is over 3000 and there is elevation in AST, ALT and alkaline phosphatase.  His bilirubin is within normal limits.  CT scan of the  abdomen was obtained and does show cholelithiasis without secondary signs of cholecystitis.  I did also obtain a right upper quadrant ultrasound to better evaluate the CBD which shows common bile duct is within normal limits.  Right upper quadrant ultrasound also does not show any signs of cholecystitis including no thickening of gallbladder wall, no pericholecystic fluid.  He does however have several gallstones.    I did discuss with general surgery as above, Dr. Cordova, who states that the patient should be admitted to hospital medicine service.  He states that surgery can be consulted at a later date for cholecystectomy.  I discussed with the hospitalist, Dr. Morales, who agreed to admit the patient to the hospital. Patient and his wife are agreeable to plan with no further questions.     HYDRATION: Based on the patient's presentation of Acute Vomiting the patient was given IV fluids. IV Hydration was used because oral hydration was not adequate alone. Upon recheck following hydration, the patient was improved with mucous membranes less dry.      ADDITIONAL PROBLEM LIST  History of CVA with baseline aphasia and right sided deficits     DISPOSITION AND DISCUSSIONS  I have discussed management of the patient with the following physicians and LINDA's:    General surgery   Hospitalist    Patient admitted to hospitalist, Dr. Munoz, in guarded condition.  Patient and wife agreeable to admission plan with no further questions.    FINAL DIAGNOSIS  Gallstone pancreatitis  Elevation in LFTs  Hyperglycemia       Electronically signed by: Rose Muro M.D., 8/3/2023 6:41 PM

## 2023-08-04 NOTE — ASSESSMENT & PLAN NOTE
Leukocytosis continues to trend down, after thorough chart review antibiotics were broadened due to worsening condition.    Patient is on day 10 of Zosyn and day 5 of linezolid will continue until surgery tomorrow and can likely discontinue afterwards.  CT chest abdomen pelvis just shows the known gallstones, dilated gallbladder and pancreatitis with peripancreatic fluid, no evidence of abscess or other etiology to explain fevers, leukocytosis  Chest x-ray shows stable edema, improving overall  UA not consistent with infection  Blood cultures collected 8/11 and currently showing no growth

## 2023-08-04 NOTE — PROGRESS NOTES
Tita from Lab called with critical result of lactic acid at 4.3. Critical lab result read back to Tita.     Dr. Kyle notified of critical lab result at 0302.  Critical lab result read back by Dr. Kyle. New orders received.

## 2023-08-04 NOTE — ASSESSMENT & PLAN NOTE
Residual right arm hemiparesis  Aspirin/Plavix to remain on hold as he will need upcoming surgery and this would delay it further.  Please caution with mobilizing  PT OT to continue  MRI brain shows a right basal ganglia lacunar infarct, no hemorrhage and left supratentorial encephalomalacia likely related to his previous strokes.    Acutely worsening mental status  Stat CT head, CTA head and neck: Interval worsening of infarct  Transfer to ICU

## 2023-08-04 NOTE — ED TRIAGE NOTES
Kobe Spear  63 y.o.  Chief Complaint   Patient presents with    Abdominal Pain     Pt BIB [facility].  Pt has been constipated x 4 days.  Pt had a BM today.  Pt has been experiencing abdominal pain x 2 hours.  Pt given Fentanyl 100 mcg PTA.  Wife now @ BS.

## 2023-08-04 NOTE — ASSESSMENT & PLAN NOTE
Repeat CT chest abdomen pelvis shows no evidence of abscess, he still has significant pancreatitis and peripancreatic fluid but his gallbladder with palpation is no longer tender.  General surgery was consulted and does not feel that the fevers and the confusion, impulsivity and WBC count is related to an acute cholecystitis.  Per surgery cholecystectomy scheduled on 8/16-canceled due to patient's altered mental status

## 2023-08-04 NOTE — ED NOTES
Medication history updated per wife and review of current medication list. No recent antibiotics or OTC medication use reported.    Roberto Lim, PharmD, BCPS

## 2023-08-04 NOTE — ASSESSMENT & PLAN NOTE
Continue fluoxetine when no longer NPO  The acute withdrawal of this medication could be contributing to some of his confusion.

## 2023-08-04 NOTE — CARE PLAN
The patient is Stable - Low risk of patient condition declining or worsening    Shift Goals  Clinical Goals: comfort from pain after intervention  Patient Goals: pain control    Progress made toward(s) clinical / shift goals:  patient's pain continues to be in the 7s, relief when medicated on first intervention.    Patient is not progressing towards the following goals: spO2 continues to fluctuate from high 80s to low 90s, especially when pain is ongoing.      Problem: Respiratory  Goal: Patient will achieve/maintain optimum respiratory ventilation and gas exchange  Outcome: Not Met

## 2023-08-04 NOTE — PROGRESS NOTES
Rounding  2207= patient arrived from er, vitals taken, assisted patient with his urinal  2300= patient asked me to turn off all the lights in his room  0100= patient resting with pulse ox on  0300= patient sleeping  0433= vitals taken

## 2023-08-04 NOTE — ED NOTES
Pt arrives diaphortic and pale  Hx of multiple strokes with R sided deficts  Pt and wife visiting from [facility], visiting pts brother in law

## 2023-08-04 NOTE — ASSESSMENT & PLAN NOTE
Avoid/minimize nephrotoxins as much as possible, renally dose medications, monitor inputs and outputs   Daily labs

## 2023-08-04 NOTE — HOSPITAL COURSE
History of CVA, CKD stage III, DM2, GERD who is traveling through [place] and developed sudden onset severe abdominal pain and presented to the ER where he was found to have markedly elevated LFTs and lipase highly suggestive of gallstone pancreatitis.  Right upper quadrant ultrasound revealed cholelithiasis however no CBD dilation.  General surgery was consulted and recommended MRCP.  He was started on empiric IV antibiotic therapy due to marked leukocytosis .  MRCP showed no evidence of choledocholithiasis only cholelithiasis.  And confirmed acute pancreatitis.  Patient became markedly agitated and was upgraded to the ICU so Precedex could be used.  He was in the ICU 8/5 through 8/10.  He also had respiratory failure secondary to the agitation and required BiPAP high flow nasal cannula.  He has been weaned from this and is now down to 4 L nasal cannula and saturating well.  He remains very anxious and constantly moving and thus wife is at bedside.

## 2023-08-04 NOTE — PROGRESS NOTES
4 Eyes Skin Assessment Completed by Jamaal, RN and Mary, RN.    Head WDL  Ears WDL  Nose WDL  Mouth WDL  Neck WDL  Breast/Chest WDL  Shoulder Blades Redness  Spine WDL  (R) Arm/Elbow/Hand Scab  (L) Arm/Elbow/Hand Scab  Abdomen WDL  Groin WDL  Scrotum/Coccyx/Buttocks WDL  (R) Leg Scab  (L) Leg Scab  (R) Heel/Foot/Toe WDL  (L) Heel/Foot/Toe WDL          Devices In Places Pulse Ox and Nasal Cannula      Interventions In Place Waffle Overlay and Pressure Redistribution Mattress    Possible Skin Injury No    Pictures Uploaded Into Epic N/A  Wound Consult Placed N/A  RN Wound Prevention Protocol Ordered No

## 2023-08-04 NOTE — CARE PLAN
The patient is Stable - Low risk of patient condition declining or worsening    Shift Goals  Clinical Goals: Patient will report pain improvment by 1 point after interventions  Patient Goals: Pain control, rest    Progress made toward(s) clinical / shift goals:  Patient reports pain improvement after interventions this shift.     Patient is not progressing towards the following goals: N/A

## 2023-08-04 NOTE — ED NOTES
Pt resting on gurney, pt in no acute distress, pt provided call light, instructed to call if needing any assistance, instructed not to get up by self, cornelia in lowest position. Wife at bedside

## 2023-08-04 NOTE — ASSESSMENT & PLAN NOTE
Does have some distention, but no abdominal pain  Last lipase 67, radiographic findings on CT still showing significant pancreatitis with peripancreatic fluid, no evidence of necrosis or pseudocyst  General surgery consulted, follow recommendations  Per surgery cholecystectomy scheduled on 8/16-consulted to patient's altered mental status

## 2023-08-04 NOTE — PROGRESS NOTES
Hospital Medicine Daily Progress Note    Date of Service  8/4/2023    Chief Complaint  Kobe Spear is a 63 y.o. male admitted 8/3/2023 with abdominal pain    Hospital Course  History of CVA, CKD stage III, DM2, GERD who is traveling through [place] and developed sudden onset severe abdominal pain and presented to the ER where he was found to have markedly elevated LFTs and lipase highly suggestive of gallstone pancreatitis.  Right upper quadrant ultrasound revealed cholecystitis however no CBD dilation.  General surgery was consulted and recommended MRCP.  He was started on empiric IV antibiotic therapy due to marked leukocytosis    Interval Problem Update  8/4: WBC slightly increased, still severe pain.  MRCP completed and reveals no evidence of CBD stone.  Still with lactic acidosis, continue IV fluids.  Bilirubin rising    I have discussed this patient's plan of care and discharge plan at IDT rounds today with Case Management, Nursing, Nursing leadership, and other members of the IDT team.    Consultants/Specialty  general surgery    Code Status  Full Code    Disposition  The patient is not medically cleared for discharge to home or a post-acute facility.  Anticipate discharge to: home with close outpatient follow-up    I have placed the appropriate orders for post-discharge needs.    Review of Systems  Review of Systems   Constitutional:  Positive for diaphoresis and malaise/fatigue. Negative for chills and fever.   HENT:  Negative for sore throat.    Respiratory:  Negative for cough and shortness of breath.    Cardiovascular:  Negative for chest pain and palpitations.   Gastrointestinal:  Positive for abdominal pain and nausea. Negative for blood in stool, diarrhea, heartburn and vomiting.   Genitourinary:  Negative for dysuria and frequency.   Musculoskeletal:  Negative for back pain and myalgias.   Neurological:  Positive for focal weakness and weakness. Negative for dizziness and headaches.    Psychiatric/Behavioral:  Negative for depression and hallucinations.    All other systems reviewed and are negative.       Physical Exam  Temp:  [35.9 °C (96.7 °F)-37 °C (98.6 °F)] 36.8 °C (98.2 °F)  Pulse:  [67-96] 82  Resp:  [15-18] 18  BP: (135-167)/(65-95) 146/81  SpO2:  [86 %-98 %] 94 %    Physical Exam  Constitutional:       General: He is in acute distress.      Appearance: He is ill-appearing and diaphoretic.   HENT:      Head: Normocephalic and atraumatic.      Nose: Nose normal.      Mouth/Throat:      Mouth: Mucous membranes are moist.   Eyes:      Extraocular Movements: Extraocular movements intact.      Pupils: Pupils are equal, round, and reactive to light.   Cardiovascular:      Rate and Rhythm: Regular rhythm. Tachycardia present.      Heart sounds: No murmur heard.  Pulmonary:      Effort: Pulmonary effort is normal. No respiratory distress.      Breath sounds: Normal breath sounds. No stridor.      Comments: Reduced breath sounds bilateral bases  Abdominal:      General: There is no distension.      Palpations: Abdomen is soft.      Tenderness: There is abdominal tenderness. There is guarding.   Musculoskeletal:         General: No swelling, tenderness or deformity.      Cervical back: Normal range of motion and neck supple.   Skin:     General: Skin is warm.      Coloration: Skin is pale.   Neurological:      General: No focal deficit present.      Mental Status: He is alert and oriented to person, place, and time. Mental status is at baseline.   Psychiatric:         Mood and Affect: Mood normal.         Behavior: Behavior normal.         Thought Content: Thought content normal.         Fluids    Intake/Output Summary (Last 24 hours) at 8/4/2023 1334  Last data filed at 8/4/2023 0857  Gross per 24 hour   Intake 2373.57 ml   Output 750 ml   Net 1623.57 ml       Laboratory  Recent Labs     08/03/23  1812 08/04/23  0135   WBC 17.9* 18.9*   RBC 5.51 5.56   HEMOGLOBIN 15.2 15.4   HEMATOCRIT 46.6 48.2    MCV 84.6 86.7   MCH 27.6 27.7   MCHC 32.6 32.0*   RDW 43.9 45.2   PLATELETCT 320 257   MPV 10.3 10.1     Recent Labs     08/03/23  1812 08/04/23  0135   SODIUM 139 139   POTASSIUM 3.9 4.2   CHLORIDE 101 103   CO2 22 20   GLUCOSE 218* 200*   BUN 15 18   CREATININE 1.12 0.97   CALCIUM 9.2 8.8                   Imaging  [id number]   Final Result         1. No choledocholithiasis. No CBD dilatation.      2. Cholelithiasis.      3. Fluid surrounding the pancreas, in keeping with acute pancreatitis.      US-RUQ   Final Result      1.  Hepatic steatosis.      2.  Cholelithiasis.      CT-ABDOMEN-PELVIS WITH   Final Result         1.  Cholelithiasis.      2.  Marked acute pancreatitis      DX-CHEST-PORTABLE (1 VIEW)   Final Result      Hypoinflation with borderline cardiomegaly. No lobar consolidation or large pleural effusions.              Assessment/Plan  * Acute biliary pancreatitis without infection or necrosis- (present on admission)  Assessment & Plan  Lipase is more than 3000  Likely related to gallstones  MRCP negative for CBD stone, likely recently passed  Supportive care, multimodal pain control.  Bowel rest/n.p.o.  Still with severe abdominal pain, continue IV Dilaudid as needed  IV fluids    History of CVA (cerebrovascular accident)  Assessment & Plan  Residual right arm hemiparesis  Once he is taking p.o., resume statin, aspirin  Please caution with mobilizing    Stage 3a chronic kidney disease (HCC)- (present on admission)  Assessment & Plan  Avoid/minimize nephrotoxins as much as possible, renally dose medications, monitor inputs and outputs     Type 2 diabetes mellitus with kidney complication, without long-term current use of insulin (HCC)- (present on admission)  Assessment & Plan  With hyperglycemia  Last glycated hemoglobin was 6.2%  I will start short acting insulin for now  I will order Accu-Checks, hypoglycemia protocol  Adjust according to blood sugars trend     Leukocytosis- (present on  admission)  Assessment & Plan  Likely secondary to severe pancreatitis  He also has bilateral infiltrates on CT however this may be inflammatory related breath pancreatitis  Check procalcitonin  Cholecystitis, pancreatic necrosis, & cholangitis are also possible.   Continue empiric ceftriaxone and metronidazole, follow on procalcitonin, cultures and sensitivities    Gastroesophageal reflux disease without esophagitis- (present on admission)  Assessment & Plan  Resume omeprazole    Anxiety- (present on admission)  Assessment & Plan  Resume fluoxetine    Symptomatic cholelithiasis- (present on admission)  Assessment & Plan  Ultrasound showed evidence for cholelithiasis without evidence of common bile dilation  Clinical picture suggest likely recent passage of a gallstone resulting in acute pancreatitis  MRCP negative for CBD stone but shows significant pancreatitis  EDP discussed with general surgery Dr Cordova - she will require cholecystectomy during this hospital stay however after acute pancreatitis resolves         VTE prophylaxis: Heparin    I have performed a physical exam and reviewed and updated ROS and Plan today (8/4/2023). In review of yesterday's note (8/3/2023), there are no changes except as documented above.    Total time:  55 minutes.  I spent greater than 50% of the time for patient care, counseling, and coordination on this date, including unit/floor time, and face-to-face time with the patient as per interval events and assessment and plan above

## 2023-08-04 NOTE — H&P
Hospital Medicine History & Physical Note    Date of Service  8/3/2023    Primary Care Physician  Pcp Pt States None     Consultants  General surgery Dr Cordova      Code Status  Full Code    Chief Complaint  Chief Complaint   Patient presents with    Abdominal Pain     History of Presenting Illness  Kobe Spear is a 63 y.o. male with a past medical history of primary hypertension, diabetes, hyperlipidemia and history of cerebrovascular accident with residual right sided weakness who is visiting from [place] and presented 8/3/2023 with severe abdominal pain.  Patient reports that he has not been feeling well over the past 2 days.  He has been having intermittent episodes of abdominal pain.  The pain is mostly in the epigastric region.  Pain got much worse today.  Radiates 10/10.  The pain does radiate to the back.  Pain is worse after food.  No relieving factors.      I discussed the plan of care with emergency department physician, the patient and patient wife present at bedside in the emergency room    Review of Systems  Review of Systems   Constitutional:  Positive for malaise/fatigue. Negative for chills and fever.   Eyes:  Negative for discharge and redness.   Respiratory:  Negative for cough, shortness of breath and stridor.    Cardiovascular:  Negative for chest pain and leg swelling.   Gastrointestinal:  Positive for abdominal pain and nausea.   Genitourinary:  Negative for flank pain.   Musculoskeletal:  Negative for myalgias.   Skin: Negative.    Neurological:  Negative for focal weakness.   Endo/Heme/Allergies:  Does not bruise/bleed easily.   Psychiatric/Behavioral:  The patient is not nervous/anxious.      Past Medical History   has no past medical history on file.    Surgical History   has no past surgical history on file.     Family History  family history is not on file.     Social History   reports that he has never smoked. He has never used smokeless tobacco. He reports that he does  not drink alcohol and does not use drugs.    Allergies  No Known Allergies    Medications  Prior to Admission Medications   Prescriptions Last Dose Informant Patient Reported? Taking?   FLUoxetine (PROZAC) 20 MG Cap 8/3/2023  Yes Yes   Sig: Take 20 mg by mouth every day.   aspirin (ASA) 325 MG Tab 8/3/2023  Yes Yes   Sig: Take 325 mg by mouth every day.   atorvastatin (LIPITOR) 20 MG Tab 8/2/2023  Yes Yes   Sig: Take 20 mg by mouth every evening.   baclofen (LIORESAL) 10 MG Tab 8/3/2023  Yes Yes   Sig: Take 10 mg by mouth 3 times a day.   clopidogrel (PLAVIX) 75 MG Tab 8/3/2023  Yes Yes   Sig: Take 75 mg by mouth every day.   docusate sodium (COLACE) 100 MG Cap 8/3/2023  Yes Yes   Sig: Take 100 mg by mouth 2 times a day.   gabapentin (NEURONTIN) 100 MG Cap 8/3/2023  Yes Yes   Sig: Take 200 mg by mouth 2 times a day.   gabapentin (NEURONTIN) 300 MG Cap 8/2/2023  Yes Yes   Sig: Take 300 mg by mouth every evening.   lisinopril (PRINIVIL) 5 MG Tab 8/3/2023  Yes Yes   Sig: Take 5 mg by mouth every day.   metoprolol tartrate (LOPRESSOR) 25 MG Tab 8/3/2023  Yes Yes   Sig: Take 25 mg by mouth 2 times a day.   pantoprazole (PROTONIX) 40 MG Tablet Delayed Response 8/3/2023  Yes Yes   Sig: Take 40 mg by mouth every day.      Facility-Administered Medications: None     Physical Exam  Temp:  [35.9 °C (96.7 °F)] 35.9 °C (96.7 °F)  Pulse:  [68-72] 68  Resp:  [18] 18  BP: (135)/(65) 135/65  SpO2:  [86 %-98 %] 98 %  Blood Pressure : 135/65   Temperature: 35.9 °C (96.7 °F)   Pulse: 68   Respiration: 18   Pulse Oximetry: 98 %     Physical Exam  Constitutional:       General: He is not in acute distress.     Appearance: He is not ill-appearing or diaphoretic.   HENT:      Head: Atraumatic.      Right Ear: External ear normal.      Left Ear: External ear normal.      Nose: No congestion or rhinorrhea.      Mouth/Throat:      Mouth: Mucous membranes are dry.   Eyes:      General: No scleral icterus.        Right eye: No discharge.          Left eye: No discharge.      Pupils: Pupils are equal, round, and reactive to light.   Cardiovascular:      Rate and Rhythm: Normal rate and regular rhythm.   Pulmonary:      Effort: Pulmonary effort is normal.   Abdominal:      General: There is no distension.      Tenderness: There is abdominal tenderness. There is no guarding or rebound.   Musculoskeletal:      Cervical back: Neck supple. No rigidity. No muscular tenderness.      Right lower leg: No edema.      Left lower leg: No edema.   Skin:     General: Skin is dry.      Capillary Refill: Capillary refill takes 2 to 3 seconds.      Coloration: Skin is pale. Skin is not jaundiced.   Neurological:      Mental Status: He is alert and oriented to person, place, and time.      Coordination: Coordination normal.   Psychiatric:         Mood and Affect: Mood normal.         Behavior: Behavior normal.       Laboratory:  Recent Labs     08/03/23  1812   WBC 17.9*   RBC 5.51   HEMOGLOBIN 15.2   HEMATOCRIT 46.6   MCV 84.6   MCH 27.6   MCHC 32.6   RDW 43.9   PLATELETCT 320   MPV 10.3     Recent Labs     08/03/23  1812   SODIUM 139   POTASSIUM 3.9   CHLORIDE 101   CO2 22   GLUCOSE 218*   BUN 15   CREATININE 1.12   CALCIUM 9.2     Recent Labs     08/03/23  1812   ALTSGPT 217*   ASTSGOT 306*   ALKPHOSPHAT 145*   TBILIRUBIN 1.3   LIPASE >3000*   GLUCOSE 218*         No results for input(s): NTPROBNP in the last 72 hours.      Recent Labs     08/03/23  1812   TROPONINT 16     Imaging:  US-RUQ   Final Result      1.  Hepatic steatosis.      2.  Cholelithiasis.      CT-ABDOMEN-PELVIS WITH   Final Result         1.  Cholelithiasis.      2.  Marked acute pancreatitis      DX-CHEST-PORTABLE (1 VIEW)   Final Result      Hypoinflation with borderline cardiomegaly. No lobar consolidation or large pleural effusions.         [id number]-I/O    (Results Pending)     Assessment/Plan:  Justification for Admission Status  I anticipate this patient will require at least two midnights for  appropriate medical management, necessitating inpatient admission because the patient has acute pancreatitis will require n.p.o. status, intravenous fluids, close monitoring for potential complications including compartment syndrome, pleural effusions, pancreatic necrosis and respiratory failure .     Patient will need a Med/Surg bed on EMERGENCY service.  The patient has acute pancreatitis    * Acute biliary pancreatitis without infection or necrosis- (present on admission)  Assessment & Plan  Lipase is more than 3000  Likely related to gallstones  AST, ALT, alkaline phosphatase are elevated making a biliary etiology more likely.  Supportive care, multimodal pain control.  Bowel rest.  N.p.o. for now, consider starting clear liquid diet when pain improves   Watch input and output closely, watch respiratory status closely  Watch closely for potential complications including pleural effusion, hypocalcemia, acute renal failure, compartment syndrome     Symptomatic cholelithiasis- (present on admission)  Assessment & Plan  Ultrasound shows evidence for cholelithiasis without evidence of common bile dilation  Clinical picture suggest likely recent passage of a gallstone resulting in acute pancreatitis  EDP discussed with general surgery Dr Cordova who recommended MRCP, I will order    May require GI consult/ERCP followed by cholecystectomy, versus direct cholecystectomy based on MRCP results and the clinical course of his acute pancreatitis    Stage 3a chronic kidney disease (HCC)- (present on admission)  Assessment & Plan  Avoid/minimize nephrotoxins as much as possible, renally dose medications, monitor inputs and outputs     Type 2 diabetes mellitus with kidney complication, without long-term current use of insulin (HCC)- (present on admission)  Assessment & Plan  With hyperglycemia  Last glycated hemoglobin was 6.2%  I will start short acting insulin for now  I will order Accu-Checks, hypoglycemia protocol  Adjust  according to blood sugars trend     Leukocytosis- (present on admission)  Assessment & Plan  Likely secondary to severe pancreatitis  Cholecystitis, pancreatic necrosis, & cholangitis are also possible, I will check a procalcitonin level.  I will start empiric ceftriaxone and metronidazole, follow on procalcitonin, cultures and sensitivities    Gastroesophageal reflux disease without esophagitis- (present on admission)  Assessment & Plan  Resume omeprazole    Anxiety- (present on admission)  Assessment & Plan  Resume fluoxetine    VTE prophylaxis: SCDs/TEDs and heparin ppx

## 2023-08-04 NOTE — PROGRESS NOTES
0700 introduced myself. Updated board    0900 assisted pt to use the urinal.    1131 pt back on the floor.    1247 assisted pt with urinal    1500 checked on pt. No needs at this time.    1700 pt on cardiac chair. No needs at this time.

## 2023-08-04 NOTE — PROGRESS NOTES
2135 - Received patient report from ED RN via phone. Patient transported to room 123 via slide board. Patient is awake, alert & oriented x4. Patient on 2 L nasal cannula. Patient educated on call light use for needs. Belongings within Safety precautions in place.    Chart check complete.     2158 - Patient's wife, Stephanie, called to update and complete admission profile.     2353 - Patient resting in bed, oxygen saturation 96% on 2 L nasal cannula. Heart rate 87.     0010 - Medication administered per MAR.     0115 - Pain reassessed.    0257 - Patient resting in bed, oxygen saturation 96% on 2 L nasal cannula. Heart rate 93.     0411 - Medication administered per MAR.    0523 - Medication administered per MAR.    0612 - Medication administered per MAR.    0635 - Medication administered per MAR.    0710 - Pain reassessed.

## 2023-08-04 NOTE — ASSESSMENT & PLAN NOTE
Continue omeprazole   [Negative] : Constitutional [Headache] : no headache [Changes in Vision] : no changes in vision [Ear Pain] : no ear pain [Sore Throat] : no sore throat

## 2023-08-04 NOTE — ASSESSMENT & PLAN NOTE
Blood sugars have been elevated, likely due to tube feeds, patient now on regular diet/fastings we will monitor overnight and adjust insulin scale   High insulin sliding scale coverage  If blood sugar remains high will likely need long-acting but will hold for now given n.p.o. status

## 2023-08-04 NOTE — PROGRESS NOTES
4 Eyes Skin Assessment Completed by PETE Briceno and Armida RN.    Head WDL  Ears WDL  Nose WDL  Mouth WDL  Neck WDL  Breast/Chest WDL  Shoulder Blades WDL  Spine WDL  (R) Arm/Elbow/Hand Scab  (L) Arm/Elbow/Hand WDL  Abdomen Bruising  Groin WDL  Scrotum/Coccyx/Buttocks Redness and Blanching  (R) Leg Scab and Discoloration  (L) Leg Scab and Discoloration  (R) Heel/Foot/Toe WDL  (L) Heel/Foot/Toe WDL          Devices In Places Blood Pressure Cuff and Pulse Ox      Interventions In Place Gray Ear Foams, NC W/Ear Foams, and Pillows    Possible Skin Injury No    Pictures Uploaded Into Epic N/A  Wound Consult Placed N/A  RN Wound Prevention Protocol Ordered No

## 2023-08-04 NOTE — PROGRESS NOTES
0771 - Bedside report received from NAVID Valverde RN. Alert and oriented X4, on RA in no acute respiratory distress. Daily plan of care discussed. Patient complains of moderate pain. No other needs at this time. Call light and personal belongings within reach. Hourly rounding in place. Chart reviewed for recent labs, notes, and orders.    0832 - Medicated per MAR. Loud audible expiratory wheezing heard.    0934 - Medicated per MAR. Family at bedside    1027 - Transport to MRI    1139 - patient back from MRI    1215 - Medicated per MAR    1547 - Medicated per MAR    1602 - assisted to chair    1735 - Medicated per MAR

## 2023-08-05 NOTE — PROGRESS NOTES
Attempted to contact pt's spouse Stephanie at this time via phone to update on pt's tx to ICU and current condition. No answer received and name not identified on voicemail.

## 2023-08-05 NOTE — PROGRESS NOTES
4 Eyes Skin Assessment Completed by PETE Middleton and PETE Patterson.    Head WDL  Ears WDL  Nose WDL  Mouth WDL  Neck WDL  Breast/Chest WDL  Shoulder Blades WDL  Spine WDL  (R) Arm/Elbow/Hand Bruising  (L) Arm/Elbow/Hand Bruising and Abrasion  Abdomen Bruising  Groin WDL  Scrotum/Coccyx/Buttocks Redness and Blanching  (R) Leg mottling to knee  (L) Leg mottling to knee  (R) Heel/Foot/Toe WDL  (L) Heel/Foot/Toe WDL          Devices In Places ECG, Blood Pressure Cuff, Pulse Ox, and Bipap      Interventions In Place Bipap Protecta-Gel, TAP System, Q2 Turns, Low Air Loss Mattress, and Heels Loaded W/Pillows    Possible Skin Injury No    Pictures Uploaded Into Epic N/A  Wound Consult Placed N/A  RN Wound Prevention Protocol Ordered No

## 2023-08-05 NOTE — PROGRESS NOTES
Pt with worsening restlessness/agitation at this time; repeatedly removing gown. Pt in notable respiratory distress with respiratory rates in the high-30s to low-40s with audible grunting. Mottling worsening on bilateral lower extremities. Dr. Kyle updated on above via Voalte at 0548 hours; intubation recommended by this RN. Dr. Kyle states she will come to bedside to see pt.

## 2023-08-05 NOTE — PROGRESS NOTES
4 Eyes Skin Assessment Completed by PETE Briceno and PETE Becerra.    Head WDL  Ears WDL  Nose WDL  Mouth WDL  Neck WDL  Breast/Chest WDL  Shoulder Blades WDL  Spine WDL  (R) Arm/Elbow/Hand Scab  (L) Arm/Elbow/Hand WDL  Abdomen Bruising  Groin WDL  Scrotum/Coccyx/Buttocks WDL  (R) Leg Scab and Discoloration  (L) Leg Scab and Discoloration  (R) Heel/Foot/Toe WDL  (L) Heel/Foot/Toe WDL          Devices In Places Blood Pressure Cuff, Pulse Ox, and Nasal Cannula      Interventions In Place Gray Ear Foams, NC W/Ear Foams, Waffle Overlay, and Pillows    Possible Skin Injury No    Pictures Uploaded Into Epic N/A  Wound Consult Placed N/A  RN Wound Prevention Protocol Ordered No

## 2023-08-05 NOTE — RESPIRATORY CARE
Bipap/Cpap mask placed.  Can the patient demonstrate removal of mask? Yes  If no, please notify physician.

## 2023-08-05 NOTE — PROGRESS NOTES
1900 - Received bedside patient report from Ginger, RN. Patient is awake, respirations unlabored, resting in bed. Patient on 2 L nasal cannula. Family at bedside. Patient educated on call light use for needs. Belongings within Safety precautions in place.     Chart check complete.     1930 - Patient up to chair.    2040 - Patient back to bed from chair.     2107 - Medication administered per MAR.    2200 - Patient bladder scanned, 78 ml post void residual.    2227 - Vital signs recorded. Medication administered per MAR.    2333 - Vital signs recorded.    0100 - Vital signs recorded. Observed patient increased oxygen needs. Changed to 6 L oxymask, oxygen saturation 94%.     0013 - Medication administered per MAR.    0048 - Dr. Kyle notified about patient vital signs.     0050 - Emi ICU RN at bedside.    0100 - Vital signs recorded.     0205 - Patient transferred to ICU.

## 2023-08-05 NOTE — PROGRESS NOTES
Pt to ICU room 322 via bed on a cardiac monitor accompanied by this RN and RN Joseph at 0205 hours.

## 2023-08-05 NOTE — CONSULTS
Critical Care Consultation    Date of consult: 8/5/2023    Referring Physician  Yazmin Gastelum M.D.    Reason for Consultation  Acute hypercapnic respiratory failure    History of Presenting Illness  63 y.o. male who presented 8/3/2023 with a history of CVA x 5 (2013), CKD stage III, DM, GERD who is reportedly here visiting family and was admitted with severe abdominal pain.  General surgery was consulted and recommended MRCP which was negative for stones.  He has been managed on GSU for his pain and was started on ceftriaxone/metronidazole 8/4 which was escalated to Vanc and zosyn 8/5 overnight with ICU upgrade.  He is unfortunately unable to provide much history due to his strokes.      WBC count has been escalating. AST/ALT are improving, as are the lipase and lactate.  Troponin increased to 120 from baseline of 16.  This morning patient is complaining of severe pain, grimacing but states that the pain is the same as yesterday and not worsening.  He remains strong in his extremities.  His blood gas has improved significantly overnight but he remains a high risk of worsening respiratory failure and possible intubation.     Code Status  Full Code    Review of Systems  Review of Systems   Unable to perform ROS: Mental status change   Constitutional:  Positive for malaise/fatigue.   Cardiovascular:  Negative for chest pain.   Gastrointestinal:  Positive for abdominal pain.       Past Medical History   has no past medical history on file.    Surgical History   has no past surgical history on file.    Family History  family history is not on file.    Social History   reports that he has never smoked. He has never used smokeless tobacco. He reports that he does not drink alcohol and does not use drugs.    Medications  Home Medications       Reviewed by Hima Lim PharmD (Pharmacist) on 08/03/23 at 2045  Med List Status: Complete     Medication Last Dose Status   ascorbic acid (VITAMIN C) 500 MG tablet 8/2/2023  Active   aspirin (ASA) 325 MG Tab 8/3/2023 Active   atorvastatin (LIPITOR) 20 MG Tab 8/2/2023 Active   baclofen (LIORESAL) 10 MG Tab 8/3/2023 Active   clopidogrel (PLAVIX) 75 MG Tab 8/3/2023 Active   docusate sodium (COLACE) 100 MG Cap 8/3/2023 Active   FLUoxetine (PROZAC) 20 MG Cap 8/3/2023 Active   gabapentin (NEURONTIN) 100 MG Cap 8/3/2023 Active   gabapentin (NEURONTIN) 300 MG Cap 8/2/2023 Active   lisinopril (PRINIVIL) 5 MG Tab 8/3/2023 Active   metoprolol tartrate (LOPRESSOR) 25 MG Tab 8/3/2023 Active   pantoprazole (PROTONIX) 40 MG Tablet Delayed Response 8/3/2023 Active   vitamin e (VITAMIN E) 400 UNIT Cap 8/3/2023 Active                  Current Facility-Administered Medications   Medication Dose Route Frequency Provider Last Rate Last Admin    Respiratory Therapy Consult   Nebulization Continuous RT Inge Norman M.D.        HYDROmorphone (Dilaudid) injection 0.5 mg  0.5 mg Intravenous Q3HRS PRN Inge Norman M.D.   0.5 mg at 08/05/23 0728    HYDROmorphone (Dilaudid) injection 1 mg  1 mg Intravenous Q3HRS PRN Inge Norman M.D.        piperacillin-tazobactam (Zosyn) 3.375 g in  mL IVPB  3.375 g Intravenous Q8HRS Inge Norman M.D. 25 mL/hr at 08/05/23 0550 3.375 g at 08/05/23 0550    MD Alert...Vancomycin per Pharmacy   Other PHARMACY TO DOSE Inge Norman M.D.        vancomycin 2500 mg/500mL NS IVPB premix  25 mg/kg Intravenous Once Inge Norman M.D. 166.7 mL/hr at 08/05/23 0523 2,500 mg at 08/05/23 0523    vancomycin 1250 mg/250mL NS IVPB premix  1,250 mg Intravenous Q12HR Inge Norman M.D.        baclofen (Lioresal) tablet 10 mg  10 mg Oral TID PRN Paco Munoz M.D.        FLUoxetine (PROzac) capsule 20 mg  20 mg Oral DAILY Paco Munoz M.D.   20 mg at 08/05/23 0542    gabapentin (Neurontin) capsule 200 mg  200 mg Oral BID Paco Munoz M.D.   200 mg at 08/04/23 2107    gabapentin (Neurontin) capsule 300 mg  300 mg  Oral Nightly Asem KAMAR Munoz M.D.   300 mg at 08/04/23 2107    lisinopril (Prinivil) tablet 5 mg  5 mg Oral DAILY Asem KAMAR Munoz M.D.   5 mg at 08/05/23 0540    metoprolol tartrate (Lopressor) tablet 12.5 mg  12.5 mg Oral BID Asem KAMAR Munoz M.D.   12.5 mg at 08/04/23 0526    omeprazole (PriLOSEC) capsule 40 mg  40 mg Oral BID Asem KAMAR Munoz M.D.   40 mg at 08/04/23 1735    senna-docusate (Pericolace Or Senokot S) 8.6-50 MG per tablet 2 Tablet  2 Tablet Oral BID Marlynm KAMAR Munoz M.D.   2 Tablet at 08/04/23 1735    And    polyethylene glycol/lytes (Miralax) PACKET 1 Packet  1 Packet Oral QDAY PRN Asem KAMAR Munoz M.D.        And    magnesium hydroxide (Milk Of Magnesia) suspension 30 mL  30 mL Oral QDAY PRN Asem KAMAR Munoz M.D.        And    bisacodyl (Dulcolax) suppository 10 mg  10 mg Rectal QDAY PRN Asem KAMAR Munoz M.D.        lactated ringers infusion (BOLUS)  500 mL Intravenous Once PRN Marlynm KAMAR Munoz M.D.        Pharmacy Consult Request ...Pain Management Review 1 Each  1 Each Other PHARMACY TO DOSE Paco Munoz M.D.        insulin regular (HumuLIN R,NovoLIN R) injection  1-6 Units Subcutaneous 4X/DAY ACHS Paco Munoz M.D.   1 Units at 08/04/23 1215    And    dextrose 50% (D50W) injection 25 g  25 g Intravenous Q15 MIN PRN Asedolores Munoz M.D.        heparin injection 5,000 Units  5,000 Units Subcutaneous Q8HRS Paco Munoz M.D.   5,000 Units at 08/05/23 0541    ondansetron (Zofran) syringe/vial injection 4 mg  4 mg Intravenous Q4HRS PRN Inge Norman M.D.   4 mg at 08/04/23 0009    prochlorperazine (Compazine) injection 10 mg  10 mg Intravenous Q6HRS PRN Inge Norman M.D.           Allergies  No Known Allergies    Vital Signs last 24 hours  Temp:  [36.7 °C (98 °F)-38.1 °C (100.6 °F)] 38.1 °C (100.6 °F)  Pulse:  [] 100  Resp:  [18-72] 21  BP: (117-199)/(46-89) 134/51  SpO2:  [90 %-100 %] 97 %    Physical Exam  Physical Exam  Constitutional:       Appearance: He  is ill-appearing.   HENT:      Head: Atraumatic.      Mouth/Throat:      Mouth: Mucous membranes are dry.   Cardiovascular:      Rate and Rhythm: Normal rate and regular rhythm.      Heart sounds: Normal heart sounds.   Pulmonary:      Effort: Pulmonary effort is normal. No respiratory distress.      Breath sounds: Normal breath sounds. No wheezing or rales.   Abdominal:      General: Abdomen is flat.      Palpations: Abdomen is soft.   Musculoskeletal:         General: No swelling, tenderness or deformity.   Skin:     General: Skin is warm and dry.   Neurological:      Mental Status: He is oriented to person, place, and time.      Motor: Weakness present.      Comments: Right hemiparesis, RLE stronger than upper.  Left UE and LE 5/5         Fluids    Intake/Output Summary (Last 24 hours) at 8/5/2023 0736  Last data filed at 8/5/2023 0708  Gross per 24 hour   Intake 545 ml   Output 1200 ml   Net -655 ml       Laboratory  Recent Results (from the past 48 hour(s))   CBC WITH DIFFERENTIAL    Collection Time: 08/03/23  6:12 PM   Result Value Ref Range    WBC 17.9 (H) 4.8 - 10.8 K/uL    RBC 5.51 4.70 - 6.10 M/uL    Hemoglobin 15.2 14.0 - 18.0 g/dL    Hematocrit 46.6 42.0 - 52.0 %    MCV 84.6 81.4 - 97.8 fL    MCH 27.6 27.0 - 33.0 pg    MCHC 32.6 32.3 - 36.5 g/dL    RDW 43.9 35.9 - 50.0 fL    Platelet Count 320 164 - 446 K/uL    MPV 10.3 9.0 - 12.9 fL    Neutrophils-Polys 80.30 (H) 44.00 - 72.00 %    Lymphocytes 12.30 (L) 22.00 - 41.00 %    Monocytes 6.10 0.00 - 13.40 %    Eosinophils 0.60 0.00 - 6.90 %    Basophils 0.20 0.00 - 1.80 %    Immature Granulocytes 0.50 0.00 - 0.90 %    Nucleated RBC 0.00 0.00 - 0.20 /100 WBC    Neutrophils (Absolute) 14.41 (H) 1.82 - 7.42 K/uL    Lymphs (Absolute) 2.20 1.00 - 4.80 K/uL    Monos (Absolute) 1.10 (H) 0.00 - 0.85 K/uL    Eos (Absolute) 0.10 0.00 - 0.51 K/uL    Baso (Absolute) 0.04 0.00 - 0.12 K/uL    Immature Granulocytes (abs) 0.09 0.00 - 0.11 K/uL    NRBC (Absolute) 0.00 K/uL    COMP METABOLIC PANEL    Collection Time: 23  6:12 PM   Result Value Ref Range    Sodium 139 135 - 145 mmol/L    Potassium 3.9 3.6 - 5.5 mmol/L    Chloride 101 96 - 112 mmol/L    Co2 22 20 - 33 mmol/L    Anion Gap 16.0 7.0 - 16.0    Glucose 218 (H) 65 - 99 mg/dL    Bun 15 8 - 22 mg/dL    Creatinine 1.12 0.50 - 1.40 mg/dL    Calcium 9.2 8.4 - 10.2 mg/dL    Correct Calcium 9.0 8.5 - 10.5 mg/dL    AST(SGOT) 306 (H) 12 - 45 U/L    ALT(SGPT) 217 (H) 2 - 50 U/L    Alkaline Phosphatase 145 (H) 30 - 99 U/L    Total Bilirubin 1.3 0.1 - 1.5 mg/dL    Albumin 4.3 3.2 - 4.9 g/dL    Total Protein 7.8 6.0 - 8.2 g/dL    Globulin 3.5 1.9 - 3.5 g/dL    A-G Ratio 1.2 g/dL   LIPASE    Collection Time: 23  6:12 PM   Result Value Ref Range    Lipase >3000 (H) 11 - 82 U/L   TROPONIN    Collection Time: 23  6:12 PM   Result Value Ref Range    Troponin T 16 6 - 19 ng/L   ESTIMATED GFR    Collection Time: 23  6:12 PM   Result Value Ref Range    GFR (CKD-EPI) 73 >60 mL/min/1.73 m 2   EKG (Now)    Collection Time: 23  6:46 PM   Result Value Ref Range    Report       [facility] Emergency Dept.    Test Date:  2023  Pt Name:    RHINA LOPEZ                Department: [facility]  MRN:        [id number]                      [facility]  Gender:     Male                         Technician: [id number]  :        1960                   Requested By:JANE MURO  Order #:    [id number]                    Reading MD: Jane Muro    Measurements  Intervals                                Axis  Rate:       68                           P:          52  DE:         166                          QRS:        -67  QRSD:       104                          T:          37  QT:         424  QTc:        451    Interpretive Statements  Sinus rhythm  Left axis  Normal axis  no ST elevation  No previous ECG available for comparison  Electronically Signed On 2023 20:35:45 PDT by Jane  Bhaskar     URINALYSIS    Collection Time: 08/03/23 10:19 PM    Specimen: Urine   Result Value Ref Range    Color Yellow     Character Clear     Specific Gravity <=1.005 <1.035    Ph 5.0 5.0 - 8.0    Glucose Negative Negative mg/dL    Ketones Negative Negative mg/dL    Protein Negative Negative mg/dL    Bilirubin Negative Negative    Nitrite Negative Negative    Leukocyte Esterase Negative Negative    Occult Blood Negative Negative    Micro Urine Req see below    CBC without Differential    Collection Time: 08/04/23  1:35 AM   Result Value Ref Range    WBC 18.9 (H) 4.8 - 10.8 K/uL    RBC 5.56 4.70 - 6.10 M/uL    Hemoglobin 15.4 14.0 - 18.0 g/dL    Hematocrit 48.2 42.0 - 52.0 %    MCV 86.7 81.4 - 97.8 fL    MCH 27.7 27.0 - 33.0 pg    MCHC 32.0 (L) 32.3 - 36.5 g/dL    RDW 45.2 35.9 - 50.0 fL    Platelet Count 257 164 - 446 K/uL    MPV 10.1 9.0 - 12.9 fL   Comp Metabolic Panel (CMP)    Collection Time: 08/04/23  1:35 AM   Result Value Ref Range    Sodium 139 135 - 145 mmol/L    Potassium 4.2 3.6 - 5.5 mmol/L    Chloride 103 96 - 112 mmol/L    Co2 20 20 - 33 mmol/L    Anion Gap 16.0 7.0 - 16.0    Glucose 200 (H) 65 - 99 mg/dL    Bun 18 8 - 22 mg/dL    Creatinine 0.97 0.50 - 1.40 mg/dL    Calcium 8.8 8.4 - 10.2 mg/dL    Correct Calcium 8.7 8.5 - 10.5 mg/dL    AST(SGOT) 439 (H) 12 - 45 U/L    ALT(SGPT) 424 (H) 2 - 50 U/L    Alkaline Phosphatase 150 (H) 30 - 99 U/L    Total Bilirubin 1.9 (H) 0.1 - 1.5 mg/dL    Albumin 4.1 3.2 - 4.9 g/dL    Total Protein 7.7 6.0 - 8.2 g/dL    Globulin 3.6 (H) 1.9 - 3.5 g/dL    A-G Ratio 1.1 g/dL   Magnesium    Collection Time: 08/04/23  1:35 AM   Result Value Ref Range    Magnesium 1.6 1.5 - 2.5 mg/dL   LACTIC ACID    Collection Time: 08/04/23  1:35 AM   Result Value Ref Range    Lactic Acid 4.3 (HH) 0.5 - 2.0 mmol/L   ESTIMATED GFR    Collection Time: 08/04/23  1:35 AM   Result Value Ref Range    GFR (CKD-EPI) 87 >60 mL/min/1.73 m 2   PROCALCITONIN    Collection Time: 08/04/23  1:35 AM    Result Value Ref Range    Procalcitonin 2.66 (H) <0.25 ng/mL   POCT glucose device results    Collection Time: 08/04/23  5:32 AM   Result Value Ref Range    POC Glucose, Blood 157 (H) 65 - 99 mg/dL   Lactic Acid Every four hours after STAT order    Collection Time: 08/04/23  6:44 AM   Result Value Ref Range    Lactic Acid 3.3 (H) 0.5 - 2.0 mmol/L   POCT glucose device results    Collection Time: 08/04/23 11:47 AM   Result Value Ref Range    POC Glucose, Blood 163 (H) 65 - 99 mg/dL   Lactic Acid Every four hours after STAT order    Collection Time: 08/04/23 11:50 AM   Result Value Ref Range    Lactic Acid 3.2 (H) 0.5 - 2.0 mmol/L   POCT glucose device results    Collection Time: 08/04/23  4:30 PM   Result Value Ref Range    POC Glucose, Blood 145 (H) 65 - 99 mg/dL   POCT glucose device results    Collection Time: 08/04/23  9:12 PM   Result Value Ref Range    POC Glucose, Blood 143 (H) 65 - 99 mg/dL   LIPASE    Collection Time: 08/05/23  1:16 AM   Result Value Ref Range    Lipase 1310 (H) 11 - 82 U/L   CBC WITH DIFFERENTIAL    Collection Time: 08/05/23  1:16 AM   Result Value Ref Range    WBC 24.9 (H) 4.8 - 10.8 K/uL    RBC 5.20 4.70 - 6.10 M/uL    Hemoglobin 14.3 14.0 - 18.0 g/dL    Hematocrit 45.2 42.0 - 52.0 %    MCV 86.9 81.4 - 97.8 fL    MCH 27.5 27.0 - 33.0 pg    MCHC 31.6 (L) 32.3 - 36.5 g/dL    RDW 46.8 35.9 - 50.0 fL    Platelet Count 273 164 - 446 K/uL    MPV 10.3 9.0 - 12.9 fL    Neutrophils-Polys 93.20 (H) 44.00 - 72.00 %    Lymphocytes 1.60 (L) 22.00 - 41.00 %    Monocytes 4.40 0.00 - 13.40 %    Eosinophils 0.00 0.00 - 6.90 %    Basophils 0.20 0.00 - 1.80 %    Immature Granulocytes 0.60 0.00 - 0.90 %    Nucleated RBC 0.00 0.00 - 0.20 /100 WBC    Neutrophils (Absolute) 23.15 (H) 1.82 - 7.42 K/uL    Lymphs (Absolute) 0.40 (L) 1.00 - 4.80 K/uL    Monos (Absolute) 1.10 (H) 0.00 - 0.85 K/uL    Eos (Absolute) 0.01 0.00 - 0.51 K/uL    Baso (Absolute) 0.04 0.00 - 0.12 K/uL    Immature Granulocytes (abs) 0.15  (H) 0.00 - 0.11 K/uL    NRBC (Absolute) 0.00 K/uL   Comp Metabolic Panel    Collection Time: 08/05/23  1:16 AM   Result Value Ref Range    Sodium 139 135 - 145 mmol/L    Potassium 4.3 3.6 - 5.5 mmol/L    Chloride 103 96 - 112 mmol/L    Co2 22 20 - 33 mmol/L    Anion Gap 14.0 7.0 - 16.0    Glucose 131 (H) 65 - 99 mg/dL    Bun 26 (H) 8 - 22 mg/dL    Creatinine 1.01 0.50 - 1.40 mg/dL    Calcium 7.5 (L) 8.4 - 10.2 mg/dL    Correct Calcium 7.7 (L) 8.5 - 10.5 mg/dL    AST(SGOT) 127 (H) 12 - 45 U/L    ALT(SGPT) 227 (H) 2 - 50 U/L    Alkaline Phosphatase 123 (H) 30 - 99 U/L    Total Bilirubin 0.9 0.1 - 1.5 mg/dL    Albumin 3.8 3.2 - 4.9 g/dL    Total Protein 7.0 6.0 - 8.2 g/dL    Globulin 3.2 1.9 - 3.5 g/dL    A-G Ratio 1.2 g/dL   MAGNESIUM    Collection Time: 08/05/23  1:16 AM   Result Value Ref Range    Magnesium 1.5 1.5 - 2.5 mg/dL   LACTIC ACID    Collection Time: 08/05/23  1:16 AM   Result Value Ref Range    Lactic Acid 3.3 (H) 0.5 - 2.0 mmol/L   TROPONIN    Collection Time: 08/05/23  1:16 AM   Result Value Ref Range    Troponin T 91 (H) 6 - 19 ng/L   CRP QUANTITIVE (NON-CARDIAC)    Collection Time: 08/05/23  1:16 AM   Result Value Ref Range    Stat C-Reactive Protein 19.73 (H) 0.00 - 0.75 mg/dL   PROCALCITONIN    Collection Time: 08/05/23  1:16 AM   Result Value Ref Range    Procalcitonin 2.80 (H) <0.25 ng/mL   Triglyceride    Collection Time: 08/05/23  1:16 AM   Result Value Ref Range    Triglycerides 137 0 - 149 mg/dL   D-DIMER    Collection Time: 08/05/23  1:16 AM   Result Value Ref Range    D-Dimer 14.80 (H) 0.00 - 0.50 ug/mL (FEU)   LDH    Collection Time: 08/05/23  1:16 AM   Result Value Ref Range    LDH Total 466 (H) 107 - 266 U/L   ESTIMATED GFR    Collection Time: 08/05/23  1:16 AM   Result Value Ref Range    GFR (CKD-EPI) 82 >60 mL/min/1.73 m 2   EKG    Collection Time: 08/05/23  1:34 AM   Result Value Ref Range    Report       [facility]    Test Date:  2023-08-05  Pt Name:    RHINA LOPEZ                 Department: [facility]  MRN:        [id number]                      Room:       [facility]  Gender:     Male                         Technician: [id number]  :        1960                   Requested By:REX GREEN  Order #:    [id number]                    Reading MD: Adriana Ram    Measurements  Intervals                                Axis  Rate:       108                          P:          35  VA:         146                          QRS:        -58  QRSD:       94                           T:          42  QT:         341  QTc:        457    Interpretive Statements  Sinus tachycardia, 108 bpm  Left axis deviation  Poor R wave progression  Compared to ECG 2023 18:46:51  Similar in appearance  Electronically Signed On 2023 05:05:37 PDT by Adriana Ram     POCT arterial blood gas device results    Collection Time: 23  1:52 AM   Result Value Ref Range    Ph 7.219 (LL) 7.400 - 7.500    Pco2 66.3 (HH) 26.0 - 37.0 mmHg    Po2 82 64 - 87 mmHg    Tco2 29 20 - 33 mmol/L    S02 93 93 - 99 %    Hco3 27.1 (H) 17.0 - 25.0 mmol/L    BE -2 -4 - 3 mmol/L    Body Temp 98.2 F degrees    O2 Therapy 70 %    iPF Ratio 117     Ph Temp Zoë 7.222 (LL) 7.400 - 7.500    Po2 Temp Cor 80 64 - 87 mmHg    Specimen Arterial     Tima Test Pass     DelSys Other     LPM 10 lpm   POCT lactate device results    Collection Time: 23  1:52 AM   Result Value Ref Range    iStat Lactate 2.4 (H) 0.5 - 2.0 mmol/L   CoV-2, Flu A/B, And RSV by PCR (2thelooid)    Collection Time: 23  4:58 AM    Specimen: Nasal; Respirate   Result Value Ref Range    Influenza virus A RNA Negative Negative    Influenza virus B, PCR Negative Negative    RSV, PCR Negative Negative    SARS-CoV-2 by PCR NotDetected     SARS-CoV-2 Source Nasal Swab    TROPONIN    Collection Time: 23  5:00 AM   Result Value Ref Range    Troponin T 120 (H) 6 - 19 ng/L   POCT arterial blood gas device results    Collection Time: 23  6:24 AM   Result Value  Ref Range    Ph 7.330 (L) 7.400 - 7.500    Pco2 45.2 (H) 26.0 - 37.0 mmHg    Po2 100 (H) 64 - 87 mmHg    Tco2 25 20 - 33 mmol/L    S02 97 93 - 99 %    Hco3 23.8 17.0 - 25.0 mmol/L    BE -2 -4 - 3 mmol/L    Body Temp see below degrees    O2 Therapy 60 %    iPF Ratio 167     Specimen Arterial     DelSys NIV    POCT lactate device results    Collection Time: 08/05/23  6:24 AM   Result Value Ref Range    iStat Lactate 3.1 (H) 0.5 - 2.0 mmol/L   POCT glucose device results    Collection Time: 08/05/23  6:47 AM   Result Value Ref Range    POC Glucose, Blood 167 (H) 65 - 99 mg/dL       Imaging  CT-CTA CHEST PULMONARY ARTERY W/ RECONS   Final Result         1. No pulmonary embolus.   2. Mild bibasilar airspace disease, atelectasis versus infiltrate   3. Small simple right pleural effusion.   4. Mild ascites in the upper quadrant.   5. Minimal subsegmental atelectasis in upper lung zones.            DX-CHEST-PORTABLE (1 VIEW)   Final Result      No significant change from prior exam.      [id number]/O   Final Result         1. No choledocholithiasis. No CBD dilatation.      2. Cholelithiasis.      3. Fluid surrounding the pancreas, in keeping with acute pancreatitis.      US-RUQ   Final Result      1.  Hepatic steatosis.      2.  Cholelithiasis.      CT-ABDOMEN-PELVIS WITH   Final Result         1.  Cholelithiasis.      2.  Marked acute pancreatitis      DX-CHEST-PORTABLE (1 VIEW)   Final Result      Hypoinflation with borderline cardiomegaly. No lobar consolidation or large pleural effusions.             Assessment/Plan  #Acute pancreatitis 2/2 undetermined etiology (possible passed gallstone), no gall stones on MRCP  - MRCP negative for stones, CBD 3mm  - Triglycerides 137  - Lipase > 3000 on admission  - In ED Dr. Cordova recommended cholecystectomy during this stay after pancreatitis resolves  Plan:  - Dilaudid for pain  - Continue Zosyn for empiric coverage  - s/p IV fluids (2L on day one) no maintenance fluids  running today    #Acute Hypoxic and Hypercapnic respiratory failure  #Aspiration  Plan:  - Continue abx as below  - Titrate off BIPAP as able  - Aggressive diuresis if tolerated  - HOB elevation  - Aspiration precautions  - Titrate oxygen to an SpO2 of 88-94%  - Airway clearance as needed    #Leukocytosis 2/2 pancreatitis as well as possible aspiration pneumonia  - Debris in airway on CTA from 8/5  Plan:  - Continue Vanc/Zosyn for now   - Blood cultures   - MRSA nares    #History of CVA with baseline right hemiparesis   Plan:  - Currently holding aspirin and statin due to NPO    #CKD III  Plan:  - Monitor UOP  - Avoid nephrotoxins as able  - Monitor renal function on daily labs    #Type II DM  Plan:  - Accuchecks AC/HS  - SSI insulin per scale  - Hold PO meds while in ICU  - Hypoglycemia protocol ordered    #History of HTN  Plan:  - Continue lisinopril and metoprolol when ok to swallow    #GERD  Plan:  - Continue pepcid      Discussed patient condition and risk of morbidity and/or mortality with Family, RN, RT, Pharmacy, and Patient.    The patient remains critically ill.  Critical care time = 60 minutes in directly providing and coordinating critical care and extensive data review.  No time overlap and excludes procedures.    Yazmin Gastelum MD RD  Pulmonary and Critical Care    Available on Voalte

## 2023-08-05 NOTE — CARE PLAN
Problem: Ventilation  Goal: Ability to achieve and maintain unassisted ventilation or tolerate decreased levels of ventilator support  Description: Target End Date:  4 days     Document on Vent flowsheet    1.  Support and monitor invasive and noninvasive mechanical ventilation  2.  Monitor ventilator weaning response  3.  Perform ventilator associated pneumonia prevention interventions  4.  Manage ventilation therapy by monitoring diagnostic test results  Outcome: Progressing   Pt taken off of BiPap and now on Oxymask. Tolerating well.

## 2023-08-05 NOTE — PROGRESS NOTES
Concern for pt's decline in condition expressed by charge RN Hernan and primary RN Kaity. Pt seen by this RN at approximately 0050 hours. Pt is lethargic-appearing and is difficult to arouse to ask questions, and is unable to answer orientation questions. Pt is able to indicate with a head nod that he is having pain, and is able to point at the LUQ of his abdomen as the source, though he is weak-appearing and raising his arm to point takes time. Pt is tachypneic at 32 breaths per minute, with a recent increase in oxygen demands, from 2.5L via NC to 6L via oxymask in the last 1.5 hours per report from primary RN. Pt is warm to the touch, though axillary temperature reads at 98.2°F. See flowsheet for vitals obtained during assessment. Dr. Kyle updated on above findings via Voalte at 0109 hours; Dr. Kyle states she will come shortly to see pt.

## 2023-08-05 NOTE — PROGRESS NOTES
OVERNIGHT HOSPITALIST:    Paged by nursing Staff about this patient around 1 AM this morning, concerned with patient increasing needs of oxygen, tachycardia and increasingly confused.  Came to the room to evaluate patient.  Had to increase patient to 6 L via oxy mask, he was at 2.5 L earlier.  Patient looks in pain, comfortable but he is unable to fully answer questions with long sentences.  He says he is in pain.  Per nursing staff, he was much more oriented earlier.      Brief History: 63-year-old male, history of CVA with residual right hemiparesis, CKD III, DM2 and GERD who was admitted on 8/3 with acute severe biliary pancreatitis.  MRCP negative for CBD stone.      Vitals: Temp:100.6       HR:110      RR:28      BP:199/83  Gen: Ill-appearing, looks uncomfortable and in pain, moaning  Lungs: Mild respiratory distress, using accessory muscles, diffuse wheezing.  He is not taking deep breaths and has shallow breathing, bases are difficult to evaluate.  Tachypneic  Heart: Tachycardic, no murmurs heard.   Abd: Mild abdominal distention, moderate pain over the epigastric area.  No guarding and no rebound  Neuro: Confused but following commands appropriately.  He has chronic right hemiparesis.  Answering much questions with more than 1 word/yes and no questions.  Per nursing staff, has some difficulty with speech at baseline but more confused now.  There is no additional new weakness, facial droop, vision changes or additional concerns for new stroke besides new confusion    A/P:  #1:Acute Hypoxic/hypercarbic respiratory Failure  #2:Sepsis  #3.Elevated Ddimer  #4.Biliary Pancreatitis      -ABG was obtained.  Patient is acidotic with pH of 7.21 and has elevated CO2 of 66.3.  -I am transferring patient to the ICU now.  He will need to be started on BiPAP.  -He is febrile now, has increasing WBC and procalcitonin.  I am discontinuing Rocephin and metronidazole and will start him on vancomycin and Zosyn for  broad-spectrum and additional coverage.  -I initially considered the patient was having worsening of severe pancreatitis but many other differential diagnosis was possible.  He is now wheezing, having increasing respiratory respiratory distress.  -I ordered stat labs.  Noticed that lipase and LFTs are actually improving, reason why evolving pancreatitis diagnosis became less likely.  -D-dimer came significantly elevated to 14.  Adding CTA chest to rule out PE and COVID/viral panel test.  -RT team has been working with him.  He will need also nebulization treatment as needed.        The patient remains critically ill.  Critical care time =71  minutes in directly providing and coordinating critical care and extensive data review.  No time overlap and excludes procedures.

## 2023-08-05 NOTE — PROGRESS NOTES
Dr. Kyle at bedside at 0125 hours. NIH assessed at 14 by this RN; previous NIH assessed at 8 at 2000; see charting. Dr Arnold aware of NIH increase.

## 2023-08-05 NOTE — PROGRESS NOTES
Pt's wife Stephanie was able to be reached via phone at this time. An update on pt's tx to ICU as well as his current condition was provided by this RN. Call transferred to Dr. Kyle to provide additional information and discuss potential for intubation.

## 2023-08-05 NOTE — PROGRESS NOTES
1937= assisted patient up to recliner, took patients vitals   2200= bladder scanned patient and assisted patient with getting a drink of water  2300= patient seems to be uncomfortable in bed, attempted to help patient by straightening him in bed  2333= vitals were taken  0100= patient seems to be sleeping

## 2023-08-05 NOTE — PROGRESS NOTES
Pharmacy Vancomycin Kinetics Note for 8/5/2023     63 y.o. male on Vancomycin day # 1     Vancomycin Indication (AUC Dosing): S. aureus pneumonia    Provider specified end date: 08/15/23    Active Antibiotics (From admission, onward)      Ordered     Ordering Provider       Sat Aug 5, 2023  2:21 AM    08/05/23 0221  vancomycin 1250 mg/250mL NS IVPB premix  (vancomycin (VANCOCIN) IV (LD + Maintenance))  EVERY 12 HOURS         Inge Norman M.D.       Sat Aug 5, 2023  2:14 AM    08/05/23 0214  vancomycin 2500 mg/500mL NS IVPB premix  (vancomycin (VANCOCIN) IV (LD + Maintenance))  ONCE         Inge Norman M.D.       Sat Aug 5, 2023  1:57 AM    08/05/23 0157  MD Alert...Vancomycin per Pharmacy  PHARMACY TO DOSE        Question:  Indication(s) for vancomycin?  Answer:  Pneumonia    Inge Norman M.D.    08/05/23 0157  piperacillin-tazobactam (Zosyn) 3.375 g in  mL IVPB  (piperacillin-tazobactam (ZOSYN) IV (Extended-infusion) PANEL )  ONCE        See Hyperspace for full Linked Orders Report.    Inge Norman M.D.    08/05/23 0157  piperacillin-tazobactam (Zosyn) 3.375 g in  mL IVPB  (piperacillin-tazobactam (ZOSYN) IV (Extended-infusion) PANEL )  EVERY 8 HOURS        See Hyperspace for full Linked Orders Report.    Inge Norman M.D.            Dosing Weight: 99.3 kg (218 lb 14.7 oz)      Admission History: Admitted on 8/3/2023 for Symptomatic cholelithiasis [K80.20]  Pertinent history: Pt is 64 yo male started on zosyn and vancomycin for pneumonia    Allergies:     Patient has no known allergies.     Pertinent cultures to date:     Results       Procedure Component Value Units Date/Time    URINALYSIS [[id number]] Collected: 08/03/23 2219    Order Status: Completed Specimen: Urine Updated: 08/03/23 2236     Color Yellow     Character Clear     Specific Gravity <=1.005     Ph 5.0     Glucose Negative mg/dL      Ketones Negative mg/dL      Protein Negative mg/dL  "     Bilirubin Negative     Nitrite Negative     Leukocyte Esterase Negative     Occult Blood Negative     Micro Urine Req see below     Comment: Microscopic examination not performed when specimen is clear  and chemically negative for protein, blood, leukocyte esterase  and nitrite.                 Labs:     Estimated Creatinine Clearance: 88.2 mL/min (by C-G formula based on SCr of 0.97 mg/dL).  Recent Labs     235 23  0116   WBC 17.9* 18.9* 24.9*   NEUTSPOLYS 80.30*  --  93.20*     Recent Labs     23  0135   BUN 15 18   CREATININE 1.12 0.97   ALBUMIN 4.3 4.1       Intake/Output Summary (Last 24 hours) at 2023 0223  Last data filed at 2023 0013  Gross per 24 hour   Intake 2658.57 ml   Output 1275 ml   Net 1383.57 ml      BP (!) 168/88   Pulse (!) 108   Temp 36.8 °C (98.2 °F) (Oral)   Resp (!) 32   Ht 1.753 m (5' 9\")   Wt 99.3 kg (218 lb 14.7 oz)   SpO2 94%  Temp (24hrs), Av °C (98.6 °F), Min:36.7 °C (98 °F), Max:37.4 °C (99.3 °F)      List concerns for Vancomycin clearance:     Abnormal LFTs;Obesity;Nephrotoxic drugs;Age    Pharmacokinetics:     AUC kinetics:   Ke (hr ^-1): 0.0776 hr^-1  Half life: 8.93 hr  Clearance: 4.986  Estimated TDD: 2493  Estimated Dose: 1132  Estimated interval: 10.9    A/P:     -  Vancomycin dose: 1250 mg IV q12h     -  Next vancomycin level(s): None ordered    -  Predicted vancomycin AUC from initial AUC test calculator: 501 mg·hr/L    -  Comments: Stage 3a CKD. Concerns for renal accumulation of vancomycin listed above. Pharmacy will follow.     Lisbet Unger, PharmD    "

## 2023-08-06 NOTE — CARE PLAN
Problem: Ventilation  Goal: Ability to achieve and maintain unassisted ventilation or tolerate decreased levels of ventilator support  Description: Target End Date:  4 days     Document on Vent flowsheet    1.  Support and monitor invasive and noninvasive mechanical ventilation  2.  Monitor ventilator weaning response  3.  Perform ventilator associated pneumonia prevention interventions  4.  Manage ventilation therapy by monitoring diagnostic test results  Outcome: Progressing

## 2023-08-06 NOTE — CARE PLAN
The patient is Watcher - Medium risk of patient condition declining or worsening    Shift Goals  Clinical Goals: Wean off bipap, pain management  Patient Goals: Rest and feel better  Family Goals: Patient safety and comfort    Progress made toward(s) clinical / shift goals:    Problem: Respiratory  Goal: Patient will achieve/maintain optimum respiratory ventilation and gas exchange  Description: Target End Date:  Prior to discharge or change in level of care    Document on Assessment flowsheet    1.  Assess and monitor rate, rhythm, depth and effort of respiration  2.  Breath sounds assessed qshift and/or as needed  3.  Assess O2 saturation, administer/titrate oxygen as ordered  4.  Position patient for maximum ventilatory efficiency  5.  Turn, cough, and deep breath with splinting to improve effectiveness  6.  Collaborate with RT to administer medication/treatments per order  7.  Encourage use of incentive spirometer and encourage patient to cough after use and utilize splinting techniques if applicable  8.  Airway suctioning  9.  Monitor sputum production for changes in color, consistency and frequency  10. Perform frequent oral hygiene  11. Alternate physical activity with rest periods  Outcome: Progressing       Patient is not progressing towards the following goals:      Problem: Pain - Standard  Goal: Alleviation of pain or a reduction in pain to the patient’s comfort goal  Description: Target End Date:  Prior to discharge or change in level of care    Document on Vitals flowsheet    1.  Document pain using the appropriate pain scale per order or unit policy  2.  Educate and implement non-pharmacologic comfort measures (i.e. relaxation, distraction, massage, cold/heat therapy, etc.)  3.  Pain management medications as ordered  4.  Reassess pain after pain med administration per policy  5.  If opiods administered assess patient's response to pain medication is appropriate per POSS sedation scale  6.  Follow  pain management plan developed in collaboration with patient and interdisciplinary team (including palliative care or pain specialists if applicable)  Outcome: Not Progressing

## 2023-08-06 NOTE — PROGRESS NOTES
Pt continues with episodes of respiratory distress overnight. Dr. Kyle updated via Voalte at 0255 hours and a one-time dose of Lasix was suggested by this RN. Orders received for lab work and CXR.     Pt continues with grunting, tachypnea, wheezing, and notable accessory muscle use. Dr. Kyle updated on above at 0403 hours via Voalte and intubation again suggested by this RN. Dr. Kyle states she will come to bedside to assess pt. RT Veras at bedside at 0404 hours; pt placed back on BiPAP. Dr. Kyle notified of pt placed back on BiPAP at 0410 hours. Pt appearing somewhat less distressed once placed on BiPAP, however he is still tachypneic, breathing in the 30s with notable accessory muscle use and restlessness.    Dr. Kyle at bedside at 0420 hours.

## 2023-08-06 NOTE — PROGRESS NOTES
12-hour chart check complete.    Monitor Summary  Rhythm: SR/ST  Rate:   Ectopy: n/a  Measurements: .14/.10/.34

## 2023-08-06 NOTE — PROGRESS NOTES
OVERNIGHT HOSPITALIST:    Paged by nursing staff multiple times about this patient this evening.  He is having increasing agitation at the end of the shift.  Patient did well overall until about 4 AM this morning.  He remained most of the night with 6 L of oxygen via oxy mask but started getting progressively agitated and desatted to 83%.  At this point, he was restarted on BiPAP and by the time I came to the ICU to see him he was already improving.  Patient able to answer simple questions, and actually looked better to me than yesterday morning.  I added 0.5 mg of Ativan and was considering to add Lasix and added proBNP.    I reviewed morning laboratory.  Overall, pancreatitis improving with lipase of 204.  I ordered a stat chest x-ray and patient does not seem to be fluid overloaded with pulmonary edema, actually x-ray looks better today.      I once again was called by nursing staff and came to the ICU to emergently evaluate patient as he became progressively agitated, took off BiPAP and had oxygen desaturations as low as mid 70s around 6 AM this morning.  Patient was requiring nursing staff to hold his arms, he was having audible wheezing and was very agitated, raising concern for possible need of emergency intubation.    By the time I came to the room, his O2 saturations were stable above 90% even though requiring multiple nurses in the room to help out.    I added 40 mg of IV Lasix for elevated proBNP of 1600 this morning, requested patient to receive 0.5 mg of Dilaudid and started him on Precedex as I suspected agitation was driving tachypnea.  Patient remains with good O2 sats and with Precedex started significantly coming down.  I also added ABG and stat lactic which was not done within the context of shift change.  Additionally, I reached out to on-call intensivist and discussed the case with Dr. Gastelum asking for additional feedback and input considering if there was any additional alternatives to improve  his overall agitation and maintain respirations.  We wanted to keep him on BiPAP however he is so agitated that he is constantly taking mask off if nursing staff is not holding his arms, he needs intermittent use of restraints.    I provided full in person signout to Dr. Gastelum and remained in the ICU until the end of my shift to ensure safe transition of care.  At this time, we will continue to focus on agitation/ICU delirium control as overall, his underlying pancreatitis, aspiration pneumonia improving.  Likely will need scheduled doses of Dilaudid to optimize pain control This patient is challenged given underlying history of CVA with baseline right-sided hemiparesis making him a possible challenging patient to wean off the vent.    Intensivist team to continue to follow-up on him.         The patient remains critically ill.  Critical care time =69  minutes in directly providing and coordinating critical care and extensive data review.  No time overlap and excludes procedures.

## 2023-08-06 NOTE — PROGRESS NOTES
Pt became restless and agitated, pulled off BiPAP mask, pulled out IV, removed telemetry leads, pulled off condom catheter and was attempting to climb out of bed. Attempts to redirect and educate pt unsuccessful. Pt placed on 15L via NRB mask d/t refusal to keep BiPAP on. RN x 3 at bedside attempting to keep pt in bed and keep oxygen on. Dr. Kyle contacted via Voalte by PETE Patterson at 0558 hours to update on pt condition. Dr. Kyle at bedside at approximately 0600. Pt noted to be in extreme respiratory distress, again wheezing, grunting, and tachypneic. Pt ashen in color; worsening mottling noted to bilateral lower extremities. Pt with worsening use of accessory muscles for breathing and continued increase of work of breathing.

## 2023-08-06 NOTE — PROGRESS NOTES
Critical Care Progress Note    Date of admission  8/3/2023    Chief Complaint  63 y.o. male admitted 8/3/2023 with     Hospital Course  63 y.o. male who presented 8/3/2023 with a history of CVA x 5 (2013), CKD stage III, DM, GERD who is reportedly here visiting family and was admitted with severe abdominal pain.  General surgery was consulted and recommended MRCP which was negative for stones.  He has been managed on GSU for his pain and was started on ceftriaxone/metronidazole 8/4 which was escalated to Vanc and zosyn 8/5 overnight with ICU upgrade.  He is unfortunately unable to provide much history due to his strokes.       WBC count has been escalating. AST/ALT are improving, as are the lipase and lactate.  Troponin increased to 120 from baseline of 16.  This morning patient is complaining of severe pain, grimacing but states that the pain is the same as yesterday and not worsening.  He remains strong in his extremities.  His blood gas has improved significantly overnight but he remains a high risk of worsening respiratory failure and possible intubation.     Interval Problem Update  Reviewed last 24 hour events:  ICU CHECKLIST:  Chart review from the past 24 hours includes imaging, laboratory studies, vital signs and notes available.  Pertinent system review for today's visit includes:  Significant overnight events: Patient again became significantly agitated with tachypnea and tachycardia early this morning.  During this episode of delirium he intermittently desaturated and was uncooperative with supplemental oxygen.  Dr. Kyle reached out to me around 6am with concerns regarding his agitation and respiratory condition and we discussed some ideas for calming him with pain medication and precedex.  He was given dilaudid 0.5mg x 2, ativan x 1 and then placed on a precedex gtt which managed to calm him down. Once calm, his saturations improved and his blood gas this morning looked excellent.  This episode was  discussed with his wife and we will try having her come in early tomorrow morning to see if that will mitigate some of his ICU delirium.     Neuro: Awake and responding to simple questions today, stable from yesterday.  Significant nocturnal delirium  Cardiac: Stable  Pulmonary: Improved on 6L oxymask.  CXR improved. ABG this am 7.42/46/95/31  Heme: WBC count improving   DVT Prophylaxis: Heparin  /Renal: Improved Creatinine from yesterday   I/O: 4626/1350 = +276 = +1435   Sy: None  ID:  Continues on Zosyn (day 2), Day 3 of abx (previously on ceft and metronidazole x 1 day). Vanc given empirically on icu upgrade but discontinued after one dose  Skin: intact  GI/Nutrition: NPO, will trial PO after bedside swallow today    Prophylaxis: Pepcid  Endo: Stable      Review of Systems  Review of Systems   Unable to perform ROS: Mental status change   Gastrointestinal:  Positive for abdominal pain.        Vital Signs for last 24 hours   Temp:  [36.7 °C (98 °F)-37.8 °C (100 °F)] 37.2 °C (98.9 °F)  Pulse:  [] 86  Resp:  [18-62] 23  BP: (109-194)/(41-89) 136/59  SpO2:  [83 %-98 %] 92 %    Hemodynamic parameters for last 24 hours       Respiratory Information for the last 24 hours       Physical Exam   Physical Exam  Constitutional:       General: He is not in acute distress.     Appearance: He is ill-appearing.   HENT:      Head: Atraumatic.      Mouth/Throat:      Mouth: Mucous membranes are dry.   Eyes:      Extraocular Movements: Extraocular movements intact.   Cardiovascular:      Rate and Rhythm: Normal rate and regular rhythm.      Heart sounds: Normal heart sounds.   Pulmonary:      Effort: Pulmonary effort is normal. No respiratory distress.      Breath sounds: Normal breath sounds. No wheezing or rales.   Abdominal:      Palpations: Abdomen is soft.      Tenderness: There is no abdominal tenderness.   Musculoskeletal:         General: No swelling, tenderness or deformity.   Skin:     General: Skin is warm and  dry.   Neurological:      General: No focal deficit present.      Mental Status: He is oriented to person, place, and time.         Medications  Current Facility-Administered Medications   Medication Dose Route Frequency Provider Last Rate Last Admin    SODIUM PHOSPHATE 3 MMOLE/ML IV SOLN             dexmedetomidine (PRECEDEX) 400 mcg/100mL NS premix infusion  0.1-1.5 mcg/kg/hr (Ideal) Intravenous Continuous Inge Norman M.D. 8.8 mL/hr at 08/06/23 1138 0.5 mcg/kg/hr at 08/06/23 1138    Respiratory Therapy Consult   Nebulization Continuous RT Inge AURELIO Norman        HYDROmorphone (Dilaudid) injection 0.5 mg  0.5 mg Intravenous Q3HRS PRN Inge AURELIO Norman   0.5 mg at 08/06/23 0615    HYDROmorphone (Dilaudid) injection 1 mg  1 mg Intravenous Q3HRS PRN Inge Norman M.D.   1 mg at 08/06/23 1131    piperacillin-tazobactam (Zosyn) 3.375 g in  mL IVPB  3.375 g Intravenous Q8HRS Inge Norman M.D.   Stopped at 08/06/23 0858    famotidine (Pepcid) tablet 20 mg  20 mg Oral BID Yazmin Gastelum M.D.        Or    famotidine (Pepcid) injection 20 mg  20 mg Intravenous BID Yazmin Gastelum M.D.   20 mg at 08/06/23 0522    labetalol (Normodyne/Trandate) injection 10 mg  10 mg Intravenous Q4HRS PRN Yazmin Gastelum M.D.   10 mg at 08/06/23 0451    NS infusion   Intravenous Continuous Yazmin Gastelum M.D.   Stopped at 08/05/23 2148    albuterol (Proventil) 2.5mg/0.5ml nebulizer solution 2.5 mg  2.5 mg Nebulization Q2HRS PRN (RT) Yazmin Gastelum M.D.   2.5 mg at 08/06/23 0234    baclofen (Lioresal) tablet 10 mg  10 mg Oral TID PRN Paco Munoz M.D.        FLUoxetine (PROzac) capsule 20 mg  20 mg Oral DAILY Paco Munoz M.D.   20 mg at 08/05/23 0542    gabapentin (Neurontin) capsule 200 mg  200 mg Oral BID Paco Munoz M.D.   200 mg at 08/04/23 2107    gabapentin (Neurontin) capsule 300 mg  300 mg Oral Nightly Paco Munoz M.D.   300 mg at 08/04/23 2107     lisinopril (Prinivil) tablet 5 mg  5 mg Oral DAILY Asedolores Munoz M.D.   5 mg at 08/05/23 0540    metoprolol tartrate (Lopressor) tablet 12.5 mg  12.5 mg Oral BID Asem KAMAR Munoz M.D.   12.5 mg at 08/04/23 0526    senna-docusate (Pericolace Or Senokot S) 8.6-50 MG per tablet 2 Tablet  2 Tablet Oral BID Asem KAMAR Munoz M.D.   2 Tablet at 08/04/23 1735    And    polyethylene glycol/lytes (Miralax) PACKET 1 Packet  1 Packet Oral QDAY PRN Paco Munoz M.D.        And    magnesium hydroxide (Milk Of Magnesia) suspension 30 mL  30 mL Oral QDAY PRN Paco Munoz M.D.        And    bisacodyl (Dulcolax) suppository 10 mg  10 mg Rectal QDAY PRN Marlynm KAMAR Munoz M.D.        lactated ringers infusion (BOLUS)  500 mL Intravenous Once PRN Marlynm KAMAR Munoz M.D.        Pharmacy Consult Request ...Pain Management Review 1 Each  1 Each Other PHARMACY TO DOSE Paco Munoz M.D.        insulin regular (HumuLIN R,NovoLIN R) injection  1-6 Units Subcutaneous 4X/DAY ACHS Paco Munoz M.D.   1 Units at 08/05/23 2153    And    dextrose 50% (D50W) injection 25 g  25 g Intravenous Q15 MIN PRN Paco Munoz M.D.        heparin injection 5,000 Units  5,000 Units Subcutaneous Q8HRS Paco Munoz M.D.   5,000 Units at 08/06/23 0522    ondansetron (Zofran) syringe/vial injection 4 mg  4 mg Intravenous Q4HRS PRN Inge Norman M.D.   4 mg at 08/04/23 0009    prochlorperazine (Compazine) injection 10 mg  10 mg Intravenous Q6HRS PRN Inge Norman M.D.           Fluids    Intake/Output Summary (Last 24 hours) at 8/6/2023 1249  Last data filed at 8/6/2023 1200  Gross per 24 hour   Intake 1250.02 ml   Output 1875 ml   Net -624.98 ml       Laboratory  Recent Labs     08/05/23  0152 08/05/23  0624 08/06/23  0745   ISTATAPH 7.219* 7.330* 7.429   ISTATAPCO2 66.3* 45.2* 46.8*   ISTATAPO2 82 100* 95*   ISTATATCO2 29 25 32   KUDPDGE9NLE 93 97 97   ISTATARTHCO3 27.1* 23.8 31.0*   ISTATARTBE -2 -2 6*   ISTATTEMP 98.2  F see below see below   ISTATFIO2 70 60  --    ISTATSPEC Arterial Arterial Arterial   ISTATAPHTC 7.222*  --   --    [id number] 80  --   --          Recent Labs     08/04/23 0135 08/05/23 0116 08/06/23  0324   SODIUM 139 139 137   POTASSIUM 4.2 4.3 3.8   CHLORIDE 103 103 103   CO2 20 22 25   BUN 18 26* 24*   CREATININE 0.97 1.01 0.74   MAGNESIUM 1.6 1.5 1.7   PHOSPHORUS  --   --  1.0*   CALCIUM 8.8 7.5* 7.9*     Recent Labs     08/03/23 1812 08/04/23 0135 08/05/23 0116 08/06/23  0324   ALTSGPT 217* 424* 227* 119*   ASTSGOT 306* 439* 127* 56*   ALKPHOSPHAT 145* 150* 123* 85   TBILIRUBIN 1.3 1.9* 0.9 1.1   LIPASE >3000*  --  1310* 204*   GLUCOSE 218* 200* 131* 171*     Recent Labs     08/03/23 1812 08/04/23 0135 08/05/23 0116 08/05/23 1427 08/06/23  0324   WBC 17.9* 18.9* 24.9* 19.4* 15.9*   NEUTSPOLYS 80.30*  --  93.20*  --   --    LYMPHOCYTES 12.30*  --  1.60*  --   --    MONOCYTES 6.10  --  4.40  --   --    EOSINOPHILS 0.60  --  0.00  --   --    BASOPHILS 0.20  --  0.20  --   --    ASTSGOT 306* 439* 127*  --  56*   ALTSGPT 217* 424* 227*  --  119*   ALKPHOSPHAT 145* 150* 123*  --  85   TBILIRUBIN 1.3 1.9* 0.9  --  1.1     Recent Labs     08/05/23 0116 08/05/23 1427 08/06/23  0324   RBC 5.20 4.69* 4.64*   HEMOGLOBIN 14.3 13.0* 12.8*   HEMATOCRIT 45.2 40.5* 39.5*   PLATELETCT 273 232 205       Imaging  X-Ray:  I have personally reviewed the images and compared with prior images.  CT:    Reviewed  Echo:   pending read    Assessment/Plan  #Acute pancreatitis 2/2 undetermined etiology (possible passed gallstone), no gall stones on MRCP  - MRCP negative for stones, CBD 3mm  - Triglycerides 137  - Lipase > 3000 on admission  - In ED Dr. Cordova recommended cholecystectomy during this stay after pancreatitis resolves  Plan:  - Dilaudid for pain  - Continue Zosyn for empiric coverage  - s/p IV fluids (2L on day one) no maintenance fluids running today  - AST/ALT continue to trend down  - T bili is normalized  -  Alk phos WNL today  - Lipase down to 204 (no need to trend)  - Lactic acidosis resolved  - WBC count trending down as well  - Difficult to follow patients exam due to his delirium and prior strokes but by all objective parameters appears to be improved     #Acute Hypoxic and Hypercapnic respiratory failure  #Aspiration  Plan:  - Continue abx as below  - Titrate off BIPAP as able  - Aggressive diuresis if tolerated  - HOB elevation  - Aspiration precautions  - Titrate oxygen to an SpO2 of 88-94%  - Airway clearance as needed  - Continue precedex, dilaudid as needed to keep calm     #ICU Delirium  Plan:  - Delirium precautions  - Avoid sedative medications when possible  - Avoid ativan if possible  - Ok for precedex and pain control with dilaudid  - Frequent reorientation  - Daytime light  - Daily CAM ICU      #Leukocytosis 2/2 pancreatitis as well as possible aspiration pneumonia  - Debris in airway on CTA from 8/5  Plan:  - Continue Zosyn for now   - Blood cultures   - MRSA nares negative, vanc discontinued     #History of CVA with baseline right hemiparesis   Plan:  - Currently holding aspirin and statin due to NPO     #CKD III  Plan:  - Monitor UOP  - Avoid nephrotoxins as able  - Monitor renal function on daily labs     #Type II DM  Plan:  - Accuchecks AC/HS  - SSI insulin per scale  - Hold PO meds while in ICU  - Hypoglycemia protocol ordered     #History of HTN  Plan:  - Continue lisinopril and metoprolol when ok to swallow  - PRN labetalol for now     #GERD  Plan:  - Continue pepcid    #ACP  - Discussed intubation with his wife today.  This is a potentially reversible situation and therefore intubation is not contraindicated however due to his history of significant deficits from prior strokes and debility he is not the most robust candidate for intubation.  I simply suggested that she consider in the future discussing with him regarding his wishes in that situation.  She was very receptive.  States that they  always make decisions with quality of life as the primary consideration and if he reached a point where his condition was not treatable or would significantly change his quality of life after discharge she wouldn't want to proceed.  For now we will leave things as they are due to his clinical improvement.     I have performed a physical exam and reviewed and updated ROS and Plan today (8/6/2023). In review of yesterday's note (8/5/2023), there are no changes except as documented above.     Discussed patient condition and risk of morbidity and/or mortality with Family, RN, RT, and Pharmacy  The patient remains critically ill.  Critical care time = 120 minutes in directly providing and coordinating critical care and extensive data review.  No time overlap and excludes procedures.    Yazmin Gastelum MD RD  Pulmonary and Critical Care    Available on Voalte

## 2023-08-06 NOTE — PROGRESS NOTES
12-hour chart check complete.    Monitor Summary  Rhythm: ST  Rate: 108-112  Ectopy: Rare PVCs; rare PACs per monitor tech summary  Measurements: 0.14/0.08/0.32

## 2023-08-06 NOTE — PROGRESS NOTES
12-hour chart check complete.    Monitor Summary  Rhythm: SR/ST  Rate: 's   Ectopy: Rare PVC, Rare BIG, Rare PAC    Measurements: 0.14/0.12/0.42

## 2023-08-06 NOTE — CARE PLAN
The patient is Stable - Low risk of patient condition declining or worsening    Shift Goals  Clinical Goals: Anxiety and Pain control, Wean O2  Patient Goals: ABIMBOLA  Family Goals: Pt. More awake    Progress made toward(s) clinical / shift goals:    Analgesic medication given per pain scale and reassessed appropriately.   Problem: Pain - Standard  Goal: Alleviation of pain or a reduction in pain to the patient’s comfort goal  Outcome: Progressing   Weaning o2 from oxymask to nasal cannula, maintaining o2 sats   Problem: Respiratory  Goal: Patient will achieve/maintain optimum respiratory ventilation and gas exchange  Outcome: Progressing     Patient free of falls, reorientation to avoid safety concerns and falls,    Problem: Fall Risk  Goal: Patient will remain free from falls  Outcome: Progressing       Patient is not progressing towards the following goals:

## 2023-08-06 NOTE — PROGRESS NOTES
Lab called with critical result of phos of 1.0 at 0422. Critical lab result read back to lab.   Dr. Kyle notified of critical lab result at 0429 hours via Voalte.

## 2023-08-06 NOTE — CARE PLAN
The patient is Watcher - Medium risk of patient condition declining or worsening    Shift Goals  Clinical Goals: Wean off bipap, pain management  Patient Goals: Rest and feel better  Family Goals: Patient safety and comfort    Progress made toward(s) clinical / shift goals:    Problem: Pain - Standard  Goal: Alleviation of pain or a reduction in pain to the patient’s comfort goal  Outcome: Progressing     Problem: Knowledge Deficit - Standard  Goal: Patient and family/care givers will demonstrate understanding of plan of care, disease process/condition, diagnostic tests and medications  Outcome: Progressing     Problem: Hemodynamics  Goal: Patient's hemodynamics, fluid balance and neurologic status will be stable or improve  Outcome: Progressing     Problem: Fluid Volume  Goal: Fluid volume balance will be maintained  Outcome: Progressing     Problem: Urinary - Renal Perfusion  Goal: Ability to achieve and maintain adequate renal perfusion and functioning will improve  Outcome: Progressing     Problem: Respiratory  Goal: Patient will achieve/maintain optimum respiratory ventilation and gas exchange  Outcome: Progressing     Problem: Physical Regulation  Goal: Diagnostic test results will improve  Outcome: Progressing     Problem: Skin Integrity  Goal: Skin integrity is maintained or improved  Outcome: Progressing     Problem: Fall Risk  Goal: Patient will remain free from falls  Outcome: Progressing     Problem: Neuro Status  Goal: Neuro status will remain stable or improve  Outcome: Progressing     Problem: Risk for Aspiration  Goal: Patient's risk for aspiration will be absent or decrease  Outcome: Progressing       Patient is not progressing towards the following goals:      Problem: Psychosocial - Patient Condition  Goal: Patient's ability to verbalize feelings about condition will improve  Outcome: Not Progressing     Problem: Psychosocial  Goal: Patient's ability to verbalize feelings about condition will  improve  Outcome: Not Progressing  Goal: Patient's level of anxiety will decrease  Outcome: Not Progressing

## 2023-08-07 NOTE — CARE PLAN
The patient is Watcher - Medium risk of patient condition declining or worsening    Shift Goals  Clinical Goals: Anxiety and Pain control, Wean O2  Patient Goals: ABIMBOLA  Family Goals: Pt. More awake    Progress made toward(s) clinical / shift goals:    Problem: Pain - Standard  Goal: Alleviation of pain or a reduction in pain to the patient’s comfort goal  Description: Target End Date:  Prior to discharge or change in level of care    Document on Vitals flowsheet    1.  Document pain using the appropriate pain scale per order or unit policy  2.  Educate and implement non-pharmacologic comfort measures (i.e. relaxation, distraction, massage, cold/heat therapy, etc.)  3.  Pain management medications as ordered  4.  Reassess pain after pain med administration per policy  5.  If opiods administered assess patient's response to pain medication is appropriate per POSS sedation scale  6.  Follow pain management plan developed in collaboration with patient and interdisciplinary team (including palliative care or pain specialists if applicable)  Outcome: Progressing     Problem: Knowledge Deficit - Standard  Goal: Patient and family/care givers will demonstrate understanding of plan of care, disease process/condition, diagnostic tests and medications  Description: Target End Date:  1-3 days or as soon as patient condition allows    Document in Patient Education    1.  Patient and family/caregiver oriented to unit, equipment, visitation policy and means for communicating concern  2.  Complete/review Learning Assessment  3.  Assess knowledge level of disease process/condition, treatment plan, diagnostic tests and medications  4.  Explain disease process/condition, treatment plan, diagnostic tests and medications  Outcome: Progressing     Problem: Hemodynamics  Goal: Patient's hemodynamics, fluid balance and neurologic status will be stable or improve  Description: Target End Date:  Prior to discharge or change in level of  care    Document on Assessment and I/O flowsheet templates    1.  Monitor vital signs, pulse oximetry and cardiac monitor per provider order and/or policy  2.  Maintain blood pressure per provider order  3.  Hemodynamic monitoring per provider order  4.  Manage IV fluids and IV infusions  5.  Monitor intake and output  6.  Daily weights per unit policy or provider order  7.  Assess peripheral pulses and capillary refill  8.  Assess color and body temperature  9.  Position patient for maximum circulation/cardiac output  10. Monitor for signs/symptoms of excessive bleeding  11. Assess mental status, restlessness and changes in level of consciousness  12. Monitor temperature and report fever or hypothermia to provider immediately. Consideration of targeted temperature management.  Outcome: Progressing     Problem: Fluid Volume  Goal: Fluid volume balance will be maintained  Description: Target End Date:  Prior to discharge or change in level of care    Document on I/O flowsheet    1.  Monitor intake and output as ordered  2.  Promote oral intake as appropriate  3.  Report inadequate intake or output to physician  4.  Administer IV therapy as ordered  5.  Weights per provider order  6.  Assess for signs and symptoms of bleeding  7.  Monitor for signs of fluid overload (respiratory changes, edema, weight gain, increased abdominal girth)  8.  Monitor of signs for inadequate fluid volume (poor skin turgor, dry mucous membranes)  9.  Instruct patient on adherence to fluid restrictions  Outcome: Progressing     Problem: Urinary - Renal Perfusion  Goal: Ability to achieve and maintain adequate renal perfusion and functioning will improve  Description: Target End Date:  Prior to discharge or change in level of care    Document on I/O and Assessment flowsheet    1.  Urine output will remain greater than 0.5ml/Kg/HR  2.  Monitor amount and/or characteristics of urine per order/policy. Specific gravity per order/policy  3.   Assess signs and symptoms of renal dysfunction  Outcome: Progressing     Problem: Mechanical Ventilation  Goal: Safe management of artificial airway and ventilation  Description: Target End Date:  when vent discontinued    Document on VAP flowsheet and Airway LDA    1.  Daily awakening trials per provider order/policy  2.  Suctioning and care of ET/Trach tube (document on LDA)  3.  Collaborate with RT to administer medications/treatments  4.  Ambu bag at bedside and available for transport  5.  Trach patient - replacement trach at bedside  6.  Provide communication tools if applicable  Outcome: Progressing  Goal: Successful weaning off mechanical ventilator, spontaneously maintains adequate gas exchange  Description: Target End Date:  when vent discontinued    1.  Follow universal weaning protocol for patients on mechanical ventilation per order  2.  Review contraindication list.  Obtain provider order to wean in presence of contraindication.  3.  Obtain Andi Sedation-Agitation Score  4.  Andi Score 1-2:  sedation vacation  5.  Andi Score 3-4:  Collaborate with provider and/or RT to determine readiness for trial  6.  Begin 2 hour trial of weaning following protocol  7.  Evaluate for fatigue parameters per protocol  8.  Fatigue parameters triggered:  Stop wean and return to previous ASV% setting or increase % minute volume to offset work or breathing  Outcome: Progressing  Goal: Patient will be able to express needs and understand communication  Description: Target End Date:  when vent discontinued    1.  Assess ability to communicate and understand  2.  Provide communication tools  3.  Collaborate with Speech Therapy for [facility]  Outcome: Progressing     Problem: Physical Regulation  Goal: Diagnostic test results will improve  Description: Target End Date:  Prior to discharge or change in level of care    1.  Monitor lactic acid levels  2.  Monitor ABG's  3.  Monitor diagnostic test results  Outcome:  Progressing  Goal: Signs and symptoms of infection will decrease  Description: Target End Date:  Prior to discharge or change in level of care    1.  Remove potential routes of infection, such as central lines and urinary catheter  2.  Follow facility protocol for changing IV tubing and sites  3.  Collaborate with Infectious Disease  4.  Antibiotic therapy per provider order  5.  Note drug effects and monitor for antibiotic toxicity  Outcome: Progressing     Problem: Skin Integrity  Goal: Skin integrity is maintained or improved  Description: Target End Date:  Prior to discharge or change in level of care    Document interventions on Skin Risk/Justin flowsheet groups and corresponding LDA    1.  Assess and monitor skin integrity, appearance and/or temperature  2.  Assess risk factors for impaired skin integrity and/or pressures ulcers  3.  Implement precautions to protect skin integrity in collaboration with interdisciplinary team  4.  Implement pressure ulcer prevention protocol if at risk for skin breakdown  5.  Confirm wound care consult if at risk for skin breakdown  6.  Ensure patient use of pressure relieving devices  (Low air loss bed, waffle overlay, heel protectors, ROHO cushion, etc)  Outcome: Progressing     Problem: Fall Risk  Goal: Patient will remain free from falls  Description: Target End Date:  Prior to discharge or change in level of care    Document interventions on the Mary Jo Rolon Fall Risk Assessment    1.  Assess for fall risk factors  2.  Implement fall precautions  Outcome: Progressing     Problem: Optimal Care of the Stroke Patient  Goal: Optimal emergency care for the stroke patient  Description: Target End Date:  End of day 1    Time of Onset    1.  Time of last known well obtained  2.  Patient and family/caregiver verbalize understanding of diagnosis, medications and testing  3.  NIHSS performed and documented, including date and time, for ischemic stroke patients prior to any acute  recanalization therapy (thrombolytics or mechanical) or within 12 hours of arrival if no intervention is warranted  4.  Consults and referrals placed to appropriate departments    Medications Administration as Ordered:    1.  Implement appropriate reversal agents for INR greater than 1.5  2.  Pre-alteplase administration of antihypertensives for SBP >185 DBP >110  3.  Post-alteplase administration of antihypertensives for SBP >185, DBP >105  4.  Thrombolytic Therapy for qualifying ischemic stroke patients who arrive within 4.5 hours of time of Last Known Well. Thrombolytic therapy administered within 30 minutes or a documented reason for delay  Outcome: Progressing  Goal: Optimal acute care for the stroke patient  Description: Target End Date:  1 to 3 days    - Vital signs and neuro checks performed and documented per order  - NIHSS completed and documented per order  - Continuous telemetry monitoring for 72 hours or until discontinued by provider  - Head CT without contrast obtained  - Consideration of MRI/MRA  - MRI screening form completed in worklist if MRI ordered  - Echocardiogram with Bubble Study ordered/completed with consideration of ROSALIE  - Carotid Doppler ordered/completed (Not required if CTA of neck completed in ED)  - Lipid Panel obtained within 48 hours of admission  - PT, PTT, INR obtained per Anticoagulation orders (if applicable)  - Antithrombotic therapy by end of hospital day 2 for ischemic stroke. Provider must document reason if contraindicated.  - Venous Thromboembolism (VTE) Prophylaxis by end of hospital day 2 for ischemic and hemorrhagic stroke. Provider must document reason if contraindicated  - Dysphagia screen completed and documented prior to any PO intake. Patient to remain NPO until Speech Therapy evaluation if thrombolytic or thrombectomy performed  - Rehabilitation assessment including PT/OT/SLP evaluations for referral to Physical Medicine and Rehabilitation services. If none  needed, provider needs to document reason  - Neurology consult placed  - Consideration of cardiology consult for cryptogenic strokes  Outcome: Progressing     Problem: Knowledge Deficit - Stroke Education  Goal: Patient's knowledge of stroke and risk factors will improve  Description: Target End Date:  1-3 days or as soon as patient condition allows    Document in Patient Education    1.  Stroke education booklet provided  2.  Education regarding EMS activation, need for follow up, medication prescribed at discharge, risk factors for stroke/lifestyle modifications, warning signs and symptoms of stroke provided  Outcome: Progressing     Problem: Psychosocial - Patient Condition  Goal: Patient's ability to verbalize feelings about condition will improve  Description: Target End Date:  Prior to discharge or change in level of care    1.  Discuss coping with medical condition and its effects  2.  Encourage patient participation in care  3.  Encourage acknowledgement of body changes and accompanying emotions  4.  Perform depression screening  Outcome: Progressing  Goal: Patient's ability to re-evaluate and adapt role responsibilities will improve  Description: Target End Date:  Prior to discharge or change in level of care    1.  Assess family support  2.  Encourage support system participation in care  3.  Encouraged verbalization of feelings regarding caregiver responsibilities  4.  Discuss changes in role and responsibilities caused by patient's condition  Outcome: Progressing     Problem: Discharge Planning - Stroke  Goal: Ensure Stroke Core Measures are met prior to discharge  Description: Target End Date:  Prior to discharge or change in level of care    1. Patient discharged on antithrombotic therapy (Ischemic Stroke)  2. Patient discharged on intensive statin if LDL is greater than or equal to 70 mg/dl (Ischemic Stroke)  3. Patient discharged on anticoagulation therapy for patients with atrial  fibrillation/flutter (Ischemic Stroke)  4. Smoking education/cessation provided if applicable  Outcome: Progressing  Goal: Patient’s continuum of care needs will be met  Description: Target End Date:  Prior to discharge or change in level of care    1.  Potential discharge barriers identified upon admission and throughout hospital stay  2.  Ensure appropriate referrals in place for follow up with specialists after discharge  3.  Ensure appropriate referrals in place for DMEs if applicable  4.  Collaboration with transitional care team and interdisciplinary team to meet discharge needs  5.  Involve patient and family/caregiver in prioritizing goals for discharge planning  6.  Educate patient and caregiver about discharge instructions, medications, and follow up appointments  7.  Referral placed to [facility]  8.  Assure orders and follow up appointments are made for outpatient extended cardiac monitoring if appropriate  Outcome: Progressing     Problem: Neuro Status  Goal: Neuro status will remain stable or improve  Description: Target End Date:  Prior to discharge or change in level of care    Document on Neuro assessment in the Assessment flowsheet    1.  Assess and monitor neurologic status per provider order/protocol/unit policy  2.  Assess level of consciousness and orientation  3.  Assess for speech, dysarthria, dysphagia, facial symmetry  4.  Assess visual field, eye movements, gaze preference, pupil reaction and size  5.  Assess muscle strength and motor response in all four extremities  6.  Assess for sensation (numbness and tingling)  7.  Assess basic neuro reflexes (cough, gag, corneal)  8.  Identify changes in neuro status and report to provider for testing/treatment orders  Outcome: Progressing     Problem: Hemodynamic Monitoring  Goal: Patient's hemodynamics, fluid balance and neurologic status will be stable or improve  Description: Target End Date:  Prior to discharge or change in level of  care    1.  Vital signs, pulse oximetry and cardiac monitor per provider order and/or policy  2.  Frequent pulse checks performed post thrombectomy  3.  Frequent monitoring for signs of bleeding post TPA administration  4.  Proper management of IV infusions  5.  Intake and output monitored per provider order  6.  Daily weight obtained per unit policy or provider order  7.  Peripheral pulses and capillary refill assessed as needed  8.  Monitor for signs/symptoms of excessive bleeding  9.  Body temperature assessed and fevers treated  10. Patient positioned for maximum circulation/cardiac output  Outcome: Progressing     Problem: Respiratory - Stroke Patient  Goal: Patient will achieve/maintain optimum respiratory rate/effort  Description: Target End Date:  Prior to discharge or change in level of care    Document on Assessment flowsheet    1.  Assess and monitor respiratory rate, rhythm, depth and effort of respiration  2.  Oxygenation assessed throughout shift (recommendation of >94% for new stroke patients)  3.  Oxygen administered and/or titrated per order  4.  Collaboration with RT to administer medication/treatments per order  5.  Patient educated on importance of turning, coughing, and deep breathing  6.  Patient positioned for maximum ventilatory efficiency  7.  Airway suctioning provided as needed  8.  Incentive spirometry encouraged 5-10 times every hour or while awake  Outcome: Progressing     Problem: Dysphagia  Goal: Dysphagia will improve  Description: Target End Date:  Prior to discharge or change in level of care    1.  Assess and monitor ability to swallow  2.  Collaborate with Speech Therapy to determine appropriate adaptation for safe administration of medications and oral nutrition  3.  Elevate head of bed to 90 degrees during feedings and for 30 minutes after each feeding  4.  Encourage proper swallowing techniques  5.  Screening on admission or as soon as possible  Outcome: Progressing      Problem: Risk for Aspiration  Goal: Patient's risk for aspiration will be absent or decrease  Description: Target End Date:  Prior to discharge or change in level of care    1.   Complete dysphagia screening on admission  2.   NPO until dysphagia screening complete or medically cleared  3.   Collaborate with Speech Therapy, Clinical Dietitian and interdisciplinary team  4.   Implement aspiration precautions  5.   Assist patient up to chair for meals  6.   Elevate head of bed 90 degrees if patient is unable to get out of bed  7.   Encourage small bites  8.   Ensure foods/liquids are of appropriate consistency  9.   Assess for any signs/symptoms of aspiration  10. Assess breath sounds and vital signs after oral intake  Outcome: Progressing     Problem: Urinary Elimination  Goal: Establish and maintain regular urinary output  Description: Target End Date:  Prior to discharge or change in level of care    Document on I/O and Assessment flowsheets    1.  Evaluate need to continue indwelling catheter every shift  2.  Assess signs and symptoms of urinary retention  3.  Assess post-void residual volumes  4.  Implement bladder training program  5.  Encourage scheduled voidings  6.  Assist patient to sit on bedside commode or toilet for voiding  7.  Educate patient and family/caregiver on use and purpose of urine collection devices (document in Patient Education)  Outcome: Progressing     Problem: Bowel Elimination  Goal: Establish and maintain regular bowel function  Description: Target End Date:  Prior to discharge or change in level of care    1.   Note date of last BM  2.   Educate about diet, fluid intake, medication and activity to promote bowel function  3.   Educate signs and symptoms of constipation and interventions to implement  4.   Pharmacologic bowel management per provider order  5.   Regular toileting schedule  6.   Upright position for toileting  7.   High fiber diet  8.   Encourage hydration  9.    Collaborate with Clinical Dietician  10. Care and maintenance of ostomy if applicable  Outcome: Progressing     Problem: Mobility - Stroke  Goal: Patient's capacity to carry out activities will improve  Description: Target End Date:  Prior to discharge or change in level of care    1.  Assess for barriers to mobility/activity  2.  Implement activity per interdisciplinary team recommendations  3.  Target activity level identified and patient/family/caregiver aware of goal  4.  Provide assistive devices  5.  Instruct patient/caregiver on proper use of assistive/adaptive devices  6.  Schedule activities and rest periods to decrease effects of fatigue  7.  Encourage mobilization to extent of ability  8.  Maintain proper body alignment  9.  Provide adequate pain management to allow progressive mobilization  10. Implement pace maker precautions as needed  Outcome: Progressing  Goal: Spasticity will be prevented or improved  Description: Target End Date:  Prior to discharge or change in level of care    1.  Muscle relaxing agents considered or administered per order  2.  Splints applied properly and used accordingly to therapist's recommendations  3.  Assistance with stretching and range of motion exercises provided  Outcome: Progressing  Goal: Subluxation will be prevented or improved  Description: Target End Date:  Prior to discharge or change in level of care    1.   Ensure proper handling during transfers, ambulation, and repositioning in bed  2.   Reduce traction to affected limb and provide adequate support of weight  3.   Perform passive range of motion  4.  Assist with active range of motion  Outcome: Progressing     Problem: Self Care  Goal: Patient will have the ability to perform ADLs independently or with assistance (bathe, groom, dress, toilet and feed)  Description: Target End Date:  Prior to discharge or change in level of care    Document on ADL flowsheet    1.  Assess the capability and level of deficiency  to perform ADLs  2.  Encourage family/care giver involvement  3.  Provide assistive devices  4.  Consider PT/OT evaluations  5.  Maintain support, give positive feedback, encourage self-care allowing extra time and verbal cuing as needed  6.  Avoid doing something for patients they can do themselves, but provide assistance as needed  7.  Assist in anticipating/planning individual needs  8.  Collaborate with Case Management and  to meet discharge needs  Outcome: Progressing     Problem: Psychosocial  Goal: Patient's ability to verbalize feelings about condition will improve  Description: Target End Date:  Prior to discharge or change in level of care    1.  Discuss coping with medical condition and its effects  2.  Encourage patient participation in care  3.  Encourage acknowledgement of body changes and accompanying emotions  4.  Perform depression screening  Outcome: Progressing  Goal: Patient's ability to re-evaluate and adapt role responsibilities will improve  Description: Target End Date:  Prior to discharge or change in level of care    1.  Assess family support  2.  Encourage support system participation in care  3.  Encouraged verbalization of feelings regarding caregiver responsibilities  4.  Discuss changes in role and responsibilities caused by patient's condition  Outcome: Progressing  Goal: Patient's level of anxiety will decrease  Description: Target End Date:  1-3 days or as soon as patient condition allows    1.  Collaborate with patient and family/caregiver to identify triggers and develop strategies to cope with anxiety  2.  Implement stimuli reduction, calming techniques  3.  Pharmacologic management per provider order  4.  Encourage patient/family/care giver participation  5.  Collaborate with interdisciplinary team including Psychologist or Behavioral Health Team as needed  Outcome: Progressing  Goal: Patient and family will demonstrate ability to cope with life altering diagnosis  and/or procedure  Description: Target End Date:  1-3 days or as soon as patient condition allows    1. Expect initial shock and disbelief following diagnosis of cancer and traumatizing procedures (disfiguring surgery, colostomy, amputation).  2.  Assess patient and family/caregiver for stage of grief currently being experienced. Explain process as appropriate.  3.  Provide open, nonjudgmental environment. Use therapeutic communication skills of Active-Listening, acknowledgment, and so on.  4.  Encourage verbalization of thoughts or concerns and accept expressions of sadness, anger, rejection. Acknowledge normality of these feelings  5.  Be aware of mood swings, hostility, and other acting-out behavior. Set limits on inappropriate behavior, redirect negative thinking  6.  Be aware of debilitating depression. Ask patient direct questions about state of mind.  7.  Visit frequently and provide physical contact as appropriate, or provide frequent phone support as appropriate for setting. Arrange for care provider and support person to stay with patient as needed  8.  Reinforce teaching regarding disease process and treatments and provide information as appropriate about dying. Be honest; do not give false hope while providing emotional support  9.  Review past life experiences, role changes, and coping skills. Talk about things that interest the patient  Outcome: Progressing  Goal: Spiritual and cultural needs incorporated into hospitalization  Description: Target End Date:  End of day 1    1.  Encourage verbalization of feelings, concerns, expectations and needs  2.  Collaborate with Case Management/  3.  Collaborate with Pastoral Care to meet spiritual needs  Outcome: Progressing     Problem: Communication  Goal: The ability to communicate needs accurately and effectively will improve  Description: Target End Date:  End of day 1    1.  Assess ability to communicate and understand  2.  Provide  augmentative or alternative methods of communication devices  3.  Use /language line as appropriate  4.  Collaborate with Speech Therapy as needed  Outcome: Progressing     Problem: Discharge Barriers/Planning  Goal: Patient's continuum of care needs are met  Description: Target End Date:  Prior to discharge or change in level of care    1.  Identify potential discharge barriers on admission and throughout hospitalization  2.  Collaborate with Case Management, , Clinical Educators, Navigators and others on the transitional care team to meet discharge needs  3.  Involve patient/family/caregivers in setting and prioritizing goals for hospitalization and discharge  4.  Ensure Flu vaccinations are addressed  5.  Inquire if patient is interested in the Meds to Bed program  6.  Ensure patient and family/caregiver are able to demonstrate use of equipment as prescribed  7.  Ensure patient and family/caregiver can verbalize understanding of patient education  8.  Explain discharge instructions and medication reconciliation to patient and family/caregiver  Outcome: Progressing     Problem: Mobility  Goal: Patient's capacity to carry out activities will improve  Description: Target End Date:  Prior to discharge or change in level of care    1.  Assess for barriers to mobility/activity  2.  Implement activity per interdisciplinary team recommendations  3.  Target activity level identified and patient/family/caregiver aware of goal  4.  Provide assistive devices  5.  Instruct patient/caregiver on proper use of assistive/adaptive devices  6.  Schedule activities and rest periods to decrease effects of fatigue  7.  Encourage mobilization to extent of ability  8.  Maintain proper body alignment  9.  Provide adequate pain management to allow progressive mobilization  10. Implement pace maker precautions as needed  Outcome: Progressing       Patient is not progressing towards the following goals:

## 2023-08-07 NOTE — PROGRESS NOTES
12-hour chart check complete.    Monitor Summary  Rhythm: SR BBB  Rate:   Ectopy: rPVC, PAC, Bigem  Measurements: 0.16/0.12/0.38

## 2023-08-07 NOTE — PROGRESS NOTES
CT ordered routine. Called CT extension at 11:46 to see when it could be done, no answer, left voicemail. Primary nurse informed.

## 2023-08-07 NOTE — THERAPY
Speech Language Therapy Contact Note    Patient Name: Kobe Spear  Age:  63 y.o., Sex:  male  Medical Record #: [id number]  Today's Date: 8/7/2023    Discussed missed therapy with PETE Mayen       08/07/23 1130   Treatment Variance   Reason For Missed Therapy Medical - Patient Agitated   Initial Contact Note    Initial Contact Note  Order Received and Verified, Speech Therapy Evaluation in Progress with Full Report to Follow.   Interdisciplinary Plan of Care Collaboration   IDT Collaboration with  Nursing   Collaboration Comments This SLP attempted to see patient for clinical swallow evaluation. Per RN, hold for today, as RN attempted to wean down Precedex and patient became too agitated again. RN reported she's inserting NGT today. SLP will hold evaluation for today and re-attempt tomorrow as able/appropriate. Thank you.

## 2023-08-07 NOTE — DISCHARGE PLANNING
Case Management Discharge Planning    Admission Date: 8/3/2023  GMLOS: 4.7  ALOS: 4    6-Clicks ADL Score: 16  6-Clicks Mobility Score: 12  PT and/or OT Eval ordered: No  speech therapy eval.   Post-acute Referrals Ordered: No  Post-acute Choice Obtained: No  Has referral(s) been sent to post-acute provider:  No      Anticipated Discharge Dispo: Discharge Disposition: D/T to home under A care in anticipation of covered skilled care (06)  Discharge Address: [address] [place] [id number]  Discharge Contact Phone Number: [phone]    DME Needed: No    Action(s) Taken: DC Assessment Complete (See below)    Escalations Completed: Pending Discharge Destination    Medically Clear: No    Next Steps: patient deep asleep. Medicated. Wife at bedside. Her plan  is to return to [facility] with spouse once stable enough. They are visiting her brother here in [place]. They used to have a caregiver but recently CG left. If he needs a SNF she wants one in [facility]. She will give me the name of the SNF. We discussed possible option at discharge. She is aware if skilled nursing is need transportation might have to be by w/c van. Further disposition and d/c needs to be evaluated as patient progresses medically. Contact number given to wife.     Barriers to Discharge: Medical clearance and Pending PT Evaluation    Speech therapy eval pending    Is the patient up for discharge tomorrow: No  Work up still in progress.       Care Transition Team Assessment  Case manger role explained to Wife at beside. Acknowledged understanding. Demographics verified patient asleep. Wife at bedside. 62 y/o male presented to Ed with abd pain.  PMH: DM HTN, HLD, CVA with residual deficit rt side.  Lipase >3000 biliary etiology. Bowel rest NPO MRCP. GI cx. Hypoglycemic protocol.  Elevated WBC. Heparin ppx. ICU admit. Precedex, NGT     Information Source  Orientation Level: Oriented X4 (deep asleep wife in room)  Information Given  By: Spouse  Informant's Name: Stephanie  Who is responsible for making decisions for patient? : Patient    Readmission Evaluation  Is this a readmission?: No    Elopement Risk  Legal Hold: No  Ambulatory or Self Mobile in Wheelchair: Yes  Disoriented: No  Psychiatric Symptoms: None  History of Wandering: No  Elopement this Admit: No  Vocalizing Wanting to Leave: No  Displays Behaviors, Body Language Wanting to Leave: No-Not at Risk for Elopement  Elopement Risk: Not at Risk for Elopement    Interdisciplinary Discharge Planning  Lives with - Patient's Self Care Capacity: Spouse  Patient or legal guardian wants to designate a caregiver: No  Support Systems: Family Member(s)  Housing / Facility: [address]  Do You Take your Prescribed Medications Regularly: Yes  Able to Return to Previous ADL's: Yes  Mobility Issues: Yes  Prior Services: skilled nursing Home Aide Services  Patient Prefers to be Discharged to:: Home (lives in [facility]. here visiting)  Assistance Needed: Yes  Durable Medical Equipment: Home Oxygen    Discharge Preparedness  What is your plan after discharge?: Home with help  What are your discharge supports?: Spouse  Prior Functional Level: Needs Assist with Activities of Daily Living    Functional Assesment  Prior Functional Level: Needs Assist with Activities of Daily Living    Finances  Financial Barriers to Discharge: No  Prescription Coverage: Yes    Vision / Hearing Impairment  Vision Impairment : Yes  Right Eye Vision: Wears Glasses  Left Eye Vision: Wears Glasses  Hearing Impairment : No         Advance Directive  Advance Directive?: DPOA for Health Care  Durable Power of  Name and Contact : Stephanie coulter [phone]    Domestic Abuse  Have you ever been the victim of abuse or violence?: No  Physical Abuse or Sexual Abuse: No  Verbal Abuse or Emotional Abuse: No  Possible Abuse/Neglect Reported to:: Not Applicable    Psychological Assessment  History of Substance Abuse: None  History  of Psychiatric Problems: No  Non-compliant with Treatment: No  Newly Diagnosed Illness: Yes    Discharge Risks or Barriers  Discharge risks or barriers?: Other (comment) (pmh CVA. Flacid rt arm. aphasic.)    Anticipated Discharge Information  Discharge Disposition: D/T to home under A care in anticipation of covered skilled care (06)  Discharge Address: [address] [place] [id number]  Discharge Contact Phone Number: [phone]

## 2023-08-07 NOTE — DIETARY
Nutrition Services:     Pt NPO/clears w/ inadequate nutrition x 4 days. Pt would benefit from initiation of diet and advance beyond clears when medically feasible. If PO is contraindicated, MD may want to consider nutrition support.     RD following.

## 2023-08-07 NOTE — PROGRESS NOTES
"Critical Care Progress Note    Date of admission  8/3/2023    Chief Complaint  63 y.o. male admitted 8/3/2023 with abdominal pain    Hospital Course  \"63 y.o. male who presented 8/3/2023 with a history of CVA x 5 (2013), CKD stage III, DM, GERD who is reportedly here visiting family and was admitted with severe abdominal pain.  General surgery was consulted and recommended MRCP which was negative for stones.  He has been managed on GSU for his pain and was started on ceftriaxone/metronidazole 8/4 which was escalated to Vanc and zosyn 8/5 overnight with ICU upgrade.  He is unfortunately unable to provide much history due to his strokes.    WBC count has been escalating. AST/ALT are improving, as are the lipase and lactate.  Troponin increased to 120 from baseline of 16.  This morning patient is complaining of severe pain, grimacing but states that the pain is the same as yesterday and not worsening.  He remains strong in his extremities.  His blood gas has improved significantly overnight but he remains a high risk of worsening respiratory failure and possible intubation.\" Dr. Gastelum    8/6: \"Patient again became significantly agitated with tachypnea and tachycardia early this morning.  During this episode of delirium he intermittently desaturated and was uncooperative with supplemental oxygen.  Dr. Kyle reached out to me around 6am with concerns regarding his agitation and respiratory condition and we discussed some ideas for calming him with pain medication and precedex.  He was given dilaudid 0.5mg x 2, ativan x 1 and then placed on a precedex gtt which managed to calm him down. Once calm, his saturations improved and his blood gas this morning looked excellent.  This episode was discussed with his wife and we will try having her come in early tomorrow morning to see if that will mitigate some of his ICU delirium.\" Dr. Gastelum  8/7: on precedex, alert but not oriented at all. Intermittently agitated stating he's " in pain and then he feels scared and panic. Wife bedside to help reorient him but still unable to really understand her.      Interval Problem Update  Reviewed last 24 hour events:    Cardiac: HR 90s, BP 140s  Pulm: 4L O2  Heme: slowly downtrending 11  /renal: wnl  GI/endo: po  ID:  WBC downtrending, Zosyn 8/5, Bcx NGTD, MRSA/covid/flu/rsv neg  I/O: -1L  Additional Labs/Imaging:    - aspiration risk due to delirium and not following commands  - wean precedex as tolerated and SLP eval - consider NGT if can't do swallow eval  - ABG      Review of Systems  Review of Systems   Unable to perform ROS: Medical condition        Vital Signs for last 24 hours   Temp:  [36.4 °C (97.6 °F)-37.6 °C (99.6 °F)] 36.4 °C (97.6 °F)  Pulse:  [] 106  Resp:  [20-47] 22  BP: (109-188)/() 158/74  SpO2:  [91 %-99 %] 97 %    Hemodynamic parameters for last 24 hours       Respiratory Information for the last 24 hours       Physical Exam   Physical Exam  Constitutional:       Appearance: He is ill-appearing.   HENT:      Head: Atraumatic.      Mouth/Throat:      Mouth: Mucous membranes are dry.   Eyes:      Extraocular Movements: Extraocular movements intact.   Cardiovascular:      Rate and Rhythm: Normal rate and regular rhythm.      Heart sounds: Normal heart sounds.   Pulmonary:      Effort: Pulmonary effort is normal. No respiratory distress.      Comments: Intermittent expiratory wheezes  Abdominal:      Palpations: Abdomen is soft.      Tenderness: There is no abdominal tenderness.   Musculoskeletal:         General: No swelling, tenderness or deformity.   Skin:     General: Skin is warm.   Neurological:      Comments: Not oriented at all, very agitated and moving around in bed. Keeps stating he's scared and in pain but can't communicate more than that         Medications  Current Facility-Administered Medications   Medication Dose Route Frequency Provider Last Rate Last Admin    LORazepam (Ativan) injection 0.5 mg  0.5 mg  Intravenous Once Inge Norman M.D.        haloperidol lactate (Haldol) injection 2 mg  2 mg Intravenous Q2HRS PRN Yahaira Higuera M.D.   2 mg at 08/07/23 1050    morphine ER (Ms Contin) tablet 15 mg  15 mg Oral Q12HRS Yahaira Higuera M.D.        oxyCODONE immediate-release (Roxicodone) tablet 5 mg  5 mg Oral Q4HRS PRN Yahaira Higuera M.D.        oxyCODONE immediate release (Roxicodone) tablet 10 mg  10 mg Oral Q4HRS PRN Yahaira Higuera M.D.        HYDROmorphone (Dilaudid) injection 1 mg  1 mg Intravenous Q2HRS PRN Yahaira Higuera M.D.   1 mg at 08/07/23 1113    MD Alert...ICU Electrolyte Replacement per Pharmacy   Other PHARMACY TO DOSE Yahaira Higuera M.D.        potassium phosphate IVPB 30 mmol in 500 mL D5W (premix)  30 mmol Intravenous Once Yahaira Higuera M.D. 83.3 mL/hr at 08/07/23 1152 30 mmol at 08/07/23 1152    ipratropium-albuterol (DUONEB) nebulizer solution  3 mL Nebulization Q4H PRN (RT) Yahaira Higuera M.D.        busPIRone (Buspar) tablet 10 mg  10 mg Oral TID Yahaira Higuera M.D.        dexmedetomidine (PRECEDEX) 400 mcg/100mL NS premix infusion  0.1-1.5 mcg/kg/hr (Ideal) Intravenous Continuous Inge Norman M.D. 17.7 mL/hr at 08/07/23 0837 1 mcg/kg/hr at 08/07/23 0837    Respiratory Therapy Consult   Nebulization Continuous RT Inge Norman M.D.        piperacillin-tazobactam (Zosyn) 3.375 g in  mL IVPB  3.375 g Intravenous Q8HRS Inge Norman M.D.   Stopped at 08/07/23 0817    famotidine (Pepcid) tablet 20 mg  20 mg Oral BID Yazmin Gastelum M.D.        Or    famotidine (Pepcid) injection 20 mg  20 mg Intravenous BID Yazmin Gastelum M.D.   20 mg at 08/07/23 0507    labetalol (Normodyne/Trandate) injection 10 mg  10 mg Intravenous Q4HRS PRN Yazmin Gastelum M.D.   10 mg at 08/07/23 0319    NS infusion   Intravenous Continuous Yazmin Gastelum M.D.   Stopped at 08/05/23 2148    baclofen (Lioresal) tablet 10 mg  10  mg Oral TID PRN Paco Munoz M.D.        FLUoxetine (PROzac) capsule 20 mg  20 mg Oral DAILY Asedolores Munoz M.D.   20 mg at 08/05/23 0542    gabapentin (Neurontin) capsule 200 mg  200 mg Oral BID Paco Munoz M.D.   200 mg at 08/04/23 2107    gabapentin (Neurontin) capsule 300 mg  300 mg Oral Nightly Asedolores Munoz M.D.   300 mg at 08/04/23 2107    lisinopril (Prinivil) tablet 5 mg  5 mg Oral DAILY Paco Munoz M.D.   5 mg at 08/05/23 0540    metoprolol tartrate (Lopressor) tablet 12.5 mg  12.5 mg Oral BID Paco Munoz M.D.   12.5 mg at 08/04/23 0526    senna-docusate (Pericolace Or Senokot S) 8.6-50 MG per tablet 2 Tablet  2 Tablet Oral BID Paco Munoz M.D.   2 Tablet at 08/04/23 1735    And    polyethylene glycol/lytes (Miralax) PACKET 1 Packet  1 Packet Oral QDAY PRN Paco Munoz M.D.        And    magnesium hydroxide (Milk Of Magnesia) suspension 30 mL  30 mL Oral QDAY PRN Paco Munoz M.D.        And    bisacodyl (Dulcolax) suppository 10 mg  10 mg Rectal QDAY PRN Paco Munoz M.D.        lactated ringers infusion (BOLUS)  500 mL Intravenous Once PRN Paco Munoz M.D.        Pharmacy Consult Request ...Pain Management Review 1 Each  1 Each Other PHARMACY TO DOSE Paco Munoz M.D.        insulin regular (HumuLIN R,NovoLIN R) injection  1-6 Units Subcutaneous 4X/DAY ACHS Paco Munoz M.D.   1 Units at 08/05/23 2153    And    dextrose 50% (D50W) injection 25 g  25 g Intravenous Q15 MIN PRN Paco Munoz M.D.        heparin injection 5,000 Units  5,000 Units Subcutaneous Q8HRS Paco Munoz M.D.   5,000 Units at 08/07/23 0508    ondansetron (Zofran) syringe/vial injection 4 mg  4 mg Intravenous Q4HRS PRN Inge Norman M.D.   4 mg at 08/04/23 0009    prochlorperazine (Compazine) injection 10 mg  10 mg Intravenous Q6HRS PRN Inge Norman M.D.           Fluids    Intake/Output Summary (Last 24 hours) at 8/7/2023 1257  Last data filed at  8/7/2023 1200  Gross per 24 hour   Intake 249.09 ml   Output 1085 ml   Net -835.91 ml         Laboratory  Recent Labs     08/05/23  0152 08/05/23  0624 08/06/23  0745 08/07/23  1107   ISTATAPH 7.219* 7.330* 7.429 7.374*   ISTATAPCO2 66.3* 45.2* 46.8* 61.5*   ISTATAPO2 82 100* 95* 20*   ISTATATCO2 29 25 32 38*   SMIMWPZ9WLG 93 97 97 28*   ISTATARTHCO3 27.1* 23.8 31.0* 35.9*   ISTATARTBE -2 -2 6* 9*   ISTATTEMP 98.2 F see below see below 98.5 F   ISTATFIO2 70 60  --   --    ISTATSPEC Arterial Arterial Arterial Arterial   ISTATAPHTC 7.222*  --   --  7.375*   XHYXRJGA5RB 80  --   --  20*           Recent Labs     08/05/23 0116 08/06/23 0324 08/06/23  1940 08/07/23  0220   SODIUM 139 137  --  146*   POTASSIUM 4.3 3.8  --  3.5*   CHLORIDE 103 103  --  107   CO2 22 25  --  30   BUN 26* 24*  --  29*   CREATININE 1.01 0.74  --  0.76   MAGNESIUM 1.5 1.7  --  2.3   PHOSPHORUS  --  1.0* 1.6* 1.6*   CALCIUM 7.5* 7.9*  --  7.8*       Recent Labs     08/05/23  0116 08/06/23 0324 08/07/23 0220   ALTSGPT 227* 119* 80*   ASTSGOT 127* 56* 35   ALKPHOSPHAT 123* 85 78   TBILIRUBIN 0.9 1.1 0.7   LIPASE 1310* 204*  --    GLUCOSE 131* 171* 142*       Recent Labs     08/05/23  0116 08/05/23 1427 08/06/23 0324 08/07/23 0220   WBC 24.9* 19.4* 15.9* 10.6   NEUTSPOLYS 93.20*  --   --   --    LYMPHOCYTES 1.60*  --   --   --    MONOCYTES 4.40  --   --   --    EOSINOPHILS 0.00  --   --   --    BASOPHILS 0.20  --   --   --    ASTSGOT 127*  --  56* 35   ALTSGPT 227*  --  119* 80*   ALKPHOSPHAT 123*  --  85 78   TBILIRUBIN 0.9  --  1.1 0.7       Recent Labs     08/05/23  1427 08/06/23  0324 08/07/23  0220   RBC 4.69* 4.64* 3.98*   HEMOGLOBIN 13.0* 12.8* 11.0*   HEMATOCRIT 40.5* 39.5* 34.2*   PLATELETCT 232 205 215         Imaging  X-Ray:  I have personally reviewed the images and compared with prior images.  CT:    Reviewed  Echo:   pending read    Assessment/Plan  #Acute pancreatitis 2/2 undetermined etiology (possible passed gallstone), no  gall stones on MRCP  - MRCP negative for stones, CBD 3mm  - Triglycerides 137  - Lipase > 3000 on admission  - In ED Dr. Cordova recommended cholecystectomy during this stay after pancreatitis resolves  - LFTs downtrending, lipase 204  Plan:  - Adequate pain control - will do SLP eval and start PO vs NGT placement  - Continue Zosyn for empiric coverage  - IVFs as needed     #Acute Hypoxic and Hypercapnic respiratory failure  #Aspiration  Plan:  - Continue abx as below  - PRN lasix - need to be careful due to his pancreatitis and need for fluids earlier  - HOB elevation  - Aspiration precautions  - Titrate oxygen to an SpO2 of 88-94%  - Airway clearance as needed  - Continue precedex, pain meds as needed to keep calm     #ICU Delirium  Plan:  - Delirium precautions  - Avoid sedative medications when possible  - Continue precedex  - SLP eval and start PO pain meds vs NGT placement  - Start Buspar for delirium once PO vs NGT  - Frequent reorientation  - Daytime light  - Daily CAM ICU      #Leukocytosis 2/2 pancreatitis as well as possible aspiration pneumonia  - Debris in airway on CTA from 8/5  Plan:  - Continue Zosyn for now   - Blood cultures   - MRSA nares negative, vanc discontinued     #History of CVA with baseline right hemiparesis   Plan:  - Currently holding aspirin and statin due to NPO     #CKD III  Plan:  - Monitor UOP  - Avoid nephrotoxins as able  - Monitor renal function on daily labs     #Type II DM  Plan:  - Accuchecks AC/HS  - SSI insulin per scale  - Hold PO meds while in ICU  - Hypoglycemia protocol ordered     #History of HTN  Plan:  - Continue lisinopril and metoprolol when ok for PO  - PRN labetalol for now     #GERD  Plan:  - Continue pepcid    #ACP  - Discussed intubation with his wife today.  This is a potentially reversible situation and therefore intubation is not contraindicated however due to his history of significant deficits from prior strokes and debility he is not the most robust  candidate for intubation.  I simply suggested that she consider in the future discussing with him regarding his wishes in that situation.  She was very receptive.  States that they always make decisions with quality of life as the primary consideration and if he reached a point where his condition was not treatable or would significantly change his quality of life after discharge she wouldn't want to proceed.  For now we will leave things as they are due to his clinical improvement.     I have performed a physical exam and reviewed and updated ROS and Plan today (8/7/2023). In review of yesterday's note (8/6/2023), there are no changes except as documented above.     Discussed patient condition and risk of morbidity and/or mortality with Family, RN, RT, and Pharmacy  The patient remains critically ill.  Critical care time = 80 minutes in directly providing and coordinating critical care and extensive data review.  No time overlap and excludes procedures.        Yahaira Higuera MD  Pulmonary and Critical Care Medicine  [facility]

## 2023-08-08 NOTE — PROGRESS NOTES
OVERNIGHT HOSPITALIST:    Paged by nursing Staff about this patient, reporting patient was hypoxic to 85%, currently on 10 L of oxygen via OxyMask.  Patient with increasing tachypnea, and more somnolent and more difficult to arouse as compared to prior in the shift.  I came to the room to evaluate patient.  He is known to me from previous days.  He is minimally responding to sternal rub which is new.  When I came to the room, he was maintaining oxygen levels above 90%.      Brief History: 63-year-old male, history of CVA with residual right hemiparesis, CKD III, DM2 and GERD who was admitted on 8/3 with acute severe biliary pancreatitis.  MRCP negative for CBD stone.  He was transferred to the ICU with acute hypoxic/hypercarbic respiratory failure in need of BiPAP on 8/5.  Patient has episodes of agitation/ICU delirium that typically make oxygenation and has been on Precedex, but overall has been improving pancreatitis.  Pain control with Dilaudid has also helped to keep him calm.    Vitals: Temp:97.4       HR:104      RR:32      BP:142/63  Gen: Arousable with painful stimuli  Lungs: Tachypneic with very shallow breathing and audible wheezing diffusely.  Heart: Tachycardic, no murmurs  Abd: Soft, nontender not distended.  Ext: Mild bilateral pitting edema    I ordered stat portable chest x-ray that is showing increasing pulmonary edema    IMPRESSION:     1.  Increased patchy bilateral airspace opacities.  2.  Probable small bilateral pleural effusions.  3.  Interval placement of an enteric feeding tube which courses towards the stomach with the distal tip not visualized.  4.  Stable enlargement of the cardiomediastinal silhouette.    proBNP 6190  I also added stat ABG: pH was 7.230, pCO2 86.7, pO2 59, HCO3 36.3, VE: 6      A/P:  #1:Hypoxic on 10 L Oxymask: worsening hypercarbic respiratory failure  #2:Acute Metabolic encephalopathy  #3: Worsening pulmonary edema    -Patient with worsening pulmonary edema and CO2  retention on ABG.  -I restarted him on BiPAP and will repeat ABG in 2 hours.  -I also added 40 mg of IV Lasix measured help.  -I suspect that his mentation will improve with movement on CO2.  This is the highest CO2 retention so far.  -If not improving, he will need to be intubated.  -Procalcitonin improving, continue Zosyn.        The patient remains critically ill.  Critical care time =48  minutes in directly providing and coordinating critical care and extensive data review.  No time overlap and excludes procedures.

## 2023-08-08 NOTE — PROGRESS NOTES
12-hour chart check complete.    Monitor Summary  Rhythm: SR-ST  Rate: 's  Ectopy: rPAC, rPVC  Measurements: 0.14/0.10/0.34

## 2023-08-08 NOTE — PROGRESS NOTES
"Critical Care Progress Note    Date of admission  8/3/2023    Chief Complaint  63 y.o. male admitted 8/3/2023 with abdominal pain    Hospital Course  \"63 y.o. male who presented 8/3/2023 with a history of CVA x 5 (2013), CKD stage III, DM, GERD who is reportedly here visiting family and was admitted with severe abdominal pain.  General surgery was consulted and recommended MRCP which was negative for stones.  He has been managed on GSU for his pain and was started on ceftriaxone/metronidazole 8/4 which was escalated to Vanc and zosyn 8/5 overnight with ICU upgrade.  He is unfortunately unable to provide much history due to his strokes.    WBC count has been escalating. AST/ALT are improving, as are the lipase and lactate.  Troponin increased to 120 from baseline of 16.  This morning patient is complaining of severe pain, grimacing but states that the pain is the same as yesterday and not worsening.  He remains strong in his extremities.  His blood gas has improved significantly overnight but he remains a high risk of worsening respiratory failure and possible intubation.\" Dr. Gastelum    8/6: \"Patient again became significantly agitated with tachypnea and tachycardia early this morning.  During this episode of delirium he intermittently desaturated and was uncooperative with supplemental oxygen.  Dr. Kyle reached out to me around 6am with concerns regarding his agitation and respiratory condition and we discussed some ideas for calming him with pain medication and precedex.  He was given dilaudid 0.5mg x 2, ativan x 1 and then placed on a precedex gtt which managed to calm him down. Once calm, his saturations improved and his blood gas this morning looked excellent.  This episode was discussed with his wife and we will try having her come in early tomorrow morning to see if that will mitigate some of his ICU delirium.\" Dr. Gastelum  8/7: on precedex, alert but not oriented at all. Intermittently agitated stating he's " in pain and then he feels scared and panic. Wife bedside to help reorient him but still unable to really understand her.  8/8: overnight, became very somnolent, non-responsive and w/worsening respiratory failure so she was started on bipap after ABG showed respiratory acidosis. This AM, doing better on bipap, able to look at me and mouthing words. Failed attempt to remove off bipap so left it on and let him rest. This afternoon, he is awake on bipap, following commands, calm and resting in bed.       Interval Problem Update  Reviewed last 24 hour events:    Cardiac: HR 90s, BP 150s  Pulm: bipap 60% fio2  Heme: stable  /renal: wnl  GI/endo: po  ID:  WBC uptrending, Zosyn 8/5, Bcx NGTD, MRSA/covid/flu/rsv neg  I/O: -1.6L  Additional Labs/Imaging:    - CXR w/low lung volumes and worse infiltrates in R side c/f aspiration pneumonia vs pneumonitis       Review of Systems  Review of Systems   Unable to perform ROS: Medical condition        Vital Signs for last 24 hours   Temp:  [36.3 °C (97.4 °F)-36.6 °C (97.8 °F)] 36.3 °C (97.4 °F)  Pulse:  [] 96  Resp:  [13-69] 24  BP: (110-214)/() 157/70  SpO2:  [81 %-98 %] 98 %    Hemodynamic parameters for last 24 hours       Respiratory Information for the last 24 hours       Physical Exam   Physical Exam  Constitutional:       Appearance: He is ill-appearing.   HENT:      Head: Atraumatic.      Mouth/Throat:      Mouth: Mucous membranes are dry.   Eyes:      Extraocular Movements: Extraocular movements intact.   Cardiovascular:      Rate and Rhythm: Normal rate and regular rhythm.      Heart sounds: Normal heart sounds.   Pulmonary:      Effort: Pulmonary effort is normal. No respiratory distress.      Breath sounds: Rales present. No wheezing.   Abdominal:      Palpations: Abdomen is soft.      Tenderness: There is no abdominal tenderness.   Musculoskeletal:         General: No swelling, tenderness or deformity.   Skin:     General: Skin is warm.   Neurological:       Comments: Awake, calm, alert and responds to simple questions with thumbs up/down         Medications  Current Facility-Administered Medications   Medication Dose Route Frequency Provider Last Rate Last Admin    famotidine (Pepcid) tablet 20 mg  20 mg Enteral Tube BID Inge Norman M.D.   20 mg at 08/08/23 0613    Or    famotidine (Pepcid) injection 20 mg  20 mg Intravenous BID Inge Norman M.D.        bisacodyl (Dulcolax) suppository 10 mg  10 mg Rectal QDAY PRN Inge Norman M.D.        HYDROmorphone (Dilaudid) injection 1 mg  1 mg Intravenous Q2HRS PRN Yahaira Higuera M.D.   1 mg at 08/07/23 1436    MD Alert...ICU Electrolyte Replacement per Pharmacy   Other PHARMACY TO DOSE Yahaira Higuera M.D.        ipratropium-albuterol (DUONEB) nebulizer solution  3 mL Nebulization Q4H PRN (RT) Yahaira Higuera M.D.        LORazepam (Ativan) injection 2 mg  2 mg Intravenous Q4HRS PRN Yahaira Higuera M.D.        dexmedetomidine (PRECEDEX) 400 mcg/100mL NS premix infusion  0.1-1.5 mcg/kg/hr (Ideal) Intravenous Continuous Inge Norman M.D. 3.5 mL/hr at 08/08/23 0720 0.2 mcg/kg/hr at 08/08/23 0720    Respiratory Therapy Consult   Nebulization Continuous RT Inge Norman M.D.        piperacillin-tazobactam (Zosyn) 3.375 g in  mL IVPB  3.375 g Intravenous Q8HRS Inge Norman M.D.   Stopped at 08/08/23 0940    labetalol (Normodyne/Trandate) injection 10 mg  10 mg Intravenous Q4HRS PRN Yazmin Gastelum M.D.   10 mg at 08/08/23 0642    NS infusion   Intravenous Continuous Yazmin Gastelum M.D.   Stopped at 08/05/23 2148    lactated ringers infusion (BOLUS)  500 mL Intravenous Once PRN Paco Munoz M.D.        Pharmacy Consult Request ...Pain Management Review 1 Each  1 Each Other PHARMACY TO DOSE Paco Munoz M.D.        insulin regular (HumuLIN R,NovoLIN R) injection  1-6 Units Subcutaneous 4X/DAY ACHS Paco Munoz M.D.   1 Units at  08/05/23 2153    And    dextrose 50% (D50W) injection 25 g  25 g Intravenous Q15 MIN PRN Paco Munoz M.D.        heparin injection 5,000 Units  5,000 Units Subcutaneous Q8HRS Paco Munoz M.D.   5,000 Units at 08/08/23 0613    ondansetron (Zofran) syringe/vial injection 4 mg  4 mg Intravenous Q4HRS PRN Inge Norman M.D.   4 mg at 08/04/23 0009    prochlorperazine (Compazine) injection 10 mg  10 mg Intravenous Q6HRS PRN Inge Norman M.D.           Fluids    Intake/Output Summary (Last 24 hours) at 8/8/2023 1411  Last data filed at 8/8/2023 1200  Gross per 24 hour   Intake 885.88 ml   Output 2400 ml   Net -1514.12 ml         Laboratory  Recent Labs     08/07/23  1107 08/07/23  2207 08/08/23  0120   ISTATAPH 7.374* 7.230* 7.469   ISTATAPCO2 61.5* 86.7* 49.1*   ISTATAPO2 20* 59* 56*   ISTATATCO2 38* 39* 37*   FBIEIDT3AGR 28* 83* 90*   ISTATARTHCO3 35.9* 36.3* 35.6*   ISTATARTBE 9* 6* 10*   ISTATTEMP 98.5 F 38.0 C 38.7 C   ISTATFIO2  --  80 50   ISTATSPEC Arterial Arterial Arterial   ISTATAPHTC 7.375* 7.216* 7.443   ROIQIYOE1WV 20* 63* 64           Recent Labs     08/06/23  0324 08/06/23  1940 08/07/23  0220 08/08/23  0430   SODIUM 137  --  146* 145   POTASSIUM 3.8  --  3.5* 3.7   CHLORIDE 103  --  107 107   CO2 25  --  30 29   BUN 24*  --  29* 30*   CREATININE 0.74  --  0.76 0.78   MAGNESIUM 1.7  --  2.3 2.2   PHOSPHORUS 1.0* 1.6* 1.6* 1.9*   CALCIUM 7.9*  --  7.8* 8.7       Recent Labs     08/06/23 0324 08/07/23 0220 08/07/23  2253 08/08/23  0430   ALTSGPT 119* 80*  --  66*   ASTSGOT 56* 35  --  47*   ALKPHOSPHAT 85 78  --  84   TBILIRUBIN 1.1 0.7  --  0.7   LIPASE 204*  --  31  --    GLUCOSE 171* 142*  --  142*       Recent Labs     08/06/23 0324 08/07/23 0220 08/08/23  0430   WBC 15.9* 10.6 13.3*   ASTSGOT 56* 35 47*   ALTSGPT 119* 80* 66*   ALKPHOSPHAT 85 78 84   TBILIRUBIN 1.1 0.7 0.7       Recent Labs     08/06/23 0324 08/07/23 0220 08/08/23  0430   RBC 4.64* 3.98* 4.42*    HEMOGLOBIN 12.8* 11.0* 12.4*   HEMATOCRIT 39.5* 34.2* 38.8*   PLATELETCT 205 215 245         Imaging  X-Ray:  I have personally reviewed the images and compared with prior images.  CT:    Reviewed  Echo:   pending read    Assessment/Plan  #Acute pancreatitis 2/2 undetermined etiology (possible passed gallstone), no gall stones on MRCP  - MRCP negative for stones, CBD 3mm  - Triglycerides 137  - Lipase > 3000 on admission  - In ED Dr. Cordova recommended cholecystectomy during this stay after pancreatitis resolves  - LFTs downtrending, lipase 31  Plan:  - Adequate pain control - continue IV pain meds since he pulled out his NGT  - Continue Zosyn for empiric coverage  - IVFs as needed, caution with tenuous respiratory status     #Acute Hypoxic and Hypercapnic respiratory failure  #Aspiration  Plan:  - Continue Zosyn - c/f aspiration pna vs pneumonitis - if wbc continues to uptrend despite zosyn, will consider addition of vanc  - PRN lasix - need to be careful due to his pancreatitis and need for fluids earlier  - HOB elevation  - Aspiration precautions  - Titrate oxygen to an SpO2 of 88-94%  - Airway clearance as needed     #ICU Delirium  Plan:  - Doing much better today - will avoid any sedating medications and use non-pharmocologic means as much as possible   - Delirium precautions  - Frequent reorientation  - Patient usually sleeps during the day 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 - will try to allow this to happen as much as possible here to help reorient him back to his normal schedule    #Leukocytosis 2/2 pancreatitis as well as possible aspiration pneumonia  - Debris in airway on CTA from 8/5  Plan:  - Continue Zosyn for now   - Blood cultures   - MRSA nares negative, vanc discontinued - if wbc continues to uptrend despite zosyn, will consider addition of vanc     #History of CVA with baseline right hemiparesis   Plan:  - Currently holding aspirin and statin due to NPO     #CKD  III  Plan:  - Monitor UOP  - Avoid nephrotoxins as able  - Monitor renal function on daily labs     #Type II DM  Plan:  - Accuchecks AC/HS  - SSI insulin per scale  - Hold PO meds while in ICU  - Hypoglycemia protocol ordered     #History of HTN  Plan:  - Continue lisinopril and metoprolol when ok for PO  - PRN labetalol for now     #GERD  Plan:  - Continue pepcid      I have performed a physical exam and reviewed and updated ROS and Plan today (8/8/2023). In review of yesterday's note (8/7/2023), there are no changes except as documented above.     Discussed patient condition and risk of morbidity and/or mortality with Family, RN, RT, and Pharmacy  The patient remains critically ill.  Critical care time = 75 minutes in directly providing and coordinating critical care and extensive data review.  No time overlap and excludes procedures.        Yahaira Higuera MD  Pulmonary and Critical Care Medicine  [facility]

## 2023-08-08 NOTE — CARE PLAN
The patient is Unstable - High likelihood or risk of patient condition declining or worsening    Shift Goals  Clinical Goals: Decreased anxiety and agitation  Patient Goals: ABIMBOLA  Family Goals: keep him calm and comfortable    Patient is severely anxious today. RN was not able to re-orient or calm him with words. Spouse assisted at bedside for some time today but she was not able to console him. Patient started to pull at IV's and oxygen, which interfered with care and his best interest. Patient has a sitter at bedside to help with his anxiety and agitation.    Patient is not progressing towards the following goals:      Problem: Pain - Standard  Goal: Alleviation of pain or a reduction in pain to the patient’s comfort goal  Outcome: Not Progressing     Problem: Knowledge Deficit - Standard  Goal: Patient and family/care givers will demonstrate understanding of plan of care, disease process/condition, diagnostic tests and medications  Outcome: Not Progressing     Problem: Hemodynamics  Goal: Patient's hemodynamics, fluid balance and neurologic status will be stable or improve  Outcome: Not Progressing     Problem: Respiratory  Goal: Patient will achieve/maintain optimum respiratory ventilation and gas exchange  Outcome: Not Progressing

## 2023-08-08 NOTE — PROGRESS NOTES
12 hour chart check complete.    Monitor Summary:    Rhythm:  Sinus rhythm, sinus tach    HR:      Ectopy:  PAC, PVC    0.14/ 0.08/ 0.30

## 2023-08-08 NOTE — THERAPY
Speech Language Therapy Contact Note    Patient Name: Kobe Spear  Age:  63 y.o., Sex:  male  Medical Record #: [id number]  Today's Date: 8/8/2023      Pt seen on Bipap this morning. Spoke with RN, who reported that pt remains unable to actively participate in a CSE, plans for NGT replacement. Will continue to hold CSE and evaluate when medically appropriate. Thank you.

## 2023-08-08 NOTE — FLOWSHEET NOTE
08/08/23 1415   Events/Summary/Plan   Events/Summary/Plan Patients remans on HHFNC, decreased FIO2 to 60%, PMA aware   Skin Inspection Respiratory Device Intact   Vital Signs   Pulse (!) 107   Respiration (!) 32   Pulse Oximetry 94 %   $ Pulse Oximetry (Spot Check) Yes   Breath Sounds   RUL Breath Sounds Crackles   RML Breath Sounds Crackles   RLL Breath Sounds Crackles   LUIS Breath Sounds Crackles   LLL Breath Sounds Crackles   Oxygen   O2 (LPM) 60   FiO2% 60 %   O2 Delivery Device Heated High Flow Nasal Cannula   Heated Hi Flow Nasal Cannula    High Flow System Set Up  Yes   $ Heated Hi Flow Nasal Cannula (HHFNC) NICU Yes   Analyzed FIO2 (HHFNC) 60   Flowrate (HHFNC) 60   Humidifier Temperature (HHFNC) 35   Circuit Next Change Date 09/05/23

## 2023-08-08 NOTE — CARE PLAN
The patient is Watcher - Medium risk of patient condition declining or worsening    Shift Goals  Clinical Goals: Rest, Manage Anxiety, Safety, SPo2 >92%  Patient Goals: ABIMBOLA  Family Goals: Rest, Keep him calm, remove self and stimulation    Progress made toward(s) clinical / shift goals:    Pain management, patient able to express pain, however unable to state a 0-10 scale observation considering deficits. Repositioning and rest vital to patients pain   Problem: Pain - Standard  Goal: Alleviation of pain or a reduction in pain to the patient’s comfort goal  Outcome: Progressing  Renal perfusion adequate with park catheter   Problem: Urinary - Renal Perfusion  Goal: Ability to achieve and maintain adequate renal perfusion and functioning will improve  Outcome: Progressing   Patient weaned off bipap and less agitated, showing signs of improvement spo2 sats adequate   Problem: Respiratory  Goal: Patient will achieve/maintain optimum respiratory ventilation and gas exchange  Outcome: Progressing    Patient turning self minorly, many repositions with rn and cna to maintain comfort and skin integrity.   Problem: Skin Integrity  Goal: Skin integrity is maintained or improved  Outcome: Progressing     Problem: Fall Risk  Goal: Patient will remain free from falls  Outcome: Progressing       Patient is not progressing towards the following goals:

## 2023-08-09 NOTE — PROGRESS NOTES
12-hour chart check complete.    Monitor Summary  Rhythm: SR/ST   Rate:   Ectopy: rare-frequent PAC  Rare-Frequent PVC   Measurements: 0.14/0.10/0.36

## 2023-08-09 NOTE — PROGRESS NOTES
"Critical Care Progress Note    Date of admission  8/3/2023    Chief Complaint  63 y.o. male admitted 8/3/2023 with abdominal pain    Hospital Course  \"63 y.o. male who presented 8/3/2023 with a history of CVA x 5 (2013), CKD stage III, DM, GERD who is reportedly here visiting family and was admitted with severe abdominal pain.  General surgery was consulted and recommended MRCP which was negative for stones.  He has been managed on GSU for his pain and was started on ceftriaxone/metronidazole 8/4 which was escalated to Vanc and zosyn 8/5 overnight with ICU upgrade.  He is unfortunately unable to provide much history due to his strokes.    WBC count has been escalating. AST/ALT are improving, as are the lipase and lactate.  Troponin increased to 120 from baseline of 16.  This morning patient is complaining of severe pain, grimacing but states that the pain is the same as yesterday and not worsening.  He remains strong in his extremities.  His blood gas has improved significantly overnight but he remains a high risk of worsening respiratory failure and possible intubation.\" Dr. Gastelum    8/6: \"Patient again became significantly agitated with tachypnea and tachycardia early this morning.  During this episode of delirium he intermittently desaturated and was uncooperative with supplemental oxygen.  Dr. Kyle reached out to me around 6am with concerns regarding his agitation and respiratory condition and we discussed some ideas for calming him with pain medication and precedex.  He was given dilaudid 0.5mg x 2, ativan x 1 and then placed on a precedex gtt which managed to calm him down. Once calm, his saturations improved and his blood gas this morning looked excellent.  This episode was discussed with his wife and we will try having her come in early tomorrow morning to see if that will mitigate some of his ICU delirium.\" Dr. Gastelum  8/7: on precedex, alert but not oriented at all. Intermittently agitated stating he's " in pain and then he feels scared and panic. Wife bedside to help reorient him but still unable to really understand her.  8/8: overnight, became very somnolent, non-responsive and w/worsening respiratory failure so she was started on bipap after ABG showed respiratory acidosis. This AM, doing better on bipap, able to look at me and mouthing words. Failed attempt to remove off bipap so left it on and let him rest. This afternoon, he is awake on bipap, following commands, calm and resting in bed.   8/9: calm, alert. On HFNC. Hypertensive.     Interval Problem Update  Reviewed last 24 hour events:    Cardiac: HR 80s, BP 170s, increase lisinopril, metop  Pulm: HFNC 45%/50L  Heme: stable  /renal: wnl  GI/endo: start TF and FWF  ID:  WBC downtrending, Zosyn 8/5, Bcx NGTD, MRSA/covid/flu/rsv neg  I/O: -2.4L  Additional Labs/Imaging:    - CXR 8/8 w/low lung volumes and worse infiltrates in R side c/f aspiration pneumonia vs pneumonitis  - LFTs worsening, Tbili 3.7 - f/u RUQ US      Review of Systems  Review of Systems   Unable to perform ROS: Medical condition        Vital Signs for last 24 hours   Pulse:  [] 95  Resp:  [] 16  BP: (152-193)/() 179/109  SpO2:  [90 %-97 %] 94 %    Hemodynamic parameters for last 24 hours       Respiratory Information for the last 24 hours       Physical Exam   Physical Exam  Constitutional:       Appearance: He is ill-appearing.   HENT:      Head: Atraumatic.      Mouth/Throat:      Mouth: Mucous membranes are dry.   Eyes:      Extraocular Movements: Extraocular movements intact.   Cardiovascular:      Rate and Rhythm: Normal rate and regular rhythm.      Heart sounds: Normal heart sounds.   Pulmonary:      Effort: Pulmonary effort is normal. No respiratory distress.      Breath sounds: Rales present. No wheezing.   Abdominal:      Palpations: Abdomen is soft.      Tenderness: There is no abdominal tenderness.   Musculoskeletal:         General: No swelling, tenderness or  deformity.   Skin:     General: Skin is warm.   Neurological:      Comments: Awake, calm, alert and responds to simple questions with thumbs up/down         Medications  Current Facility-Administered Medications   Medication Dose Route Frequency Provider Last Rate Last Admin    metoprolol tartrate (Lopressor) tablet 25 mg  25 mg Enteral Tube TWICE DAILY Yahaira Higuera M.D.        lisinopril (Prinivil) tablet 20 mg  20 mg Enteral Tube Q DAY Yahaira Higuera M.D.   20 mg at 08/09/23 0842    Pharmacy Consult: Enteral tube insertion - review meds/change route/product selection   Other PHARMACY TO DOSE Yahaira Higuera M.D.        hydrALAZINE (Apresoline) injection 10 mg  10 mg Intravenous Q6HRS PRN Yahaira Higuera M.D.   10 mg at 08/09/23 1043    thiamine (Vitamin B-1) tablet 100 mg  100 mg Enteral Tube DAILY Yahaira Higuera M.D.   100 mg at 08/09/23 1601    famotidine (Pepcid) tablet 20 mg  20 mg Enteral Tube BID Inge Norman M.D.   20 mg at 08/09/23 0518    Or    famotidine (Pepcid) injection 20 mg  20 mg Intravenous BID Inge Norman M.D.   20 mg at 08/08/23 1829    bisacodyl (Dulcolax) suppository 10 mg  10 mg Rectal QDAY PRN Inge Norman M.D.        acetaminophen (Tylenol) tablet 650 mg  650 mg Enteral Tube Q4HRS PRN Yahaira Higuera M.D.   650 mg at 08/09/23 1602    HYDROmorphone (Dilaudid) injection 1 mg  1 mg Intravenous Q2HRS PRN Yahaira Higuera M.D.   1 mg at 08/09/23 0423    MD Alert...ICU Electrolyte Replacement per Pharmacy   Other PHARMACY TO DOSE Yahaira Higuera M.D.        ipratropium-albuterol (DUONEB) nebulizer solution  3 mL Nebulization Q4H PRN (RT) Yahaira Higuera M.D.        Respiratory Therapy Consult   Nebulization Continuous RT AGATHA Banuelos.D.        piperacillin-tazobactam (Zosyn) 3.375 g in  mL IVPB  3.375 g Intravenous Q8HRS Yahaira Higuera M.D. 25 mL/hr at 08/09/23 1334 3.375 g at 08/09/23 1334     labetalol (Normodyne/Trandate) injection 10 mg  10 mg Intravenous Q4HRS PRN Yazmin Gastelum M.D.   10 mg at 08/09/23 1218    NS infusion   Intravenous Continuous Yazmin Gastelum M.D.   Stopped at 08/05/23 2148    Pharmacy Consult Request ...Pain Management Review 1 Each  1 Each Other PHARMACY TO DOSE Paco Munoz M.D.        insulin regular (HumuLIN R,NovoLIN R) injection  1-6 Units Subcutaneous 4X/DAY ACHS Paco Munoz M.D.   1 Units at 08/09/23 0627    And    dextrose 50% (D50W) injection 25 g  25 g Intravenous Q15 MIN PRN Paco Munoz M.D.        heparin injection 5,000 Units  5,000 Units Subcutaneous Q8HRS Paco Munoz M.D.   5,000 Units at 08/09/23 1335    ondansetron (Zofran) syringe/vial injection 4 mg  4 mg Intravenous Q4HRS PRN Inge Norman M.D.   4 mg at 08/04/23 0009    prochlorperazine (Compazine) injection 10 mg  10 mg Intravenous Q6HRS PRN Inge Norman M.D.           Fluids    Intake/Output Summary (Last 24 hours) at 8/9/2023 1651  Last data filed at 8/9/2023 1600  Gross per 24 hour   Intake 287.14 ml   Output 3035 ml   Net -2747.86 ml       Laboratory  Recent Labs     08/07/23  1107 08/07/23  2207 08/08/23  0120   ISTATAPH 7.374* 7.230* 7.469   ISTATAPCO2 61.5* 86.7* 49.1*   ISTATAPO2 20* 59* 56*   ISTATATCO2 38* 39* 37*   PYZTMPH6FHM 28* 83* 90*   ISTATARTHCO3 35.9* 36.3* 35.6*   ISTATARTBE 9* 6* 10*   ISTATTEMP 98.5 F 38.0 C 38.7 C   ISTATFIO2  --  80 50   ISTATSPEC Arterial Arterial Arterial   ISTATAPHTC 7.375* 7.216* 7.443   UMEBCQIU2YR 20* 63* 64         Recent Labs     08/07/23  0220 08/08/23  0430 08/09/23  0250   SODIUM 146* 145 147*   POTASSIUM 3.5* 3.7 3.2*   CHLORIDE 107 107 102   CO2 30 29 33   BUN 29* 30* 28*   CREATININE 0.76 0.78 0.68   MAGNESIUM 2.3 2.2 2.3   PHOSPHORUS 1.6* 1.9* 2.1*   CALCIUM 7.8* 8.7 8.2*     Recent Labs     08/07/23 0220 08/07/23  2253 08/08/23  0430 08/09/23  0250   ALTSGPT 80*  --  66* 183*   ASTSGOT 35  --  47* 260*    ALKPHOSPHAT 78  --  84 206*   TBILIRUBIN 0.7  --  0.7 3.7*   LIPASE  --  31  --   --    GLUCOSE 142*  --  142* 166*     Recent Labs     08/07/23  0220 08/08/23  0430 08/09/23  0250   WBC 10.6 13.3* 12.1*   ASTSGOT 35 47* 260*   ALTSGPT 80* 66* 183*   ALKPHOSPHAT 78 84 206*   TBILIRUBIN 0.7 0.7 3.7*     Recent Labs     08/07/23 0220 08/08/23  0430 08/09/23  0250   RBC 3.98* 4.42* 4.90   HEMOGLOBIN 11.0* 12.4* 13.5*   HEMATOCRIT 34.2* 38.8* 42.7   PLATELETCT 215 245 263       Imaging  X-Ray:  I have personally reviewed the images and compared with prior images.  CT:    Reviewed  Echo:   pending read    Assessment/Plan  #Acute pancreatitis 2/2 undetermined etiology (possible passed gallstone), no gall stones on MRCP  - MRCP negative for stones, CBD 3mm  - Triglycerides 137  - Lipase > 3000 on admission  - In ED Dr. Cordova recommended cholecystectomy during this stay after pancreatitis resolves  - LFTs increasing again today  Plan:  - Adequate pain control - continue IV pain meds since he pulled out his NGT  - Continue Zosyn for empiric coverage  - IVFs as needed, caution with tenuous respiratory status  - F/u RUQ for increasing LFTs, bili - cholelithiasis noted on US 8/3, patient unable to really localize abd pain     #Acute Hypoxic and Hypercapnic respiratory failure  #Aspiration  Plan:  - Continue Zosyn - c/f aspiration pna vs pneumonitis - if wbc continues to uptrend despite zosyn, will consider addition of vanc  - PRN lasix - need to be careful due to his pancreatitis and need for fluids earlier  - HOB elevation  - Aspiration precautions  - Titrate oxygen to an SpO2 of 88-94%  - Airway clearance as needed     #ICU Delirium  Plan:  - Doing much better today - will avoid any sedating medications and use non-pharmocologic means as much as possible   - Delirium precautions  - Frequent reorientation  - Patient usually sleeps during the day 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 - will try to allow  this to happen as much as possible here to help reorient him back to his normal schedule    #Leukocytosis 2/2 pancreatitis as well as possible aspiration pneumonia  - Debris in airway on CTA from 8/5  Plan:  - Continue Zosyn for now   - Blood cultures   - MRSA nares negative, vanc discontinued - if wbc continues to uptrend despite zosyn, will consider addition of vanc     #History of CVA with baseline right hemiparesis   Plan:  - Currently holding aspirin and statin due to NPO     #CKD III  Plan:  - Monitor UOP  - Avoid nephrotoxins as able  - Monitor renal function on daily labs     #Type II DM  Plan:  - Accuchecks AC/HS  - SSI insulin per scale  - Hold PO meds while in ICU  - Hypoglycemia protocol ordered     #History of HTN  Plan:  - Continue lisinopril and metoprolol when ok for PO  - PRN labetalol for now     #GERD  Plan:  - Continue pepcid      I have performed a physical exam and reviewed and updated ROS and Plan today (8/9/2023). In review of yesterday's note (8/8/2023), there are no changes except as documented above.     Discussed patient condition and risk of morbidity and/or mortality with Family, RN, RT, and Pharmacy  The patient remains critically ill.  Critical care time = 65 minutes in directly providing and coordinating critical care and extensive data review.  No time overlap and excludes procedures.        Yahaira Higuera MD  Pulmonary and Critical Care Medicine  [facility]

## 2023-08-09 NOTE — DISCHARGE PLANNING
Call placed for [facility] case manager to talk about this patient's   discharge-Waiting call back.

## 2023-08-09 NOTE — PROGRESS NOTES
Pt remains hypertensive despite PRN labetalol given. Dr Kyle notified, orders given to restart lisinopril and metoprolol.

## 2023-08-09 NOTE — CARE PLAN
The patient is Watcher - Medium risk of patient condition declining or worsening    Shift Goals  Clinical Goals: SpO2 >92%, wean oxygen, monitor respiratory status, Pt comfort  Patient Goals: ABIMBOLA  Family Goals: remain updated    Progress made toward(s) clinical / shift goals:    Problem: Pain - Standard  Goal: Alleviation of pain or a reduction in pain to the patient’s comfort goal  Outcome: Progressing     Problem: Respiratory  Goal: Patient will achieve/maintain optimum respiratory ventilation and gas exchange  Outcome: Progressing     Problem: Skin Integrity  Goal: Skin integrity is maintained or improved  Outcome: Progressing     Problem: Fall Risk  Goal: Patient will remain free from falls  Outcome: Progressing       Patient is not progressing towards the following goals:

## 2023-08-09 NOTE — THERAPY
Speech Language Pathology   Clinical Swallow Evaluation     Patient Name: Kobe Spear  AGE:  63 y.o., SEX:  male  Medical Record #: [id number]  Date of Service: 8/9/2023      History of Present Illness  Pt admitted 8/3 with abdominal pain while here from [place] visiting with family. Found to have acute pancreatitis w/out infection and symptomatic cholelithiasis. Transferred to ICU the night of 8/5 d/t escalating WBC count respiratory failure w/ possible aspiration.     PMHx of HTN, diabetes, HLD, CVA w/ residual R-sided weakness. No previous SLP notes found in EMR.     Chest CT 8/5:    1. No pulmonary embolus.  2. Mild bibasilar airspace disease, atelectasis versus infiltrate  3. Small simple right pleural effusion.  4. Mild ascites in the upper quadrant.  5. Minimal subsegmental atelectasis in upper lung zones.    CXR 8/8:  Groundglass and airspace opacities bilaterally, slightly worse than prior.    General Information:  Vitals  O2 (LPM): 25  O2 Delivery Device: Heated High Flow Nasal Cannula  Level of Consciousness: Awake  Patient Behaviors: Fatigue  Follows Directives: Yes - simple commands only    Prior Living Situation & Level of Function:  Prior Services: intermediate Home Aide Services  Lives with - Patient's Self Care Capacity: Spouse  Communication: Impaired; wife reports hx of expressive aphasia since CVAs in 2013  Swallowing: WFL per wife report; she reports no difficulty even after CVAs    Oral Mechanism Evaluation:  Dentition: Good, Natural dentition   Facial Symmetry: Equal  Facial Sensation: Equal     Labial Observations: Open mouth posture, WFL   Lingual Observations: Midline  Motor Speech: WFL    Laryngeal Function:  Secretion Management: Adequate  Voice Quality: WFL, Other (see comments) (Decreased intensity, but able to improve with cues)  Max Phonation Time (seconds): 5  Cough: Perceptually WNL    Subjective  RN cleared patient for clinical swallow evaluation. Patient received in bed,  awake, and with wife (Stephanie) and live sitter present at bedside. Wife appeared supported and assisted in providing PLS and PLOF information, as patient has some expressive aphasia at baseline since CVAs in 2013.    Assessment  Current Method of Nutrition: NGT  Positioning: Inman's (60-90 degrees)  Bolus Administration: SLP  O2 (LPM): 25 O2 Delivery Device: Heated High Flow Nasal Cannula  Factor(s) Affecting Performance: Impaired endurance, Impaired mental status, Impaired command following  Tracheostomy : No    Swallowing Trials:  Swallowing Trials  Ice: WFL  Thin Liquid (TN0): WFL  Liquidised (LQ3): WFL    Comments: Patient initially awake, alert, and with short utterances, consistent with known hx of expressive aphasia (per wife report) from previous CVAs. Patient able to follow simple commands for oral motor evaluation. No gross deficits noted with the exception of generalized weakness. Vocal quality clear and strong. Natural dentition intact. Patient and wife denied any hx of dysphagia or s/sx of aspiration despite previous CVAs. RT in during evaluation to lower O2 to 25L via HHFNC at 30% FiO2.     Patient consumed PO trials of single ice chips, thins via tsp and cup sip, and liquidized purees. Patient presented with appropriate bolus acceptance and containment. Mastication slow, but functional. Initiation of swallow trigger mildly delayed to palpation. No overt s/sx of aspiration on any textures given. Patient became increasingly fatigued as evaluation progressed and endorsed same when asked. No further PO trials were given d/t same.     Clinical Impressions  Patient presents with oropharyngeal dysphagia, suspected to be acute on chronic related to fatigue, debility, generalized weakness, and history of CVAs with right-sided weakness. Patient's largest barrier to PO intake appears to be fatigue. Recommend patient continue NPO/NGT (ok for 10 ice chips per hour after oral care and when sitting up, awake, and  alert; wife ok to give from SLP standpoint). Patient will likely need FEES prior to PO intake to further evaluate oropharyngeal swallow function and r/o aspiration, but is too fatigued today. SLP to follow tomorrow as appropriate for further trials and will complete FEES when able/appropriate for same; patient and wife educated on SLP recs and in agreement.     Recommendations  Diet Consistency: NPO/NGT; Ok for 10 ice chips per hour  Instrumentation: FEES (When appropriate; too fatigued today)  Medication: Non Oral  Oral Care: Q4h     SLP Treatment Plan  Treatment Plan: Dysphagia Treatment  SLP Frequency: 3x Per Week  Estimated Duration: Until Therapy Goals Met    Anticipated Discharge Needs  Discharge Recommendations: Other (Pending progress and FEES results when completed)   Therapy Recommendations Upon DC: Dysphagia Training, Community Re-Integration, Patient / Family / Caregiver Education      Patient / Family Goals  Patient / Family Goal #1: Pt nodded head to being asked if he was hungry and thirsty  Short Term Goals  Short Term Goal # 1: Patient will consume prefeeding trials with SLP only with no overt s/sx of aspiration or decline in respiratory status.    Taryn Maxwell, SLP

## 2023-08-09 NOTE — PROGRESS NOTES
12-hour chart check complete.    Monitor Summary  Rhythm: SR  Rate: 88-99  Ectopy: Rpvc, Rpac, Rtrigem  Measurements: 0.14/0.08/0.36

## 2023-08-09 NOTE — DIETARY
"Nutrition Support Assessment:  Day 6 of admit.  Kobe Spear is a 63 y.o. male with admitting DX of Acute pancreatitis      Current problem list:  H/o CVA  GERD  Type 2 DM   Stage 3 CKD  Anxiety      Assessment:  Estimated Nutritional Needs based on:   Height: 175.3 cm (5' 9\")  Weight: 106 kg (233 lb 14.5 oz)  Body mass index is 34.54 kg/m²., BMI classification: obese  IBW: 72.7 kg (160 lb)    Calculation/Equation: REE: 1839 kcal per MSJ  REE x 1.0 - 1.2 = 1839 - 2206 kcal  Calories: 1850 - 2035 kcal/day ( 25 - 28 kcal/kg IBW)   Protein: 109 - 145 grams/day (  1.5 - 2.0 gram/kg IBW)     Evaluation:   Indication for nutrition support per SLP:   \"Patient presents with oropharyngeal dysphagia...Patient's largest barrier to PO intake appears to be fatigue. Recommend patient continue NPO/NGT \"  NG tube Enteral access into fundus of the stomach per MD on 8/8/23  Labs (8/9/23): Na+ 147, K+ 3.2, BUN 28, Phos 2.1, Mag 2.3, POC glucose 129-190.   Medications: Pepcid, sliding scale insulin, Zosyn, NS infusion (continuous), antiemetics (PRN), bowel regimen (PRN) , Klyte (completed), lasix (completed), K phosphate (completed)  Fluid accumulation: trace edema of RLE + LLE per flowsheet         Recommendations/Plan:  Continuous TF Impact Peptide 1.5 kcal formula at goal rate of 55 mL/hour providing 1980 kcal/day (27 kcal/kg IBW), 124 grams of protein/day (1.7 grams/kg IBW), and 1016 mL of FW/day.   Start TF at 20 mL/hour and advance by 20 mL daily to reach goal in 2 days.  Continue to monitor for refeeding: Order BMP w/ Mg and Phos x 7 days. Replete K, Phos and Mg prn. Supplement 100 mg Thiamine x 7 days to reduce risk of refeeding. Collaborated with MD to review above recommendations   Continue to monitor renal function   Maintain blood glucose 100 - 180 during hospitalization   Monitor wt     RD following            "

## 2023-08-10 NOTE — CARE PLAN
Problem: Ventilation  Goal: Ability to achieve and maintain unassisted ventilation or tolerate decreased levels of ventilator support  Description: Target End Date:  4 days     Document on Vent flowsheet    1.  Support and monitor invasive and noninvasive mechanical ventilation  2.  Monitor ventilator weaning response  3.  Perform ventilator associated pneumonia prevention interventions  4.  Manage ventilation therapy by monitoring diagnostic test results  Outcome: Progressing     Problem: Humidified High Flow Nasal Cannula  Goal: Maintain adequate oxygenation dependent on patient condition  Description: Target End Date:  resolve prior to discharge or when underlying condition is resolved/stabilized    1.  Implement humidified high flow oxygen therapy  2.  Titrate high flow oxygen to maintain appropriate SpO2  Outcome: Progressing

## 2023-08-10 NOTE — PROGRESS NOTES
"Critical Care Progress Note    Date of admission  8/3/2023    Chief Complaint  63 y.o. male admitted 8/3/2023 with abdominal pain    Hospital Course  \"63 y.o. male who presented 8/3/2023 with a history of CVA x 5 (2013), CKD stage III, DM, GERD who is reportedly here visiting family and was admitted with severe abdominal pain.  General surgery was consulted and recommended MRCP which was negative for stones.  He has been managed on GSU for his pain and was started on ceftriaxone/metronidazole 8/4 which was escalated to Vanc and zosyn 8/5 overnight with ICU upgrade.  He is unfortunately unable to provide much history due to his strokes.    WBC count has been escalating. AST/ALT are improving, as are the lipase and lactate.  Troponin increased to 120 from baseline of 16.  This morning patient is complaining of severe pain, grimacing but states that the pain is the same as yesterday and not worsening.  He remains strong in his extremities.  His blood gas has improved significantly overnight but he remains a high risk of worsening respiratory failure and possible intubation.\" Dr. Gastelum    8/6: \"Patient again became significantly agitated with tachypnea and tachycardia early this morning.  During this episode of delirium he intermittently desaturated and was uncooperative with supplemental oxygen.  Dr. Kyle reached out to me around 6am with concerns regarding his agitation and respiratory condition and we discussed some ideas for calming him with pain medication and precedex.  He was given dilaudid 0.5mg x 2, ativan x 1 and then placed on a precedex gtt which managed to calm him down. Once calm, his saturations improved and his blood gas this morning looked excellent.  This episode was discussed with his wife and we will try having her come in early tomorrow morning to see if that will mitigate some of his ICU delirium.\" Dr. Gastelum  8/7: on precedex, alert but not oriented at all. Intermittently agitated stating he's " "in pain and then he feels scared and panic. Wife bedside to help reorient him but still unable to really understand her.  8/8: overnight, became very somnolent, non-responsive and w/worsening respiratory failure so she was started on bipap after ABG showed respiratory acidosis. This AM, doing better on bipap, able to look at me and mouthing words. Failed attempt to remove off bipap so left it on and let him rest. This afternoon, he is awake on bipap, following commands, calm and resting in bed.   8/9: calm, alert. On HFNC. Hypertensive. \" From Dr. Higuera's note    8/10: Alert, awake and oriented. Calm. On 4 l facemask    Interval Problem Update  Reviewed last 24 hour events:    Cardiac: HR 80s, BP 170s,  lisinopril, metop, added clonidine  Pulm:  4 l NC 96%  Heme: stable  /renal: wnl  GI/endo: TF as still tachypneac  ID:  WBC 12.4, Zosyn 8/5, Bcx NGTD, MRSA/covid/flu/rsv neg  I/O: -2.4L  Additional Labs/Imaging:    - CXR 8/8 w/low lung volumes and worse infiltrates in R side c/f aspiration pneumonia vs pneumonitis - agree with findings   - 8/9 US + gall stones but GBD not dilated. AST imrpoving and alk phos also improving. No pain       Review of Systems  Review of Systems   Unable to perform ROS: Medical condition        Vital Signs for last 24 hours   Temp:  [37.7 °C (99.9 °F)-37.9 °C (100.2 °F)] 37.7 °C (99.9 °F)  Pulse:  [] 89  Resp:  [] 25  BP: (147-190)/() 182/83  SpO2:  [90 %-96 %] 95 %          Physical Exam   Physical Exam  Constitutional:       Appearance: He is ill-appearing.   HENT:      Head: Atraumatic.      Mouth/Throat:      Mouth: Mucous membranes are dry.   Eyes:      Extraocular Movements: Extraocular movements intact.   Cardiovascular:      Rate and Rhythm: Normal rate and regular rhythm.      Heart sounds: Normal heart sounds.   Pulmonary:      Effort: Pulmonary effort is normal. No respiratory distress.      Breath sounds: Rales present. No wheezing.   Abdominal:      " Palpations: Abdomen is soft.      Tenderness: There is no abdominal tenderness.   Musculoskeletal:         General: No swelling, tenderness or deformity.   Skin:     General: Skin is warm.   Neurological:      Comments: Awake, calm, alert and having full conversations         Medications  Current Facility-Administered Medications   Medication Dose Route Frequency Provider Last Rate Last Admin    potassium chloride (Kcl) ivpb 10 mEq  10 mEq Intravenous Q HOUR Yahaira Higuera M.D. 100 mL/hr at 08/10/23 0816 10 mEq at 08/10/23 0816    calcium GLUConate 1 g in NaCl IVPB premix  1 g Intravenous Once Yahaira Higuera M.D.        famotidine (Pepcid) tablet 20 mg  20 mg Oral BID Ambreen Soria D.O.        Or    famotidine (Pepcid) injection 20 mg  20 mg Intravenous BID VISHNU TorrezONatasha        [START ON 8/11/2023] lisinopril (Prinivil) tablet 20 mg  20 mg Oral Q DAY Ambreen Soria D.O.        metoprolol tartrate (Lopressor) tablet 25 mg  25 mg Oral TWICE DAILY VISHNU TorrezONatasha        [START ON 8/11/2023] thiamine (Vitamin B-1) tablet 100 mg  100 mg Oral DAILY Ambreen Soria D.O.        acetaminophen (Tylenol) tablet 650 mg  650 mg Oral Q4HRS PRN VISHNU TorrezONatasha        hydrALAZINE (Apresoline) injection 10 mg  10 mg Intravenous Q6HRS PRN Yahaira Higuera M.D.   10 mg at 08/10/23 0454    bisacodyl (Dulcolax) suppository 10 mg  10 mg Rectal QDAY PRN Inge Norman M.D.        HYDROmorphone (Dilaudid) injection 1 mg  1 mg Intravenous Q2HRS PRN Yahaira Higuera M.D.   1 mg at 08/09/23 0423    MD Alert...ICU Electrolyte Replacement per Pharmacy   Other PHARMACY TO DOSE Yahaira Higuera M.D.        ipratropium-albuterol (DUONEB) nebulizer solution  3 mL Nebulization Q4H PRN (RT) Yahaira Davida, M.D.        Respiratory Therapy Consult   Nebulization Continuous RT Inge Norman M.D.        piperacillin-tazobactam (Zosyn) 3.375 g in  mL IVPB  3.375 g Intravenous  Q8HRS Yahaira Higuera M.D. 25 mL/hr at 08/10/23 0449 3.375 g at 08/10/23 0449    labetalol (Normodyne/Trandate) injection 10 mg  10 mg Intravenous Q4HRS PRN Yazmin Gastelum M.D.   10 mg at 08/10/23 0805    NS infusion   Intravenous Continuous Yazmin Gastelum M.D.   Stopped at 08/05/23 2144    Pharmacy Consult Request ...Pain Management Review 1 Each  1 Each Other PHARMACY TO DOSE Paco Munoz M.D.        insulin regular (HumuLIN R,NovoLIN R) injection  1-6 Units Subcutaneous 4X/DAY ACHS Paco Munoz M.D.   1 Units at 08/10/23 0550    And    dextrose 50% (D50W) injection 25 g  25 g Intravenous Q15 MIN PRN Paco Munoz M.D.        heparin injection 5,000 Units  5,000 Units Subcutaneous Q8HRS Paco Munoz M.D.   5,000 Units at 08/10/23 0552    ondansetron (Zofran) syringe/vial injection 4 mg  4 mg Intravenous Q4HRS PRN Inge Norman M.D.   4 mg at 08/04/23 0009    prochlorperazine (Compazine) injection 10 mg  10 mg Intravenous Q6HRS PRN Inge Norman M.D.           Fluids    Intake/Output Summary (Last 24 hours) at 8/10/2023 0821  Last data filed at 8/10/2023 0800  Gross per 24 hour   Intake 570.1 ml   Output 1255 ml   Net -684.9 ml         Laboratory  Recent Labs     08/07/23  1107 08/07/23  2207 08/08/23  0120   ISTATAPH 7.374* 7.230* 7.469   ISTATAPCO2 61.5* 86.7* 49.1*   ISTATAPO2 20* 59* 56*   ISTATATCO2 38* 39* 37*   VHEETBG8EYM 28* 83* 90*   ISTATARTHCO3 35.9* 36.3* 35.6*   ISTATARTBE 9* 6* 10*   ISTATTEMP 98.5 F 38.0 C 38.7 C   ISTATFIO2  --  80 50   ISTATSPEC Arterial Arterial Arterial   ISTATAPHTC 7.375* 7.216* 7.443   GGMYBFOS9DJ 20* 63* 64           Recent Labs     08/08/23  0430 08/09/23  0250 08/10/23  0330   SODIUM 145 147* 149*   POTASSIUM 3.7 3.2* 3.4*   CHLORIDE 107 102 106   CO2 29 33 31   BUN 30* 28* 29*   CREATININE 0.78 0.68 0.68   MAGNESIUM 2.2 2.3 2.4   PHOSPHORUS 1.9* 2.1* 2.7   CALCIUM 8.7 8.2* 8.4       Recent Labs     08/07/23  2253 08/08/23  0430  08/09/23  0250 08/10/23  0330   ALTSGPT  --  66* 183* 189*   ASTSGOT  --  47* 260* 162*   ALKPHOSPHAT  --  84 206* 186*   TBILIRUBIN  --  0.7 3.7* 1.4   LIPASE 31  --   --   --    GLUCOSE  --  142* 166* 181*       Recent Labs     08/08/23  0430 08/09/23  0250 08/10/23  0330   WBC 13.3* 12.1* 12.4*   ASTSGOT 47* 260* 162*   ALTSGPT 66* 183* 189*   ALKPHOSPHAT 84 206* 186*   TBILIRUBIN 0.7 3.7* 1.4       Recent Labs     08/08/23  0430 08/09/23  0250 08/10/23  0330   RBC 4.42* 4.90 5.14   HEMOGLOBIN 12.4* 13.5* 13.9*   HEMATOCRIT 38.8* 42.7 44.5   PLATELETCT 245 263 283         Imaging  ECHO done on 8/6 Normal EF    Assessment/Plan  #Acute pancreatitis 2/2 undetermined etiology (possible passed gallstone), no gall stones on MRCP  - MRCP negative for stones, CBD 3mm  - US repeated 8/9 no enlargement of CBD AST trending down and alk phos down  - Triglycerides 137  - Lipase > 3000 on admission  - In ED Dr. Cordova recommended cholecystectomy during this stay after pancreatitis resolves     Plan:  - Adequate pain control - continue IV pain meds since he pulled out his NGT  - Continue Zosyn for empiric coverage which will also cover for aspiration  - Pt pain free - wife would prefer to head back to [facility] if no need for emergent surgery     #Acute Hypoxic and Hypercapnic respiratory failure  #Aspiration  Plan:  - Continue Zosyn - c/f aspiration pna vs pneumonitis    - - 4 l since admit  - prn lasux  - HOB elevation  - Aspiration precautions  - Titrate oxygen to an SpO2 of 88-94%  - Airway clearance as needed     #ICU Delirium  Plan:  - resolved     #Leukocytosis 2/2 pancreatitis as well as possible aspiration pneumonia  - Debris in airway on CTA from 8/5  Plan:  - Continue Zosyn for now to complete 5 days   - Blood cultures negative  - MRSA nares negative      #History of CVA with baseline right hemiparesis   Plan:  - Aspirin and statin can go through NG tube     #CKD III  Plan:  - Monitor UOP  - Avoid  nephrotoxins as able  - Monitor renal function on daily labs     #Type II DM  Plan:  - Accuchecks AC/HS  - SSI insulin per scale  - Hold PO meds while in ICU  - Hypoglycemia protocol ordered     #History of HTN  Plan:  - Continue lisinopril and metoprolol added clonidine per wife his bp has fluctuated   - PRN labetalol for now     #GERD  Plan:  - Continue pepcid      I have performed a physical exam and reviewed and updated ROS and Plan today (8/10/2023). In review of yesterday's note (8/9/2023), there are no changes except as documented above.     Discussed patient condition and risk of morbidity and/or mortality with Family, RN, RT, and Pharmacy  The patient remains critically ill.  Critical care time = 35minutes in directly providing and coordinating critical care and extensive data review.  No time overlap and excludes procedures.

## 2023-08-10 NOTE — THERAPY
Speech Language Pathology   Daily Treatment     Patient Name: Koeb Spear  AGE:  63 y.o., SEX:  male  Medical Record #: [id number]  Date of Service: 8/10/2023      Precautions:  Precautions: Fall Risk, Swallow Precautions, Nasogastric Tube    Subjective  RN cleared patient for dysphagia tx session. RN reported patient didn't sleep well last night and is fatigued, but that O2 needs and mentation appear improved from yesterday.     Assessment  Patient NPO with NGT and reported fatigue, but agreeable and still eager for PO trials despite this. Patient with increased verbal output today, which is improvement from yesterday's evaluation. Patient on 15L O2 via HFNC at 40% FiO2, which is also improvement from yesterday. Patient consumed PO trials of ice and thins via cup sip. Patient presented with appropriate bolus acceptance and containment. Initiation of pharyngeal swallow appeared timely to palpation. Patient was able to complete ~5 reps of effortful swallow during PO trials. No overt s/sx of aspiration noted on any trials, but patient did fatigue and eventually fell asleep by end of session.     Clinical Impressions  Patient presents with decreased s/sx of aspiration on PO trials today, but endurance and fatigue are still the largest barriers to PO intake and appropriateness for FEES study to further evaluate oropharyngeal swallow function and r/o aspiration. Patient would benefit from remaining NPO/NGT w/ SLP f/u tomorrow to determine if FEES study is appropriate. Patient educated on SLP recs and is agreeable to SLP POC. Ok for 10 ice chips per hour when awake, alert, and sitting up, and after oral care. Wife ok to give ice chips as well from SLP standpoint.     Recommendations  Treatment Completed: Dysphagia Treatment     Dysphagia Treatment  Diet Consistency: NPO/NGT; ok for 10 ice chips per hour  Instrumentation: FEES  Medication: Non Oral  Oral Care: Q4h    SLP Treatment Plan  Treatment Plan: Dysphagia  Treatment  SLP Frequency: 3x Per Week  Estimated Duration: Until Therapy Goals Met    Anticipated Discharge Needs  Discharge Recommendations: Recommend post-acute placement for additional speech therapy services prior to discharge home  Therapy Recommendations Upon DC: Dysphagia Training, Community Re-Integration, Patient / Family / Caregiver Education    Patient / Family Goals  Patient / Family Goal #1: Pt nodded head to being asked if he was hungry and thirsty  Goal #1 Outcome: Progressing slower than expected  Short Term Goals  Short Term Goal # 1: Patient will consume prefeeding trials with SLP only with no overt s/sx of aspiration or decline in respiratory status.  Goal Outcome # 1: Progressing slower than expected    Taryn Maxwlel, SLP

## 2023-08-10 NOTE — PROGRESS NOTES
12-hour chart check complete.    Monitor Summary  Rhythm: SR  Rate: 90s  Ectopy: none  Measurements: 0.16/0.08/0.34

## 2023-08-10 NOTE — PROGRESS NOTES
12-hour chart check complete.    Monitor Summary  Rhythm: SR  Rate: 71-90   Ectopy: Rare PAC/PVC   Measurements: 0.16/0.08/0.40

## 2023-08-10 NOTE — CARE PLAN
The patient is Watcher - Medium risk of patient condition declining or worsening    Shift Goals  Clinical Goals: wean O2 down, decrease agitation, sleep  Patient Goals: rest  Family Goals: ABIMBOLA    Progress made toward(s) clinical / shift goals:    Problem: Fluid Volume  Goal: Fluid volume balance will be maintained  Outcome: Progressing     Problem: Respiratory  Goal: Patient will achieve/maintain optimum respiratory ventilation and gas exchange  8/10/2023 0508 by Mackenzie Sellers R.N.  Outcome: Progressing  8/10/2023 0242 by Mackenzie Sellers R.N.  Outcome: Progressing     Problem: Psychosocial  Goal: Patient's level of anxiety will decrease  Outcome: Progressing       Patient is not progressing towards the following goals:      Problem: Hemodynamics  Goal: Patient's hemodynamics, fluid balance and neurologic status will be stable or improve  Outcome: Not Progressing    Patient remained hypertensive throughout shift with -180s and up to 196. Patient given prn hydralazine three times and prn labetalol.

## 2023-08-10 NOTE — PROGRESS NOTES
12-hour chart check complete.    Monitor Summary  Rhythm: SR  Rate: 81-94  Ectopy: rPVCs, rPACs  Measurements: 0.14/0.08/0.38

## 2023-08-11 NOTE — PROGRESS NOTES
4 Eyes Skin Assessment Completed by PETE Parham and Joceline FREEMAN.     Head WDL  Ears WDL  Nose WDL  Mouth WDL  Neck WDL  Breast/Chest WDL  Shoulder Blades WDL  Spine WDL  (R) Arm/Elbow/Hand Bruising  (L) Arm/Elbow/Hand Bruising and Abrasion  Abdomen Bruising  Groin WDL  Scrotum/Coccyx/Buttocks Redness and Blanching  (R) Leg mottling to knee  (L) Leg mottling to knee  (R) Heel/Foot/Toe WDL  (L) Heel/Foot/Toe WDL              Devices In Places ECG, Blood Pressure Cuff, Pulse Ox, and Bipap        Interventions In Place Bipap Protecta-Gel, TAP System, Q2 Turns, Low Air Loss Mattress, and Heels Loaded W/Pillows     Possible Skin Injury No     Pictures Uploaded Into Epic N/A  Wound Consult Placed N/A  RN Wound Prevention Protocol Ordered No

## 2023-08-11 NOTE — PROGRESS NOTES
Hospital Medicine Daily Progress Note    Date of Service  8/11/2023    Chief Complaint  Kobe Spear is a 63 y.o. male admitted 8/3/2023 with abdominal pain.    Hospital Course  History of CVA, CKD stage III, DM2, GERD who is traveling through [place] and developed sudden onset severe abdominal pain and presented to the ER where he was found to have markedly elevated LFTs and lipase highly suggestive of gallstone pancreatitis.  Right upper quadrant ultrasound revealed cholelithiasis however no CBD dilation.  General surgery was consulted and recommended MRCP.  He was started on empiric IV antibiotic therapy due to marked leukocytosis .  MRCP showed no evidence of choledocholithiasis only cholelithiasis.  And confirmed acute pancreatitis.  Patient became markedly agitated and was upgraded to the ICU so Precedex could be used.  He was in the ICU 8/5 through 8/10.  He also had respiratory failure secondary to the agitation and required BiPAP high flow nasal cannula.  He has been weaned from this and is now down to 4 L nasal cannula and saturating well.  He remains very anxious and constantly moving and thus wife is at bedside.    Interval Problem Update  8/11 patient anxious and required restraints overnight.  Seen with wife at bedside.  Patient has no abdominal pain and is tolerating NG tube feeds well.  He is very anxious and tachypneic at time of examination however with repositioning his tachypnea resolved.  He told me he wanted to try and eat cheese today.  He is not having any neurologic changes and the right upper extremity weakness is the same per wife at bedside.  Leukocytosis persists and patient is having elevated temperature.  He had blood cultures done on 8/6 which came back negative.  I will get a chest x-ray as well as repeat blood cultures at this time.  Addendum:  patient with decompensation and higher temp, hr and RR this afternoon.  CXR stable, concern he is again septic.  Blood cultures, lactic  acid, and UA pending.  Discussed with Dr Campa, low threshold to place back in the ICU.  I've added zyvox for broader coverage and will continue with zosyn.      I have discussed this patient's plan of care and discharge plan at IDT rounds today with Case Management, Nursing, Nursing leadership, and other members of the IDT team.    Consultants/Specialty  none    Code Status  Full Code    Disposition  The patient is not medically cleared for discharge to home or a post-acute facility.  Anticipate discharge to: skilled nursing facility    I have placed the appropriate orders for post-discharge needs.    Review of Systems  Review of Systems   Constitutional:  Negative for chills and fever.   HENT:  Negative for congestion and sore throat.    Eyes:  Negative for blurred vision and photophobia.   Respiratory:  Negative for cough and shortness of breath.    Cardiovascular:  Negative for chest pain, claudication and leg swelling.   Gastrointestinal:  Negative for abdominal pain, constipation, diarrhea, heartburn and vomiting.   Genitourinary:  Negative for dysuria and hematuria.   Musculoskeletal:  Negative for joint pain and myalgias.   Skin:  Negative for itching and rash.   Neurological:  Negative for dizziness, sensory change, speech change, weakness and headaches.   Psychiatric/Behavioral:  Negative for depression. The patient is nervous/anxious. The patient does not have insomnia.         Physical Exam  Temp:  [36.8 °C (98.3 °F)-38.1 °C (100.5 °F)] 38.1 °C (100.5 °F)  Pulse:  [] 107  Resp:  [16-33] 18  BP: (158-192)/() 189/74  SpO2:  [95 %-97 %] 95 %    Physical Exam  Vitals and nursing note reviewed.   Constitutional:       General: He is not in acute distress.     Appearance: Normal appearance.   HENT:      Head: Normocephalic and atraumatic.   Eyes:      General: No scleral icterus.     Extraocular Movements: Extraocular movements intact.   Cardiovascular:      Rate and Rhythm: Normal rate and  regular rhythm.      Pulses: Normal pulses.      Heart sounds: Normal heart sounds. No murmur heard.  Pulmonary:      Effort: Pulmonary effort is normal. No respiratory distress.      Breath sounds: Normal breath sounds. No wheezing, rhonchi or rales.      Comments: Tachypneic when anxious, breathing slows when he calms down  Abdominal:      General: Abdomen is flat. Bowel sounds are normal. There is no distension.      Palpations: Abdomen is soft.      Tenderness: There is no abdominal tenderness. There is no guarding or rebound.   Musculoskeletal:         General: No swelling or tenderness.      Cervical back: Normal range of motion and neck supple.   Lymphadenopathy:      Cervical: No cervical adenopathy.   Skin:     Coloration: Skin is not jaundiced.      Findings: No erythema.   Neurological:      General: No focal deficit present.      Mental Status: He is alert and oriented to person, place, and time. Mental status is at baseline.      Cranial Nerves: No cranial nerve deficit.      Motor: Weakness (Right-sided arm hemiparesis) present.   Psychiatric:         Mood and Affect: Mood normal.      Comments: Confused, difficult to orient unless wife at bedside         Fluids    Intake/Output Summary (Last 24 hours) at 8/11/2023 1309  Last data filed at 8/11/2023 1125  Gross per 24 hour   Intake 484.56 ml   Output 755 ml   Net -270.44 ml       Laboratory  Recent Labs     08/09/23  0250 08/10/23  0330 08/11/23  0135   WBC 12.1* 12.4* 13.1*   RBC 4.90 5.14 4.85   HEMOGLOBIN 13.5* 13.9* 13.3*   HEMATOCRIT 42.7 44.5 42.9   MCV 87.1 86.6 88.5   MCH 27.6 27.0 27.4   MCHC 31.6* 31.2* 31.0*   RDW 47.6 48.0 50.8*   PLATELETCT 263 283 307   MPV 10.0 10.0 10.7     Recent Labs     08/09/23  0250 08/10/23  0330 08/11/23  0135   SODIUM 147* 149* 148*   POTASSIUM 3.2* 3.4* 3.5*   CHLORIDE 102 106 109   CO2 33 31 29   GLUCOSE 166* 181* 203*   BUN 28* 29* 28*   CREATININE 0.68 0.68 0.78   CALCIUM 8.2* 8.4 8.2*                    Imaging  [id number] VIEW   Final Result      NG tube tip overlies the gastric antrum.      DX-ABDOMEN FOR TUBE PLACEMENT   Final Result      Malpositioned nasogastric tube. The tube extends to the gastroesophageal junction and then reverses course with tip located within the upper thoracic esophagus.      A report was called to the patient's ICU nurse at 2:55 PM on 8/9/2023      US-RUQ   Final Result      1.  Gallstones within the gallbladder. No evidence of biliary ductal dilatation.      2.  The liver is echogenic consistent with fatty change versus hepatocellular dysfunction.      DX-ABDOMEN FOR TUBE PLACEMENT   Final Result      1.  NG tube courses into the fundus of the stomach      DX-CHEST-LIMITED (1 VIEW)   Final Result         Groundglass and airspace opacities bilaterally, slightly worse than prior      DX-CHEST-PORTABLE (1 VIEW)   Final Result      1.  Increased patchy bilateral airspace opacities.   2.  Probable small bilateral pleural effusions.   3.  Interval placement of an enteric feeding tube which courses towards the stomach with the distal tip not visualized.   4.  Stable enlargement of the cardiomediastinal silhouette.      DX-ABDOMEN FOR TUBE PLACEMENT   Final Result      NG tube tip overlies the gastric body.      EC-ECHOCARDIOGRAM COMPLETE W/ CONT   Final Result      DX-CHEST-PORTABLE (1 VIEW)   Final Result      Bibasilar atelectasis and small bilateral pleural effusions, similar to prior study.         CT-CTA CHEST PULMONARY ARTERY W/ RECONS   Final Result         1. No pulmonary embolus.   2. Mild bibasilar airspace disease, atelectasis versus infiltrate   3. Small simple right pleural effusion.   4. Mild ascites in the upper quadrant.   5. Minimal subsegmental atelectasis in upper lung zones.            DX-CHEST-PORTABLE (1 VIEW)   Final Result      No significant change from prior exam.      GG-FPUROWN-N/O   Final Result         1. No choledocholithiasis. No CBD dilatation.      2.  Cholelithiasis.      3. Fluid surrounding the pancreas, in keeping with acute pancreatitis.      US-RUQ   Final Result      1.  Hepatic steatosis.      2.  Cholelithiasis.      CT-ABDOMEN-PELVIS WITH   Final Result         1.  Cholelithiasis.      2.  Marked acute pancreatitis      DX-CHEST-PORTABLE (1 VIEW)   Final Result      Hypoinflation with borderline cardiomegaly. No lobar consolidation or large pleural effusions.              Assessment/Plan  * Acute biliary pancreatitis without infection or necrosis- (present on admission)  Assessment & Plan  Patient without any further abdominal pain.  Is taking in nutrition via tube feeds through his NG tube as he is not yet safe to swallow.  Last lipase were checked on 8/7 and was 31.    History of CVA (cerebrovascular accident)  Assessment & Plan  Residual right arm hemiparesis  Once he is taking p.o., resume statin, aspirin  Please caution with mobilizing  PT OT to continue    Stage 3a chronic kidney disease (HCC)- (present on admission)  Assessment & Plan  Avoid/minimize nephrotoxins as much as possible, renally dose medications, monitor inputs and outputs     Type 2 diabetes mellitus with kidney complication, without long-term current use of insulin (HCC)- (present on admission)  Assessment & Plan  Sliding scale insulin as needed      Leukocytosis- (present on admission)  Assessment & Plan  WBC count 13.1  Day 7 of Zosyn today  Check procalcitonin tomorrow    Gastroesophageal reflux disease without esophagitis- (present on admission)  Assessment & Plan  Resume omeprazole    Anxiety- (present on admission)  Assessment & Plan  Resume fluoxetine via NG tube  The acute withdrawal of this medication could be contributing to some of his confusion.      Symptomatic cholelithiasis- (present on admission)  Assessment & Plan  Patient will eventually need laparoscopic cholecystectomy however patient's wife wants to get patient back to [place] for any type of surgical  intervention as long as it safe.  Patient is pain-free in the right upper quadrant.         VTE prophylaxis:   SCDs/TEDs      I have performed a physical exam and reviewed and updated ROS and Plan today (8/11/2023). In review of yesterday's note (8/10/2023), there are no changes except as documented above.

## 2023-08-11 NOTE — PROGRESS NOTES
On call provider notified of pt continuous attempts to get out of bed. Bed alarm in place with tele sitter. Pt continues to be restless and impulsive. Pt kicking at staff and swinging legs over bed rails. Pt pulling at oxygen, park catheter, and NG tube. Pt placed in soft wrist restraints. Orders placed by physician.

## 2023-08-11 NOTE — THERAPY
"Speech Language Pathology   Daily Treatment     Patient Name: Kobe Spear  AGE:  63 y.o., SEX:  male  Medical Record #: [id number]  Date of Service: 8/11/2023      Precautions:  Precautions: Fall Risk, Swallow Precautions, Nasogastric Tube    Subjective  RN cleared patient for dysphagia tx. Patient received in bed, anxious, and restless. Telesitter advising patient to stay in bed. He was repositioned with student RN and this clinician. RN reports she recently gave Ativan to help calm him.     Assessment  Patient transferred out of ICU and weaned off HFNC since last session yesterday. Patient agreeable and reported desire for PO trials, requesting \"bananas.\" Patient consumed PO trials of single ice chips only today. Patient presented with reduced bolus acceptance, requiring verbal cues to open mouth and consume ice chips. Once in oral cavity, bolus containment and mastication were appropriate. Initiation of pharyngeal swallow was minimally delayed. No s/sx of aspiration noted on any textures trialed. Patient fatigued quickly and fell asleep by end of session.     Clinical Impressions  Patient presents with suspected oropharyngeal dysphagia, likely acute on chronic, related to pancreatitis, ARF, hx of CVAs, and generalized weakness and debility. Patient will need FEES to further evaluate oropharyngeal swallow study when appropriate (less anxious and less fatigued). For now, continue NPO/NGT. Patient ok for <10 ice chips per hour after oral care and when awake and alert.     Recommendations  Treatment Completed: Dysphagia Treatment    Dysphagia Treatment  Diet Consistency: NPO/NGT (ok for <10 ice chips per hour after oral care and when awake and alert)  Instrumentation: FEES (When appropriate)  Medication: Non Oral  Oral Care: Q4h    SLP Treatment Plan  Treatment Plan: Dysphagia Treatment  SLP Frequency: 3x Per Week  Estimated Duration: Until Therapy Goals Met    Anticipated Discharge Needs  Discharge " Recommendations: Recommend post-acute placement for additional speech therapy services prior to discharge home  Therapy Recommendations Upon DC: Dysphagia Training, Community Re-Integration, Patient / Family / Caregiver Education    Patient / Family Goals  Patient / Family Goal #1: Pt nodded head to being asked if he was hungry and thirsty  Goal #1 Outcome: Progressing slower than expected  Short Term Goals  Short Term Goal # 1: Patient will consume prefeeding trials with SLP only with no overt s/sx of aspiration or decline in respiratory status.  Goal Outcome # 1: Progressing slower than expected    Taryn Maxwell, SLP

## 2023-08-11 NOTE — CARE PLAN
The patient is Stable - Low risk of patient condition declining or worsening    Shift Goals  Clinical Goals: Wean o2, BP control  Patient Goals: rest, go home  Family Goals: go home,    Progress made toward(s) clinical / shift goals:    Problem: Pain - Standard  Goal: Alleviation of pain or a reduction in pain to the patient’s comfort goal  Outcome: Progressing     Problem: Knowledge Deficit - Standard  Goal: Patient and family/care givers will demonstrate understanding of plan of care, disease process/condition, diagnostic tests and medications  Outcome: Progressing     Problem: Urinary - Renal Perfusion  Goal: Ability to achieve and maintain adequate renal perfusion and functioning will improve  Outcome: Progressing     Problem: Respiratory  Goal: Patient will achieve/maintain optimum respiratory ventilation and gas exchange  Outcome: Progressing       Patient is not progressing towards the following goals:

## 2023-08-11 NOTE — CARE PLAN
The patient is Watcher - Medium risk of patient condition declining or worsening    Shift Goals  Clinical Goals: safety, wean O2, reduce anxiety  Patient Goals: comfort  Family Goals: comfort, safety    Progress made toward(s) clinical / shift goals:    Problem: Pain - Standard  Goal: Alleviation of pain or a reduction in pain to the patient’s comfort goal  Description: Target End Date:  Prior to discharge or change in level of care    Document on Vitals flowsheet    1.  Document pain using the appropriate pain scale per order or unit policy  2.  Educate and implement non-pharmacologic comfort measures (i.e. relaxation, distraction, massage, cold/heat therapy, etc.)  3.  Pain management medications as ordered  4.  Reassess pain after pain med administration per policy  5.  If opiods administered assess patient's response to pain medication is appropriate per POSS sedation scale  6.  Follow pain management plan developed in collaboration with patient and interdisciplinary team (including palliative care or pain specialists if applicable)  Outcome: Progressing  Note: Pt currently declines pain at this time     Problem: Respiratory  Goal: Patient will achieve/maintain optimum respiratory ventilation and gas exchange  Description: Target End Date:  Prior to discharge or change in level of care    Document on Assessment flowsheet    1.  Assess and monitor rate, rhythm, depth and effort of respiration  2.  Breath sounds assessed qshift and/or as needed  3.  Assess O2 saturation, administer/titrate oxygen as ordered  4.  Position patient for maximum ventilatory efficiency  5.  Turn, cough, and deep breath with splinting to improve effectiveness  6.  Collaborate with RT to administer medication/treatments per order  7.  Encourage use of incentive spirometer and encourage patient to cough after use and utilize splinting techniques if applicable  8.  Airway suctioning  9.  Monitor sputum production for changes in color,  consistency and frequency  10. Perform frequent oral hygiene  11. Alternate physical activity with rest periods  Outcome: Progressing  Note: Pt maintaining O2>90% on 4L oxymask     Problem: Skin Integrity  Goal: Skin integrity is maintained or improved  Description: Target End Date:  Prior to discharge or change in level of care    Document interventions on Skin Risk/Justin flowsheet groups and corresponding LDA    1.  Assess and monitor skin integrity, appearance and/or temperature  2.  Assess risk factors for impaired skin integrity and/or pressures ulcers  3.  Implement precautions to protect skin integrity in collaboration with interdisciplinary team  4.  Implement pressure ulcer prevention protocol if at risk for skin breakdown  5.  Confirm wound care consult if at risk for skin breakdown  6.  Ensure patient use of pressure relieving devices  (Low air loss bed, waffle overlay, heel protectors, ROHO cushion, etc)  Outcome: Progressing  Note: Pt able to turn self in bed, staff still assists in turns.      Problem: Fall Risk  Goal: Patient will remain free from falls  Description: Target End Date:  Prior to discharge or change in level of care    Document interventions on the [facility] Fall Risk Assessment    1.  Assess for fall risk factors  2.  Implement fall precautions  Outcome: Progressing  Note: Bed low and locked, bed alarm on, call light within reach, tele sitter in place, soft wrist restraints on.        Patient is not progressing towards the following goals:

## 2023-08-11 NOTE — PROGRESS NOTES
Patients restraints reapplied due to patient being restless and agitated. Pulling at NG tube and removing o2 frequently. MD Sanchez notified.

## 2023-08-11 NOTE — PROGRESS NOTES
Telemetry Shift Summary     Rhythm: SR-ST  HR:   Ectopy: rPAC, rPVC, rCoup  Measurements: 0.14/0.10/0.36       Normal Values  Rhythm: SR  HR:   Measurements: 0.12-0.20 / 0.04-0.10 / 0.30-0.52

## 2023-08-12 NOTE — CARE PLAN
The patient is Watcher - Medium risk of patient condition declining or worsening    Shift Goals  Clinical Goals: safety, wean o2, decrease aggitation  Patient Goals: be comfortable  Family Goals: safety and comfort    Progress made toward(s) clinical / shift goals:    Problem: Pain - Standard  Goal: Alleviation of pain or a reduction in pain to the patient’s comfort goal  Outcome: Progressing     Problem: Knowledge Deficit - Standard  Goal: Patient and family/care givers will demonstrate understanding of plan of care, disease process/condition, diagnostic tests and medications  Outcome: Progressing     Problem: Hemodynamics  Goal: Patient's hemodynamics, fluid balance and neurologic status will be stable or improve  Outcome: Progressing     Problem: Fluid Volume  Goal: Fluid volume balance will be maintained  Outcome: Progressing     Problem: Urinary - Renal Perfusion  Goal: Ability to achieve and maintain adequate renal perfusion and functioning will improve  Outcome: Progressing     Problem: Mechanical Ventilation  Goal: Safe management of artificial airway and ventilation  Outcome: Progressing  Goal: Successful weaning off mechanical ventilator, spontaneously maintains adequate gas exchange  Outcome: Progressing  Goal: Patient will be able to express needs and understand communication  Outcome: Progressing     Problem: Physical Regulation  Goal: Diagnostic test results will improve  Outcome: Progressing  Goal: Signs and symptoms of infection will decrease  Outcome: Progressing     Problem: Skin Integrity  Goal: Skin integrity is maintained or improved  Outcome: Progressing     Problem: Fall Risk  Goal: Patient will remain free from falls  Outcome: Progressing     Problem: Optimal Care of the Stroke Patient  Goal: Optimal emergency care for the stroke patient  Outcome: Progressing  Goal: Optimal acute care for the stroke patient  Outcome: Progressing     Problem: Knowledge Deficit - Stroke Education  Goal:  Patient's knowledge of stroke and risk factors will improve  Outcome: Progressing     Problem: Psychosocial - Patient Condition  Goal: Patient's ability to verbalize feelings about condition will improve  Outcome: Progressing  Goal: Patient's ability to re-evaluate and adapt role responsibilities will improve  Outcome: Progressing     Problem: Discharge Planning - Stroke  Goal: Ensure Stroke Core Measures are met prior to discharge  Outcome: Progressing  Goal: Patient’s continuum of care needs will be met  Outcome: Progressing     Problem: Neuro Status  Goal: Neuro status will remain stable or improve  Outcome: Progressing     Problem: Hemodynamic Monitoring  Goal: Patient's hemodynamics, fluid balance and neurologic status will be stable or improve  Outcome: Progressing     Problem: Respiratory - Stroke Patient  Goal: Patient will achieve/maintain optimum respiratory rate/effort  Outcome: Progressing     Problem: Dysphagia  Goal: Dysphagia will improve  Outcome: Progressing     Problem: Risk for Aspiration  Goal: Patient's risk for aspiration will be absent or decrease  Outcome: Progressing     Problem: Urinary Elimination  Goal: Establish and maintain regular urinary output  Outcome: Progressing     Problem: Bowel Elimination  Goal: Establish and maintain regular bowel function  Outcome: Progressing     Problem: Mobility - Stroke  Goal: Patient's capacity to carry out activities will improve  Outcome: Progressing  Goal: Spasticity will be prevented or improved  Outcome: Progressing  Goal: Subluxation will be prevented or improved  Outcome: Progressing     Problem: Self Care  Goal: Patient will have the ability to perform ADLs independently or with assistance (bathe, groom, dress, toilet and feed)  Outcome: Progressing     Problem: Psychosocial  Goal: Patient's ability to verbalize feelings about condition will improve  Outcome: Progressing  Goal: Patient's ability to re-evaluate and adapt role responsibilities  will improve  Outcome: Progressing  Goal: Patient's level of anxiety will decrease  Outcome: Progressing  Goal: Patient and family will demonstrate ability to cope with life altering diagnosis and/or procedure  Outcome: Progressing  Goal: Spiritual and cultural needs incorporated into hospitalization  Outcome: Progressing     Problem: Communication  Goal: The ability to communicate needs accurately and effectively will improve  Outcome: Progressing     Problem: Discharge Barriers/Planning  Goal: Patient's continuum of care needs are met  Outcome: Progressing     Problem: Mobility  Goal: Patient's capacity to carry out activities will improve  Outcome: Progressing

## 2023-08-12 NOTE — PROGRESS NOTES
Dr. Kyle notified of pt c/o chest pain, increase in WBCs and sodium, and ongoing tachypnea. EKG performed. New order for abg received.

## 2023-08-12 NOTE — PROGRESS NOTES
Returned call to patient's spouse ,Stephanie. Updated spouse on patient's condition and answered her questions.

## 2023-08-12 NOTE — PROGRESS NOTES
Telemetry Shift Summary    Rhythm SR-ST  HR Range 80s-120s  Ectopy R PVC/PAC  Measurements 0.14/0.10/0.30        Normal Values  Rhythm SR  HR Range    Measurements 0.12-0.20 / 0.06-0.10  / 0.30-0.52

## 2023-08-12 NOTE — PROGRESS NOTES
OVERNIGHT HOSPITALIST:    Paged by nursing Staff multiple times this evening.  Earlier in the shift, he was having fevers and later, staff concerned about increasing somnolence.    ABG was done: 7.47/46.8/82/34.1  Repeat chest x-ray does not show any acute changes.  Noticed increasing WBC this morning 21.2, sodium is 150.  proBNP 4379.    He remains tachypneic and tachycardic, maintaining good oxygenation on 3 L of oxygen via oxy mask.    Again to the room to evaluate him.  He is calm, open eyes when I call him but overall somnolent.     He is now on Zosyn and linezolid which I am continuing.  We discussed with the hospitalist Lasix versus IV fluids to address hypernatremia.  Also MRI of the brain.

## 2023-08-12 NOTE — DIETARY
Nutrition Services Brief Update:    Problem: Nutritional:  Goal: Nutrition support tolerated and meeting greater than 85% of estimated needs  Outcome: Goal Met    Per flowsheets, TF @ goal of 55 ml/hr on 8/11 @ 2039. Per RN, pt's tube feeding is still at goal and he is doing well so far.     RD following.

## 2023-08-12 NOTE — PROGRESS NOTES
Hospital Medicine Daily Progress Note    Date of Service  8/12/2023    Chief Complaint  Kobe Spear is a 63 y.o. male admitted 8/3/2023 with abdominal pain.    Hospital Course  History of CVA, CKD stage III, DM2, GERD who is traveling through [place] and developed sudden onset severe abdominal pain and presented to the ER where he was found to have markedly elevated LFTs and lipase highly suggestive of gallstone pancreatitis.  Right upper quadrant ultrasound revealed cholelithiasis however no CBD dilation.  General surgery was consulted and recommended MRCP.  He was started on empiric IV antibiotic therapy due to marked leukocytosis .  MRCP showed no evidence of choledocholithiasis only cholelithiasis.  And confirmed acute pancreatitis.  Patient became markedly agitated and was upgraded to the ICU so Precedex could be used.  He was in the ICU 8/5 through 8/10.  He also had respiratory failure secondary to the agitation and required BiPAP high flow nasal cannula.  He has been weaned from this and is now down to 4 L nasal cannula and saturating well.  He remains very anxious and constantly moving and thus wife is at bedside.    Interval Problem Update  8/11 patient anxious and required restraints overnight.  Seen with wife at bedside.  Patient has no abdominal pain and is tolerating NG tube feeds well.  He is very anxious and tachypneic at time of examination however with repositioning his tachypnea resolved.  He told me he wanted to try and eat cheese today.  He is not having any neurologic changes and the right upper extremity weakness is the same per wife at bedside.  Leukocytosis persists and patient is having elevated temperature.  He had blood cultures done on 8/6 which came back negative.  I will get a chest x-ray as well as repeat blood cultures at this time.  Addendum:  patient with decompensation and higher temp, hr and RR this afternoon.  CXR stable, concern he is again septic.  Blood cultures, lactic  acid, and UA pending.  Discussed with Dr Campa, low threshold to place back in the ICU.  I've added zyvox for broader coverage and will continue with zosyn.    8/12 -patient appears similar to how he did yesterday afternoon.  He had breaking of the fever yesterday but had another fever again today up to 102.  Chest x-ray was done which showed stable changes and actually overall improvement from earlier in hospital stay.  Sodium is elevated at 150 and will start half NS to help bring the sodium down.  CT chest abdomen pelvis currently pending to evaluate for any possible evidence of occult infection.  MRI of the brain given his stroke history and increased lethargy recently.  Zyvox initiated yesterday but WBC count is up to 21.2 this morning.  Blood cultures drawn from yesterday are showing no evidence of growth.  UA is not consistent with infection.  Given his known gallstones there may be occult infection there however he has been on Zosyn with a WBC count that was trending down.  Though there is no evidence on chest x-ray to suspect COVID given his high fevers I will check a respiratory PCR.      I have discussed this patient's plan of care and discharge plan at IDT rounds today with Case Management, Nursing, Nursing leadership, and other members of the IDT team.    Consultants/Specialty  none    Code Status  Full Code    Disposition  The patient is not medically cleared for discharge to home or a post-acute facility.  Anticipate discharge to: skilled nursing facility    I have placed the appropriate orders for post-discharge needs.    Review of Systems  Review of Systems   Constitutional:  Negative for chills and fever.   HENT:  Negative for congestion and sore throat.    Eyes:  Negative for blurred vision and photophobia.   Respiratory:  Negative for cough and shortness of breath.    Cardiovascular:  Negative for chest pain, claudication and leg swelling.   Gastrointestinal:  Negative for abdominal pain,  constipation, diarrhea, heartburn and vomiting.   Genitourinary:  Negative for dysuria and hematuria.   Musculoskeletal:  Negative for joint pain and myalgias.   Skin:  Negative for itching and rash.   Neurological:  Negative for dizziness, sensory change, speech change, weakness and headaches.   Psychiatric/Behavioral:  Negative for depression. The patient is not nervous/anxious and does not have insomnia.         Physical Exam  Temp:  [37.3 °C (99.1 °F)-39.4 °C (103 °F)] 38.9 °C (102 °F)  Pulse:  [] 91  Resp:  [20-36] 28  BP: (135-177)/(62-92) 151/64  SpO2:  [94 %-98 %] 98 %    Physical Exam  Vitals and nursing note reviewed.   Constitutional:       General: He is not in acute distress.     Appearance: Normal appearance. He is diaphoretic.   HENT:      Head: Normocephalic and atraumatic.   Eyes:      General: No scleral icterus.     Extraocular Movements: Extraocular movements intact.   Cardiovascular:      Rate and Rhythm: Normal rate and regular rhythm.      Pulses: Normal pulses.      Heart sounds: Normal heart sounds. No murmur heard.  Pulmonary:      Effort: Pulmonary effort is normal. No respiratory distress.      Breath sounds: Normal breath sounds. No wheezing, rhonchi or rales.      Comments: Tachypneic when anxious, breathing slows when he calms down  Abdominal:      General: Abdomen is flat. Bowel sounds are normal. There is no distension.      Palpations: Abdomen is soft.      Tenderness: There is no abdominal tenderness. There is no guarding or rebound.   Musculoskeletal:         General: No swelling or tenderness.      Cervical back: Normal range of motion and neck supple.   Lymphadenopathy:      Cervical: No cervical adenopathy.   Skin:     Coloration: Skin is not jaundiced.      Findings: No erythema.   Neurological:      General: No focal deficit present.      Mental Status: He is alert and oriented to person, place, and time. Mental status is at baseline.      Cranial Nerves: No cranial  nerve deficit.      Motor: Weakness (Right-sided arm hemiparesis) present.   Psychiatric:         Mood and Affect: Mood normal.      Comments: Confused, difficult to orient unless wife at bedside         Fluids    Intake/Output Summary (Last 24 hours) at 8/12/2023 1359  Last data filed at 8/12/2023 0607  Gross per 24 hour   Intake 510 ml   Output 2250 ml   Net -1740 ml         Laboratory  Recent Labs     08/10/23  0330 08/11/23  0135 08/12/23  0010   WBC 12.4* 13.1* 21.2*   RBC 5.14 4.85 5.10   HEMOGLOBIN 13.9* 13.3* 13.7*   HEMATOCRIT 44.5 42.9 45.1   MCV 86.6 88.5 88.4   MCH 27.0 27.4 26.9*   MCHC 31.2* 31.0* 30.4*   RDW 48.0 50.8* 51.0*   PLATELETCT 283 307 320   MPV 10.0 10.7 11.0       Recent Labs     08/10/23  0330 08/11/23  0135 08/12/23  0010   SODIUM 149* 148* 150*   POTASSIUM 3.4* 3.5* 3.6   CHLORIDE 106 109 110   CO2 31 29 29   GLUCOSE 181* 203* 249*   BUN 29* 28* 35*   CREATININE 0.68 0.78 1.11   CALCIUM 8.4 8.2* 8.2*                     Imaging  DX-CHEST-PORTABLE (1 VIEW)   Final Result      1.  Bibasilar atelectasis, less likely pneumonitis.      2.  Small bilateral pleural effusions.      DX-CHEST-LIMITED (1 VIEW)   Final Result      Stable bilateral atelectasis/consolidation.      [id number]-[id number] VIEW   Final Result      NG tube tip overlies the gastric antrum.      DX-ABDOMEN FOR TUBE PLACEMENT   Final Result      Malpositioned nasogastric tube. The tube extends to the gastroesophageal junction and then reverses course with tip located within the upper thoracic esophagus.      A report was called to the patient's ICU nurse at 2:55 PM on 8/9/2023      US-RUQ   Final Result      1.  Gallstones within the gallbladder. No evidence of biliary ductal dilatation.      2.  The liver is echogenic consistent with fatty change versus hepatocellular dysfunction.      DX-ABDOMEN FOR TUBE PLACEMENT   Final Result      1.  NG tube courses into the fundus of the stomach      DX-CHEST-LIMITED (1 VIEW)   Final Result          Groundglass and airspace opacities bilaterally, slightly worse than prior      DX-CHEST-PORTABLE (1 VIEW)   Final Result      1.  Increased patchy bilateral airspace opacities.   2.  Probable small bilateral pleural effusions.   3.  Interval placement of an enteric feeding tube which courses towards the stomach with the distal tip not visualized.   4.  Stable enlargement of the cardiomediastinal silhouette.      DX-ABDOMEN FOR TUBE PLACEMENT   Final Result      NG tube tip overlies the gastric body.      EC-ECHOCARDIOGRAM COMPLETE W/ CONT   Final Result      DX-CHEST-PORTABLE (1 VIEW)   Final Result      Bibasilar atelectasis and small bilateral pleural effusions, similar to prior study.         CT-CTA CHEST PULMONARY ARTERY W/ RECONS   Final Result         1. No pulmonary embolus.   2. Mild bibasilar airspace disease, atelectasis versus infiltrate   3. Small simple right pleural effusion.   4. Mild ascites in the upper quadrant.   5. Minimal subsegmental atelectasis in upper lung zones.            DX-CHEST-PORTABLE (1 VIEW)   Final Result      No significant change from prior exam.      NB-[id number]-U/O   Final Result         1. No choledocholithiasis. No CBD dilatation.      2. Cholelithiasis.      3. Fluid surrounding the pancreas, in keeping with acute pancreatitis.      US-RUQ   Final Result      1.  Hepatic steatosis.      2.  Cholelithiasis.      CT-ABDOMEN-PELVIS WITH   Final Result         1.  Cholelithiasis.      2.  Marked acute pancreatitis      DX-CHEST-PORTABLE (1 VIEW)   Final Result      Hypoinflation with borderline cardiomegaly. No lobar consolidation or large pleural effusions.         MR-BRAIN-WITH & W/O    (Results Pending)   CT-CHEST,ABDOMEN,PELVIS WITH    (Results Pending)          Assessment/Plan  * Acute biliary pancreatitis without infection or necrosis- (present on admission)  Assessment & Plan  Patient without any further abdominal pain.  Is taking in nutrition via tube feeds through  his NG tube as he is not yet safe to swallow.  Last lipase 67      History of CVA (cerebrovascular accident)  Assessment & Plan  Residual right arm hemiparesis  Once he is taking p.o., resume statin, aspirin  Please caution with mobilizing  PT OT to continue  MRI brain pending    Stage 3a chronic kidney disease (HCC)- (present on admission)  Assessment & Plan  Avoid/minimize nephrotoxins as much as possible, renally dose medications, monitor inputs and outputs     Type 2 diabetes mellitus with kidney complication, without long-term current use of insulin (HCC)- (present on admission)  Assessment & Plan  Sliding scale insulin as needed      Leukocytosis- (present on admission)  Assessment & Plan  WBC count 21.2  Day 8 of Zosyn today, Zyvox added with fever  CT chest abdomen pelvis, MRI brain pending  Procalcitonin 0.99  Chest x-ray shows stable edema, improving overall  UA not consistent with infection  Blood cultures collected 8/11 and currently showing no growth    Gastroesophageal reflux disease without esophagitis- (present on admission)  Assessment & Plan  Resume omeprazole    Anxiety- (present on admission)  Assessment & Plan  Resume fluoxetine via NG tube  The acute withdrawal of this medication could be contributing to some of his confusion.      Symptomatic cholelithiasis- (present on admission)  Assessment & Plan  Patient will eventually need laparoscopic cholecystectomy however patient's wife wants to get patient back to [place] for any type of surgical intervention as long as it safe.    Repeat CT chest abdomen pelvis given continued fevers, tachycardia, tachypnea.         VTE prophylaxis:    heparin ppx      I have performed a physical exam and reviewed and updated ROS and Plan today (8/12/2023). In review of yesterday's note (8/11/2023), there are no changes except as documented above.

## 2023-08-12 NOTE — CARE PLAN
The patient is Watcher - Medium risk of patient condition declining or worsening    Shift Goals  Clinical Goals: Decrease agitation  Patient Goals: unable to assess  Family Goals: sleep    Progress made toward(s) clinical / shift goals:  Pharmacologic and nonpharmacologic comfort measures in place. Routine assessments performed. Care clustered to allow for rest.    Patient is not progressing towards the following goals:

## 2023-08-13 NOTE — PROGRESS NOTES
Telemetry Strip     Strip printed: 1206  Measurements from am strip were as follows:  Rhythm: SR  HR: 75-95  Measurements: 0.14/ 0.10/ 0.36  Ectopy: f PAC, r PVC             Normal Values  Rhythm SR  HR Range    Measurements 0.12-0.20 / 0.06-0.10  / 0.30-0.52

## 2023-08-13 NOTE — PROGRESS NOTES
Hospital Medicine Daily Progress Note    Date of Service  8/13/2023    Chief Complaint  Kobe Spear is a 63 y.o. male admitted 8/3/2023 with abdominal pain.    Hospital Course  History of CVA, CKD stage III, DM2, GERD who is traveling through [place] and developed sudden onset severe abdominal pain and presented to the ER where he was found to have markedly elevated LFTs and lipase highly suggestive of gallstone pancreatitis.  Right upper quadrant ultrasound revealed cholelithiasis however no CBD dilation.  General surgery was consulted and recommended MRCP.  He was started on empiric IV antibiotic therapy due to marked leukocytosis .  MRCP showed no evidence of choledocholithiasis only cholelithiasis.  And confirmed acute pancreatitis.  Patient became markedly agitated and was upgraded to the ICU so Precedex could be used.  He was in the ICU 8/5 through 8/10.  He also had respiratory failure secondary to the agitation and required BiPAP high flow nasal cannula.  He has been weaned from this and is now down to 4 L nasal cannula and saturating well.  He remains very anxious and constantly moving and thus wife is at bedside.    Interval Problem Update  8/11 patient anxious and required restraints overnight.  Seen with wife at bedside.  Patient has no abdominal pain and is tolerating NG tube feeds well.  He is very anxious and tachypneic at time of examination however with repositioning his tachypnea resolved.  He told me he wanted to try and eat cheese today.  He is not having any neurologic changes and the right upper extremity weakness is the same per wife at bedside.  Leukocytosis persists and patient is having elevated temperature.  He had blood cultures done on 8/6 which came back negative.  I will get a chest x-ray as well as repeat blood cultures at this time.  Addendum:  patient with decompensation and higher temp, hr and RR this afternoon.  CXR stable, concern he is again septic.  Blood cultures, lactic  acid, and UA pending.  Discussed with Dr Campa, low threshold to place back in the ICU.  I've added zyvox for broader coverage and will continue with zosyn.    8/12 -patient appears similar to how he did yesterday afternoon.  He had breaking of the fever yesterday but had another fever again today up to 102.  Chest x-ray was done which showed stable changes and actually overall improvement from earlier in hospital stay.  Sodium is elevated at 150 and will start half NS to help bring the sodium down.  CT chest abdomen pelvis currently pending to evaluate for any possible evidence of occult infection.  MRI of the brain given his stroke history and increased lethargy recently.  Zyvox initiated yesterday but WBC count is up to 21.2 this morning.  Blood cultures drawn from yesterday are showing no evidence of growth.  UA is not consistent with infection.  Given his known gallstones there may be occult infection there however he has been on Zosyn with a WBC count that was trending down.  Though there is no evidence on chest x-ray to suspect COVID given his high fevers I will check a respiratory PCR.  8/13 patient actually looks better today compared to the last 36 hours.  He states he has no pain and both I and general surgery palpated his right upper quadrant and he is pain-free at this time.  Having not received any pain medications.  Blood pressure still remains high but has improved with the adjustment of the medications.  His sodium is elevated at 152 and I have started him on D5W as well as increase his free water flushes.  With the initiation of D5 I have also increased his sliding scale insulin coverage.  Patient has been afebrile for the last 24 hours.  We will continue with same antibiotics at this time.  Appreciate general surgery consultation.    I have discussed this patient's plan of care and discharge plan at IDT rounds today with Case Management, Nursing, Nursing leadership, and other members of the IDT  team.    Consultants/Specialty  General surgery    Code Status  Full Code    Disposition  The patient is not medically cleared for discharge to home or a post-acute facility.  Anticipate discharge to: skilled nursing facility    I have placed the appropriate orders for post-discharge needs.    Review of Systems  Review of Systems   Constitutional:  Negative for chills and fever.   HENT:  Negative for congestion and sore throat.    Eyes:  Negative for blurred vision and photophobia.   Respiratory:  Negative for cough and shortness of breath.    Cardiovascular:  Negative for chest pain, claudication and leg swelling.   Gastrointestinal:  Negative for abdominal pain, constipation, diarrhea, heartburn, nausea and vomiting.   Genitourinary:  Negative for dysuria and hematuria.   Musculoskeletal:  Negative for joint pain and myalgias.   Skin:  Negative for itching and rash.   Neurological:  Negative for dizziness, sensory change, speech change, weakness and headaches.   Psychiatric/Behavioral:  Negative for depression. The patient is not nervous/anxious and does not have insomnia.         Physical Exam  Temp:  [36.3 °C (97.4 °F)-37.4 °C (99.3 °F)] 36.8 °C (98.3 °F)  Pulse:  [] 78  Resp:  [17-28] 18  BP: (133-184)/(32-74) 164/63  SpO2:  [95 %-99 %] 97 %    Physical Exam  Vitals and nursing note reviewed.   Constitutional:       General: He is not in acute distress.     Appearance: Normal appearance. He is not diaphoretic.   HENT:      Head: Normocephalic and atraumatic.   Eyes:      General: No scleral icterus.     Extraocular Movements: Extraocular movements intact.   Cardiovascular:      Rate and Rhythm: Normal rate and regular rhythm.      Pulses: Normal pulses.      Heart sounds: Normal heart sounds. No murmur heard.  Pulmonary:      Effort: Pulmonary effort is normal. No respiratory distress.      Breath sounds: Normal breath sounds. No wheezing, rhonchi or rales.      Comments: Intermittently tachypneic when  anxious  Abdominal:      General: Abdomen is flat. Bowel sounds are normal. There is no distension.      Palpations: Abdomen is soft.      Tenderness: There is no abdominal tenderness. There is no guarding or rebound.   Musculoskeletal:         General: No swelling or tenderness.      Cervical back: Normal range of motion and neck supple.   Lymphadenopathy:      Cervical: No cervical adenopathy.   Skin:     Coloration: Skin is not jaundiced.      Findings: No erythema.   Neurological:      General: No focal deficit present.      Mental Status: He is alert and oriented to person, place, and time. Mental status is at baseline.      Cranial Nerves: No cranial nerve deficit.      Motor: No weakness (Right-sided arm hemiparesis).   Psychiatric:         Mood and Affect: Mood normal.      Comments: Confused, difficult to orient unless wife at bedside         Fluids    Intake/Output Summary (Last 24 hours) at 8/13/2023 1327  Last data filed at 8/13/2023 1200  Gross per 24 hour   Intake 450 ml   Output 1300 ml   Net -850 ml         Laboratory  Recent Labs     08/11/23 0135 08/12/23  0010 08/13/23  0230   WBC 13.1* 21.2* 23.7*   RBC 4.85 5.10 4.82   HEMOGLOBIN 13.3* 13.7* 12.8*   HEMATOCRIT 42.9 45.1 43.5   MCV 88.5 88.4 90.2   MCH 27.4 26.9* 26.6*   MCHC 31.0* 30.4* 29.4*   RDW 50.8* 51.0* 52.6*   PLATELETCT 307 320 288   MPV 10.7 11.0 11.5       Recent Labs     08/11/23 0135 08/12/23  0010 08/13/23  0230   SODIUM 148* 150* 152*   POTASSIUM 3.5* 3.6 3.7   CHLORIDE 109 110 113*   CO2 29 29 30   GLUCOSE 203* 249* 252*   BUN 28* 35* 36*   CREATININE 0.78 1.11 0.93   CALCIUM 8.2* 8.2* 8.1*                     Imaging  MR-BRAIN-WITH & W/O   Final Result      1.  RIGHT basal ganglia lacunar infarction   2.  No hemorrhage   3.  LEFT supratentorial encephalomalacia as described above with Wallerian degeneration in the ipsilateral brainstem   4.  6 mm frontal meningioma   5.  Atrophy   6.  White matter changes       CT-CHEST,ABDOMEN,PELVIS WITH   Final Result      1.  Acute pancreatitis with peripancreatic fluid but no pancreatic necrosis or pseudocyst      2.  Cholelithiasis      3.  Dilated gallbladder, nonspecific although could indicate cholecystitis in the appropriate clinical setting      4.  Mild bilateral atelectasis and small bilateral pleural effusion      5.  Hepatic steatosis and hepatomegaly      6.  Enteric catheter and bladder catheter appear appropriately located      DX-CHEST-PORTABLE (1 VIEW)   Final Result      1.  Bibasilar atelectasis, less likely pneumonitis.      2.  Small bilateral pleural effusions.      DX-CHEST-LIMITED (1 VIEW)   Final Result      Stable bilateral atelectasis/consolidation.      [id number] VIEW   Final Result      NG tube tip overlies the gastric antrum.      DX-ABDOMEN FOR TUBE PLACEMENT   Final Result      Malpositioned nasogastric tube. The tube extends to the gastroesophageal junction and then reverses course with tip located within the upper thoracic esophagus.      A report was called to the patient's ICU nurse at 2:55 PM on 8/9/2023      US-RUQ   Final Result      1.  Gallstones within the gallbladder. No evidence of biliary ductal dilatation.      2.  The liver is echogenic consistent with fatty change versus hepatocellular dysfunction.      DX-ABDOMEN FOR TUBE PLACEMENT   Final Result      1.  NG tube courses into the fundus of the stomach      DX-CHEST-LIMITED (1 VIEW)   Final Result         Groundglass and airspace opacities bilaterally, slightly worse than prior      DX-CHEST-PORTABLE (1 VIEW)   Final Result      1.  Increased patchy bilateral airspace opacities.   2.  Probable small bilateral pleural effusions.   3.  Interval placement of an enteric feeding tube which courses towards the stomach with the distal tip not visualized.   4.  Stable enlargement of the cardiomediastinal silhouette.      DX-ABDOMEN FOR TUBE PLACEMENT   Final Result      NG tube tip overlies  the gastric body.      EC-ECHOCARDIOGRAM COMPLETE W/ CONT   Final Result      DX-CHEST-PORTABLE (1 VIEW)   Final Result      Bibasilar atelectasis and small bilateral pleural effusions, similar to prior study.         CT-CTA CHEST PULMONARY ARTERY W/ RECONS   Final Result         1. No pulmonary embolus.   2. Mild bibasilar airspace disease, atelectasis versus infiltrate   3. Small simple right pleural effusion.   4. Mild ascites in the upper quadrant.   5. Minimal subsegmental atelectasis in upper lung zones.            DX-CHEST-PORTABLE (1 VIEW)   Final Result      No significant change from prior exam.      [id number]   Final Result         1. No choledocholithiasis. No CBD dilatation.      2. Cholelithiasis.      3. Fluid surrounding the pancreas, in keeping with acute pancreatitis.      US-RUQ   Final Result      1.  Hepatic steatosis.      2.  Cholelithiasis.      CT-ABDOMEN-PELVIS WITH   Final Result         1.  Cholelithiasis.      2.  Marked acute pancreatitis      DX-CHEST-PORTABLE (1 VIEW)   Final Result      Hypoinflation with borderline cardiomegaly. No lobar consolidation or large pleural effusions.                Assessment/Plan  * Acute biliary pancreatitis without infection or necrosis- (present on admission)  Assessment & Plan  Patient without any further abdominal pain.  Is taking in nutrition via tube feeds through his NG tube as he is not yet safe to swallow.  Last lipase 67, radiographic findings on CT still showing significant pancreatitis with peripancreatic fluid, no evidence of necrosis or pseudocyst      History of CVA (cerebrovascular accident)  Assessment & Plan  Residual right arm hemiparesis  Aspirin/Plavix to remain on hold as he will need upcoming surgery and this would delay it further.  Please caution with mobilizing  PT OT to continue  MRI brain shows a right basal ganglia lacunar infarct, no hemorrhage and left supratentorial encephalomalacia likely related to his previous  strokes.  Resume statin via NG tube    Stage 3a chronic kidney disease (HCC)- (present on admission)  Assessment & Plan  Avoid/minimize nephrotoxins as much as possible, renally dose medications, monitor inputs and outputs     Type 2 diabetes mellitus with kidney complication, without long-term current use of insulin (HCC)- (present on admission)  Assessment & Plan  Sliding scale insulin as needed      Leukocytosis- (present on admission)  Assessment & Plan  WBC count 23.7  Day 9 of Zosyn today, Zyvox added 2 days ago  CT chest abdomen pelvis just shows the known gallstones, dilated gallbladder and pancreatitis with peripancreatic fluid, no evidence of abscess or other etiology to explain fevers, leukocytosis  MRI brain did show a small right-sided lacunar infarct, this should not have resulted in leukocytosis or fevers.  Procalcitonin 0.99  Chest x-ray shows stable edema, improving overall  UA not consistent with infection  Blood cultures collected 8/11 and currently showing no growth    Gastroesophageal reflux disease without esophagitis- (present on admission)  Assessment & Plan  Resume omeprazole    Anxiety- (present on admission)  Assessment & Plan  Resume fluoxetine via NG tube  The acute withdrawal of this medication could be contributing to some of his confusion.      Symptomatic cholelithiasis- (present on admission)  Assessment & Plan  Repeat CT chest abdomen pelvis shows no evidence of abscess, he still has significant pancreatitis and peripancreatic fluid but his gallbladder with palpation is no longer tender.  General surgery was consulted and does not feel that the fevers and the confusion, impulsivity and WBC count is related to an acute cholecystitis.  The patient will need to have his gallbladder removed prior to discharge from the hospital.  General surgery would like him in a more stable condition before taking him to the OR.         VTE prophylaxis:   SCDs/TEDs      I have performed a physical  exam and reviewed and updated ROS and Plan today (8/13/2023). In review of yesterday's note (8/12/2023), there are no changes except as documented above.

## 2023-08-13 NOTE — H&P
"Surgical History and Physical    Date: 8/13/2023    Requesting Physician: Dr. Sanchez, hospitalist service  PCP: Pcp Pt States None  Attending Physician: Sonam Copeland M.D. [facility]    CC: \"I feel ok today\"    HPI: This is a 63 y.o. male with PMH DM2, HLD, HTN, atopic dermatitis, MDD, stroke in 2013 resulting in right sided deficits and aphasia, gallstones who was admitted on 8/3/23 with c/o abdominal pain and vomiting. He was diagnosed with gallstone pancreatitis.     Two days into his hospitaliszation he decompensated and was transferred to the ICU. This was compliated by ICU delerium and hypoxic respiratory failure and pulmonary edema.     Over the last 24h Mr. Kuo became more anxious and agitated, requiring restraints. He has had a leokocytosis and fevers, so far blood cx negative, UA no e/o infection, CXR no PNA. MRI brain yesterday showed small acute stroke.    He is lying comfortably in bed, tolerating TF, on HFNC, tachypnec. Answering questions, denies abdominal pain or other complaints.    PMH:  DM2, HLD, HTN, atopic dermatitis, MDD, stroke in 2013 resulting in right sided deficits and aphasia    Surgical hx:    Current Facility-Administered Medications   Medication Dose Route Frequency Provider Last Rate Last Admin    metoprolol tartrate (Lopressor) tablet 100 mg  100 mg Enteral Tube TWICE DAILY VISHNU RodriguezONatasha        dextrose 5% infusion   Intravenous Continuous Marilee Sanchez D.O. 83 mL/hr at 08/13/23 1001 New Bag at 08/13/23 1001    [START ON 8/14/2023] lisinopril (Prinivil) tablet 40 mg  40 mg Enteral Tube Q DAY VISHNU RodriguezO.        insulin regular (HumuLIN R,NovoLIN R) injection  3-14 Units Subcutaneous Q6HRS Marilee Sanchez D.O.        And    dextrose 50% (D50W) injection 25 g  25 g Intravenous Q15 MIN PRN VISHNU RodriguezO.        FLUoxetine (PROzac) capsule 20 mg  20 mg Enteral Tube DAILY VISHNU RodriguezONatasha   20 mg at 08/13/23 0619    gabapentin (Neurontin) capsule 300 mg  " 300 mg Enteral Tube Nightly FAITH Rodriguez.ONatasha   300 mg at 08/12/23 2153    gabapentin (Neurontin) capsule 200 mg  200 mg Enteral Tube BID FAITH Rodriguez.ONatasha   200 mg at 08/13/23 0621    cloNIDine (Catapres) tablet 0.2 mg  0.2 mg Enteral Tube TWICE DAILY VISHNU RodriguezONatasha   0.2 mg at 08/13/23 0621    Linezolid (Zyvox) premix 600 mg  600 mg Intravenous Q12HRS VISHNU RodriguezO.   Stopped at 08/13/23 0713    Pharmacy Consult: Enteral tube insertion - review meds/change route/product selection   Other PHARMACY TO DOSE Diamond Banuelos M.D.        famotidine (Pepcid) tablet 20 mg  20 mg Enteral Tube BID Diamond Banuelos M.D.   20 mg at 08/12/23 1832    Or    famotidine (Pepcid) injection 20 mg  20 mg Intravenous BID Diamond Banuelos M.D.   20 mg at 08/13/23 0622    thiamine (Vitamin B-1) tablet 100 mg  100 mg Enteral Tube DAILY Diamond Banuelos M.D.   100 mg at 08/13/23 0622    acetaminophen (Tylenol) tablet 650 mg  650 mg Enteral Tube Q4HRS PRN Diamond Banuelos M.D.   650 mg at 08/12/23 1217    LORazepam (Ativan) injection 0.5 mg  0.5 mg Intravenous Q4HRS PRN Magalis Hernandez M.D.   0.5 mg at 08/12/23 1527    hydrALAZINE (Apresoline) injection 10 mg  10 mg Intravenous Q6HRS PRN Yahaira Higuera M.D.   10 mg at 08/11/23 1440    bisacodyl (Dulcolax) suppository 10 mg  10 mg Rectal QDAY PRN Inge Norman M.D.        HYDROmorphone (Dilaudid) injection 1 mg  1 mg Intravenous Q2HRS PRN Yahaira Higuera M.D.   1 mg at 08/12/23 0950    ipratropium-albuterol (DUONEB) nebulizer solution  3 mL Nebulization Q4H PRN (RT) Yahaira Higuera M.D.        Respiratory Therapy Consult   Nebulization Continuous RT Inge Norman M.D.        piperacillin-tazobactam (Zosyn) 3.375 g in  mL IVPB  3.375 g Intravenous Q8HRS Marilee Sanchez D.O. 25 mL/hr at 08/13/23 0618 3.375 g at 08/13/23 0618    labetalol (Normodyne/Trandate) injection 10 mg  10 mg Intravenous Q4HRS PRN Yazmin HOLLY  AURELIO Gastelum   10 mg at 08/11/23 1301    Pharmacy Consult Request ...Pain Management Review 1 Each  1 Each Other PHARMACY TO DOSE Paco Munoz M.D.        heparin injection 5,000 Units  5,000 Units Subcutaneous Q8HRS Paco Munoz M.D.   5,000 Units at 08/13/23 0619    ondansetron (Zofran) syringe/vial injection 4 mg  4 mg Intravenous Q4HRS PRN Inge Norman M.D.   4 mg at 08/04/23 0009    prochlorperazine (Compazine) injection 10 mg  10 mg Intravenous Q6HRS PRN Inge Norman M.D.           Social History     Socioeconomic History    Marital status:      Spouse name: Not on file    Number of children: Not on file    Years of education: Not on file    Highest education level: Not on file   Occupational History    Not on file   Tobacco Use    Smoking status: Never    Smokeless tobacco: Never   Vaping Use    Vaping Use: Never used   Substance and Sexual Activity    Alcohol use: Never    Drug use: Never    Sexual activity: Not on file   Other Topics Concern    Not on file   Social History Narrative    Not on file     Social Determinants of Health     Financial Resource Strain: Not on file   Food Insecurity: Not on file   Transportation Needs: Not on file   Physical Activity: Not on file   Stress: Not on file   Social Connections: Not on file   Intimate Partner Violence: Not on file   Housing Stability: Not on file       History reviewed. No pertinent family history.    Allergies:  Patient has no known allergies.    Review of Systems:  Constitutional: Negative for fever, chills, weight loss, malaise/fatigue and diaphoresis.   HENT: Negative for hearing loss, ear pain, nosebleeds, congestion, sore throat, neck pain, tinnitus and ear discharge.    Eyes: Negative for blurred vision, double vision, photophobia, pain, discharge and redness.   Respiratory: Negative for cough, hemoptysis, sputum production, shortness of breath, wheezing and stridor.    Cardiovascular: Negative for chest pain,  "palpitations, orthopnea, claudication, leg swelling and PND.   Gastrointestinal: Negative for heartburn, nausea, vomiting, abdominal pain, diarrhea, constipation, blood in stool and melena.   Genitourinary: Negative for dysuria, urgency, frequency, hematuria and flank pain.   Musculoskeletal: Negative for myalgias, back pain, joint pain and falls.   Skin: Negative for itching and rash.  Neurological: Negative for dizziness, tingling, tremors, sensory change, speech change, focal weakness, seizures, loss of consciousness, weakness and headaches.   Endo/Heme/Allergies: Negative for environmental allergies and polydipsia. Does not bruise/bleed easily.   Psychiatric/Behavioral: Negative for depression, suicidal ideas, hallucinations, memory loss and substance abuse. The patient is not nervous/anxious and does not have insomnia.    Physical Exam:  BP (!) 133/32   Pulse 76   Temp 37.1 °C (98.7 °F) (Axillary)   Resp 18   Ht 1.753 m (5' 9\")   Wt 99.5 kg (219 lb 5.7 oz)   SpO2 99%     Constitutional: he is oriented to person, place, and time.  he appears well-developed and well-nourished. No distress.   Head: Normocephalic and atraumatic.   Neck: Normal range of motion. Neck supple. No JVD present. No tracheal deviation present. No thyromegaly present.   Cardiovascular: Normal rate, regular rhythm, normal heart sounds and intact distal pulses.  Exam reveals no gallop and no friction rub.  No murmur heard.  Pulmonary/Chest: Effort normal and breath sounds normal, decreased at bases. No stridor. Tachypnec.  Abdominal: Soft. Bowel sounds are normal. he exhibits no distension and no mass. There is no tenderness. There is no rebound and no guarding.   Musculoskeletal: Normal range of motion. he exhibits no edema and no tenderness.   Neurological: he is alert and oriented to person, place, and time. he has normal reflexes. No cranial nerve deficit. Coordination normal.   Skin: Skin is warm and dry. No rash noted. he is not " diaphoretic. No erythema. No pallor.   Psychiatric: he has a normal mood and affect.  Behavior is normal.       Labs:  Recent Labs     08/11/23 0135 08/12/23  0010 08/13/23  0230   WBC 13.1* 21.2* 23.7*   RBC 4.85 5.10 4.82   HEMOGLOBIN 13.3* 13.7* 12.8*   HEMATOCRIT 42.9 45.1 43.5   MCV 88.5 88.4 90.2   MCH 27.4 26.9* 26.6*   MCHC 31.0* 30.4* 29.4*   RDW 50.8* 51.0* 52.6*   PLATELETCT 307 320 288   MPV 10.7 11.0 11.5     Recent Labs     08/11/23 0135 08/12/23 0010 08/13/23  0230   SODIUM 148* 150* 152*   POTASSIUM 3.5* 3.6 3.7   CHLORIDE 109 110 113*   CO2 29 29 30   GLUCOSE 203* 249* 252*   BUN 28* 35* 36*   CREATININE 0.78 1.11 0.93   CALCIUM 8.2* 8.2* 8.1*         Recent Labs     08/11/23 0135 08/12/23  0010 08/13/23  0230   ASTSGOT 48* 45 63*   ALTSGPT 109* 84* 71*   TBILIRUBIN 0.8 0.8 0.6   ALKPHOSPHAT 152* 138* 125*   GLOBULIN 3.7* 4.0* 4.2*       Radiology:  8/12/2023 3:47 PM     HISTORY/REASON FOR EXAM:  fevers, leukocytosis..  Fever and leukocytosis     Pancreatitis     TECHNIQUE/EXAM DESCRIPTION:  CT scan of the chest, abdomen and pelvis with contrast.     Thin-section helical scanning was obtained with intravenous contrast from the lung apices through the pubic symphysis to include the chest, abdomen and pelvis. 100 mL of Omnipaque 350 nonionic contrast was administered intravenously without complication.     Low dose optimization technique was utilized for this CT exam including automated exposure control and adjustment of the mA and/or kV according to patient size.     COMPARISON: CTA chest dated 5 2023     FINDINGS:  Osseous structures: There is osteoarthritis of the right glenohumeral joint.     There are spinal degenerative changes. There is bilateral sacroiliac joint osteoarthritis.     Chest:     LUNGS: There are multiple areas of linear density consistent with atelectasis. There are patchy airspace process and this could be edema and/or infection.     MEDIASTINUM: There is no adenopathy,  mass, or other abnormality within the axilla, the mediastinum, or the austin.     AORTA: The aorta is normal in appearance.     PLEURA: There is small bilateral pleural effusion, right greater than left.     Abdomen:     LIVER: There is hepatic steatosis. There is hepatomegaly.     SPLEEN: The spleen is normal in appearance.     PANCREAS: AP erect parenchyma enhances normally. There is peripancreatic fluid consistent with acute pancreatitis. There is no loculated fluid collection.        GALLBLADDER and biliary system: Gallbladder contains gallstones. Gallbladder is dilated.     Venous vasculature: The splenic vein and portal vein enhance normally.     ADRENAL GLANDS: The adrenal glands are normal in appearance.     KIDNEYS: The kidneys are normal in appearance.     AORTA: There is aortic atherosclerosis without aneurysm.     BOWEL: No bowel or mesenteric abnormality identified.     Pelvis:     Bladder catheter is present     . There is a small amount of free pelvic fluid.     IMPRESSION:     1.  Acute pancreatitis with peripancreatic fluid but no pancreatic necrosis or pseudocyst    2.  Cholelithiasis     3.  Dilated gallbladder, nonspecific although could indicate cholecystitis in the appropriate clinical setting     4.  Mild bilateral atelectasis and small bilateral pleural effusion     5.  Hepatic steatosis and hepatomegaly     6.  Enteric catheter and bladder catheter appear appropriately located        The radiologist's interpretation of all images has been reviewed by me.     Assessment: This is a 63 y.o. male with presumed gallstone pancreatitis. Recent acute stroke, still resolving actue pancreatitis. No evidence of acute cholecystitis, no abdominal pain, no evidence of inflammation on imaging. Would recommended cholecystectomy this admission when his acute issues are more resolved in order to facilitate a safer surgery and to prevent another event of pancreatitis.     Plan: Surgery will follow, and thank you  very much for this consultation.    Sonam Copeland M.D.  [facility]  [phone]

## 2023-08-13 NOTE — PROGRESS NOTES
Telemetry Shift Summary     Rhythm: SR   HR: 71-92  Ectopy: rPVC, r-fPAC    Measurements: 0.12/0.10/0.32    Normal Values  Rhythm: SR  HR:   Measurements: 0.12-0.20/0.08-0.10/0.30-0.52

## 2023-08-13 NOTE — PROGRESS NOTES
Telemetry Shift Summary      Rhythm: SR/ST  HR:   Ectopy: PAC/PVC     Measurements: 0.14/0.08/0.38     Normal Values  Rhythm: SR  HR:   Measurements: 0.12-0.20/0.08-0.10/0.30-0.52

## 2023-08-13 NOTE — CARE PLAN
Problem: Pain - Standard  Goal: Alleviation of pain or a reduction in pain to the patient’s comfort goal  Outcome: Progressing     Problem: Knowledge Deficit - Standard  Goal: Patient and family/care givers will demonstrate understanding of plan of care, disease process/condition, diagnostic tests and medications  Outcome: Progressing     Problem: Hemodynamics  Goal: Patient's hemodynamics, fluid balance and neurologic status will be stable or improve  Outcome: Progressing     Problem: Respiratory - Stroke Patient  Goal: Patient will achieve/maintain optimum respiratory rate/effort  Outcome: Progressing     Problem: Dysphagia  Goal: Dysphagia will improve  Outcome: Progressing   The patient is Watcher - Medium risk of patient condition declining or worsening    Shift Goals  Clinical Goals: Q2 turis, monitor I/Os  Patient Goals: Rest  Family Goals: sleep    Progress made toward(s) clinical / shift goals:  reoriented pt and updated on plan of care, continue woth Q 2 turns, waffle cushion applied, prevalon boots applied. Pt maintaining oxygen saturation above 90% on 3 liters oxy mask. Will continue to monitor. Pt denies pain at this time. Pt remains free from fevers at this time. Continue tube feed aspiration precautions in place.     Patient is not progressing towards the following goals:

## 2023-08-13 NOTE — CARE PLAN
"The patient is Watcher - Medium risk of patient condition declining or worsening    Shift Goals  Clinical Goals: Monitor patient's comfort  Patient Goals: unable to assess  Problem: Pain - Standard  Goal: Alleviation of pain or a reduction in pain to the patient’s comfort goal  Outcome: Progressing  Note: Patient responds \"no\" when asked if he has any pain. Maintaining patient comfortable by repositioning and frequent communication to assess any discomfort.      Problem: Skin Integrity  Goal: Skin integrity is maintained or improved  Outcome: Progressing  Note: Patient's sacral redness continues to be blanching. Maintaining skin clean and dry.      Problem: Fall Risk  Goal: Patient will remain free from falls  Outcome: Progressing  Note: Patient is close to nurses station with bed in lowest position and locked. Bed alarm on.      Problem: Psychosocial - Patient Condition  Goal: Patient's ability to verbalize feelings about condition will improve  Outcome: Progressing  Note: Patient's ability to verbalize needs is improving. He is better able to express discomfort and needs.      Problem: Neuro Status  Goal: Neuro status will remain stable or improve  Outcome: Progressing  Note: Patient shows increase alertness and appropriate responses. Minimal signs of agitation.       Problem: Urinary Elimination  Goal: Establish and maintain regular urinary output  Outcome: Progressing  Note: Patient is currently utilizing a park catheter and urine output is being monitored. Monitoring output in flow sheets.     Problem: Psychosocial  Goal: Patient's ability to verbalize feelings about condition will improve  Outcome: Progressing  Note: Patient's ability to verbalize needs is improving. He is better able to express discomfort and needs.        Progress made toward(s) clinical / shift goals:  Patient is improving being able to verbalize comfort, discomfort, and needs. Communication improving has allowed for appropriate nursing " interventions to address patients needs.     Patient is not progressing towards the following goals:

## 2023-08-13 NOTE — PROGRESS NOTES
OVERNIGHT HOSPITALIST:    I discussed patient on rounds and was informed about MRI results:    IMPRESSION:     1.  RIGHT basal ganglia lacunar infarction  2.  No hemorrhage  3.  LEFT supratentorial encephalomalacia as described above with Wallerian degeneration in the ipsilateral brainstem  4.  6 mm frontal meningioma  5.  Atrophy  6.  White matter changes      Patient had multiple strokes in the past resulting on Right side hemiparalysis and dysarthria.    I discussed with radiology, the Right  basal ganglia lacunar infarct is very small but  acute . This could explain loss of balance/ coordination and increased confusion, slowed / delayed movements, increased apathy,  making also worse speech. Out of window for interventions at this point.     I called his wife and discussed MRI results in detail.     Continue close monitoring.

## 2023-08-14 NOTE — THERAPY
Physical Therapy   Initial Evaluation     Patient Name: Kobe Spear  Age:  63 y.o., Sex:  male  Medical Record #: [id number]  Today's Date: 8/14/2023     Precautions  Precautions: (P) Fall Risk;Swallow Precautions;Nasogastric Tube  Comments: (P) hx of CVA with R sided deficits/aphasia    Assessment  Patient is 63 y.o. male who was recently admitted for abdominal pain secondary to acute biliary pancreatitis. Pt has possible plan for cholecystectomy this week. Pt has a hx of CVA with R sided deficits at baseline, however, was found to have basal ganglia luncar infarction in recent MRI. Pt presented to PT with impaired balance, impaired gait, weakness overall (greater on R side), poor postural control, and dec activity tolerance. These impairments are limiting this ability to perform bed mobility, sit<>stands, transfers, and ambulation at his PLOF. Pt will continue to benefit from skilled PT while in house, with recommendation for post acute therapy prior to d/c home.     Plan    Physical Therapy Initial Treatment Plan   Treatment Plan : (P) Bed Mobility, Equipment, Gait Training, Manual Therapy, Neuro Re-Education / Balance, Self Care / Home Evaluation, Therapeutic Activities, Therapeutic Exercise  Treatment Frequency: (P) 3 Times per Week  Duration: (P) Until Therapy Goals Met    DC Equipment Recommendations: (P) Unable to determine at this time  Discharge Recommendations: (P) Recommend post-acute placement for additional physical therapy services prior to discharge home    Objective       08/14/23 1344   Initial Contact Note    Initial Contact Note Order Received and Verified, Physical Therapy Evaluation in Progress with Full Report to Follow.   Precautions   Precautions Fall Risk;Swallow Precautions;Nasogastric Tube   Comments hx of CVA with R sided deficits/aphasia   Vitals   O2 (LPM) 3   O2 Delivery Device Oxymask   Pain 0 - 10 Group   Therapist Pain Assessment Nurse Notified;0   Prior Living Situation    Housing / Facility [address]   Steps Into Home 0   Steps In Home 0   Equipment Owned Quad Cane   Lives with - Patient's Self Care Capacity Spouse   Comments spouse works 3x/day and abl to provider intermittent assist; Spouse and pt live in [place]   Prior Level of Functional Mobility   Bed Mobility Independent   Transfer Status Independent   Ambulation Independent   Ambulation Distance   (community)   Assistive Devices Used Quad Cane   Stairs Independent   Comments spouse reports pt was primarily indep with functional mobility, required some assist during ADL's   Cognition    Cognition / Consciousness X   Speech/ Communication Dysarthric;Expressive Aphasia   Level of Consciousness Alert   Comments pleasant/cooperative; able to answer yes or no questions   Strength Upper Body   Upper Body Strength  X   Comments flaccid R UE   Sensation Upper Body   Upper Extremity Sensation  Not Tested   Upper Body Muscle Tone   Upper Body Muscle Tone  Not Tested   Passive ROM Lower Body   Passive ROM Lower Body WDL   Active ROM Lower Body    Active ROM Lower Body  X   Comments limited due to weakness which appears to be baseline from CVA for R LE; WNL for L LE   Strength Lower Body   Lower Body Strength  X   Comments limited in R LE due to weakness secondary to hx of CVA; WNL for L LE   Sensation Lower Body   Lower Extremity Sensation   Not Tested   Lower Body Muscle Tone   Lower Body Muscle Tone  WDL   Neurological Concerns   Neurological Concerns No   Coordination Upper Body   Coordination Not Tested   Coordination Lower Body    Coordination Lower Body  Not Tested   Balance Assessment   Sitting Balance (Static) Fair -   Sitting Balance (Dynamic) Poor   Standing Balance (Static) Trace +   Standing Balance (Dynamic) Trace   Weight Shift Sitting Poor   Weight Shift Standing Absent   Comments w/2 person assist   Bed Mobility    Supine to Sit Moderate Assist   Scooting Moderate Assist   Comments HOB elevated and rails up    Gait Analysis   Gait Level Of Assist Unable to Participate   Weight Bearing Status fwb   Functional Mobility   Sit to Stand Moderate Assist   Bed, Chair, Wheelchair Transfer Maximal Assist   Transfer Method Stand Pivot   Mobility EOB, sit<>stand, tranfser to chair   How much difficulty does the patient currently have...   Turning over in bed (including adjusting bedclothes, sheets and blankets)? 1   Sitting down on and standing up from a chair with arms (e.g., wheelchair, bedside commode, etc.) 1   Moving from lying on back to sitting on the side of the bed? 1   How much help from another person does the patient currently need...   Moving to and from a bed to a chair (including a wheelchair)? 1   Need to walk in a hospital room? 1   Climbing 3-5 steps with a railing? 1   6 clicks Mobility Score 6   Activity Tolerance   Sitting in Chair left up in chair, RN aware   Sitting Edge of Bed 10 mins   Standing 30 seconds   Comments limited due to weakness and fatigue   Edema / Skin Assessment   Edema / Skin  Not Assessed   Patient / Family Goals    Patient / Family Goal #1 to go home   Short Term Goals    Short Term Goal # 1 pt will go supine<>sit w/hob elevated and rails up w/CGA in 6tx   Short Term Goal # 2 pt will go sit<>stand w/LRD w/Min A in 6tx   Short Term Goal # 3 pt will transfer bed<>chair w/LRD w/Min A in 6tx   Education Group   Education Provided Role of Physical Therapist   Role of Physical Therapist Patient Response Patient;Acceptance;Explanation;Demonstration;Verbal Demonstration;Action Demonstration   Physical Therapy Initial Treatment Plan    Treatment Plan  Bed Mobility;Equipment;Gait Training;Manual Therapy;Neuro Re-Education / Balance;Self Care / Home Evaluation;Therapeutic Activities;Therapeutic Exercise   Treatment Frequency 3 Times per Week   Duration Until Therapy Goals Met   Problem List    Problems Impaired Bed Mobility;Impaired Transfers;Impaired Ambulation;Functional Strength Deficit;Impaired  Balance;Impaired Coordination;Decreased Activity Tolerance;Motor Planning / Sequencing   Anticipated Discharge Equipment and Recommendations   DC Equipment Recommendations Unable to determine at this time   Discharge Recommendations Recommend post-acute placement for additional physical therapy services prior to discharge home   Interdisciplinary Plan of Care Collaboration   IDT Collaboration with  Nursing   Patient Position at End of Therapy Seated;Chair Alarm On;Call Light within Reach;Tray Table within Reach;Phone within Reach   Collaboration Comments aware of visit and recs   Session Information   Date / Session Number  8/14-1 (1/3, 8/20)

## 2023-08-14 NOTE — DOCUMENTATION QUERY
[facility]                                                                       Query Response Note      PATIENT:               RHINA LOPEZ  ACCT #:                  [id number]  MRN:                     [id number]  :                      1960  ADMIT DATE:       8/3/2023 6:03 PM  DISCH DATE:          RESPONDING  PROVIDER #:        [id number]           QUERY TEXT:    The diagnosis of sepsis has been documented in the PN  only .     Please clarify the status of sepsis by providing SIRS criteria and sepsis-related organ dysfunction as well as POA status.    Sepsis - real or suspected infection plus 2 or more SIRS criteria + organ dysfunction related to sepsis    Temp <96.8 or >101  HR >90  RR >20  WBC <4,000 or > 12,000  >10% bandemia         The patient's Clinical Indicators include:  Findings:  --Patient admitted for acute biliary pancreatitis without infection or necrosis and symptomatic cholelithiasis  --Per PN , ''Sepsis'' is documented  --VS on admission :  T96.7, P68 RR 18 135/65 98%  --VS on :  T100.6 P108 RR 32 168/88 94% BiPAP  --Labs on admission :  wbc 17.9, absolute neutrophils 14.41  --Labs on : wbc 18.9, procalcitonin 2.66, lactic acid 4.3  --Labs on :  wbc 24.9, absolute neutrophils 23.15, immature granulocytes 0.15, lactic acid 3.3,      procalcitonin 2.80, CRP 19.73  --Blood cxs from  and  neg to date    Treatment:  --Blood cxs  --IVF  --Zosyn 3.375g in NS 100ml IVPB every 8 hours    Risk Factors:  --Acute biliary pancreatitis   --Aspiration PNA  --Lactic acidosis    Thank you,  Lila MEJIAN, RN  Clinical   Connect via [employer] messenger  Options provided:   -- Sepsis is confirmed (please specify SIRS criteria and sepsis-related organ dysfunction as well as POA status), (please specify SIRS criteria and sepsis-related organ dysfunction as well as  POA status)   -- Sepsis is ruled out after further study   -- Other explanation, Please specify      Query created by: Lila Elizabeth on 8/8/2023 12:33 PM    RESPONSE TEXT:    Sepsis is ruled out after further study          Electronically signed by:  JACKSON YATES DO 8/14/2023 2:47 PM

## 2023-08-14 NOTE — PROGRESS NOTES
Telemetry Strip     Strip printed: 1510  Measurements from am strip were as follows:  Rhythm: SR  HR: 73-96  Measurements: 0.14/ 0.08/ 0.36  Ectopy: r PAC, r PVC             Normal Values  Rhythm SR  HR Range    Measurements 0.12-0.20 / 0.06-0.10  / 0.30-0.52

## 2023-08-14 NOTE — CARE PLAN
The patient is Stable - Low risk of patient condition declining or worsening    Shift Goals  Clinical Goals: Maintain skin integrity  Patient Goals: Rest  Family Goals: Brush teeth    Progress made toward(s) clinical / shift goals:    Problem: Knowledge Deficit - Standard  Goal: Patient and family/care givers will demonstrate understanding of plan of care, disease process/condition, diagnostic tests and medications  Outcome: Progressing     Problem: Skin Integrity  Goal: Skin integrity is maintained or improved  Outcome: Progressing     Problem: Fall Risk  Goal: Patient will remain free from falls  Outcome: Progressing     Problem: Neuro Status  Goal: Neuro status will remain stable or improve  Outcome: Progressing  Note: Patient demonstrating improvement in neuro status. Alert and responsive to simple questions asked by staff.      Problem: Urinary Elimination  Goal: Establish and maintain regular urinary output  Outcome: Progressing       Patient is not progressing towards the following goals:

## 2023-08-14 NOTE — PROGRESS NOTES
"  DATE: 8/14/2023        INTERVAL EVENTS:  No acute changes  Patient denies abdominal pain  Tube feeds at goal-tolerating      PHYSICAL EXAMINATION:  Vital Signs: BP (!) 149/57   Pulse 69   Temp 37.7 °C (99.8 °F) (Axillary)   Resp 18   Ht 1.753 m (5' 9\")   Wt 102 kg (224 lb 13.9 oz)   SpO2 96%   Physical Exam  NAD  Slight increased work of breathing  Abdomen soft, non-tender  LABORATORY VALUES:  Recent Labs     08/12/23  0010 08/13/23 0230 08/14/23  0147   WBC 21.2* 23.7* 17.6*   RBC 5.10 4.82 4.22*   HEMOGLOBIN 13.7* 12.8* 11.5*   HEMATOCRIT 45.1 43.5 38.7*   MCV 88.4 90.2 91.7   MCH 26.9* 26.6* 27.3   MCHC 30.4* 29.4* 29.7*   RDW 51.0* 52.6* 53.4*   PLATELETCT 320 288 262   MPV 11.0 11.5 11.7     Recent Labs     08/12/23  0010 08/13/23  0230 08/14/23  0147   SODIUM 150* 152* 149*   POTASSIUM 3.6 3.7 3.8   CHLORIDE 110 113* 111   CO2 29 30 29   GLUCOSE 249* 252* 327*   BUN 35* 36* 28*   CREATININE 1.11 0.93 0.88   CALCIUM 8.2* 8.1* 8.1*     Recent Labs     08/12/23  0010 08/13/23  0230 08/14/23  0147   ASTSGOT 45 63* 47*   ALTSGPT 84* 71* 60*   TBILIRUBIN 0.8 0.6 0.5   ALKPHOSPHAT 138* 125* 101*   GLOBULIN 4.0* 4.2* 3.9*           IMAGING:  DX-CHEST-PORTABLE (1 VIEW)   Final Result         Diffuse interstitial prominence could relate to mild fluid overload.      Bilateral pleural effusion with bibasilar atelectasis.      MR-BRAIN-WITH & W/O   Final Result      1.  RIGHT basal ganglia lacunar infarction   2.  No hemorrhage   3.  LEFT supratentorial encephalomalacia as described above with Wallerian degeneration in the ipsilateral brainstem   4.  6 mm frontal meningioma   5.  Atrophy   6.  White matter changes      CT-CHEST,ABDOMEN,PELVIS WITH   Final Result      1.  Acute pancreatitis with peripancreatic fluid but no pancreatic necrosis or pseudocyst      2.  Cholelithiasis      3.  Dilated gallbladder, nonspecific although could indicate cholecystitis in the appropriate clinical setting      4.  Mild " bilateral atelectasis and small bilateral pleural effusion      5.  Hepatic steatosis and hepatomegaly      6.  Enteric catheter and bladder catheter appear appropriately located      DX-CHEST-PORTABLE (1 VIEW)   Final Result      1.  Bibasilar atelectasis, less likely pneumonitis.      2.  Small bilateral pleural effusions.      DX-CHEST-LIMITED (1 VIEW)   Final Result      Stable bilateral atelectasis/consolidation.      [id number] VIEW   Final Result      NG tube tip overlies the gastric antrum.      DX-ABDOMEN FOR TUBE PLACEMENT   Final Result      Malpositioned nasogastric tube. The tube extends to the gastroesophageal junction and then reverses course with tip located within the upper thoracic esophagus.      A report was called to the patient's ICU nurse at 2:55 PM on 8/9/2023      US-RUQ   Final Result      1.  Gallstones within the gallbladder. No evidence of biliary ductal dilatation.      2.  The liver is echogenic consistent with fatty change versus hepatocellular dysfunction.      DX-ABDOMEN FOR TUBE PLACEMENT   Final Result      1.  NG tube courses into the fundus of the stomach      DX-CHEST-LIMITED (1 VIEW)   Final Result         Groundglass and airspace opacities bilaterally, slightly worse than prior      DX-CHEST-PORTABLE (1 VIEW)   Final Result      1.  Increased patchy bilateral airspace opacities.   2.  Probable small bilateral pleural effusions.   3.  Interval placement of an enteric feeding tube which courses towards the stomach with the distal tip not visualized.   4.  Stable enlargement of the cardiomediastinal silhouette.      DX-ABDOMEN FOR TUBE PLACEMENT   Final Result      NG tube tip overlies the gastric body.      EC-ECHOCARDIOGRAM COMPLETE W/ CONT   Final Result      DX-CHEST-PORTABLE (1 VIEW)   Final Result      Bibasilar atelectasis and small bilateral pleural effusions, similar to prior study.         CT-CTA CHEST PULMONARY ARTERY W/ RECONS   Final Result         1. No pulmonary  embolus.   2. Mild bibasilar airspace disease, atelectasis versus infiltrate   3. Small simple right pleural effusion.   4. Mild ascites in the upper quadrant.   5. Minimal subsegmental atelectasis in upper lung zones.            DX-CHEST-PORTABLE (1 VIEW)   Final Result      No significant change from prior exam.      [id number]-W/O   Final Result         1. No choledocholithiasis. No CBD dilatation.      2. Cholelithiasis.      3. Fluid surrounding the pancreas, in keeping with acute pancreatitis.      US-RUQ   Final Result      1.  Hepatic steatosis.      2.  Cholelithiasis.      CT-ABDOMEN-PELVIS WITH   Final Result         1.  Cholelithiasis.      2.  Marked acute pancreatitis      DX-CHEST-PORTABLE (1 VIEW)   Final Result      Hypoinflation with borderline cardiomegaly. No lobar consolidation or large pleural effusions.             ASSESSMENT AND PLAN:    Gallstone pancreatitis, no clinical sign of sepsis  CVA; reportedly 10 years ago, MRI negative for recent stroke  -Plavix/asa held, will bridge with heparin  Stable from medical standpoint  Will schedule cholecystectomy this week             ____________________________________     Lorraine Eagle M.D.    DD: 8/14/2023  12:24 PM

## 2023-08-14 NOTE — CARE PLAN
Problem: Knowledge Deficit - Standard  Goal: Patient and family/care givers will demonstrate understanding of plan of care, disease process/condition, diagnostic tests and medications  Outcome: Progressing     Problem: Hemodynamics  Goal: Patient's hemodynamics, fluid balance and neurologic status will be stable or improve  Outcome: Progressing     Problem: Respiratory  Goal: Patient will achieve/maintain optimum respiratory ventilation and gas exchange  Outcome: Progressing   The patient is Stable - Low risk of patient condition declining or worsening    Shift Goals  Clinical Goals: Q 2 turns, control BP  Patient Goals: get up to chair  Family Goals: Brush teeth    Progress made toward(s) clinical / shift goals:  updated pt on plan of care, pt able to verbalize some words and responds to yes/no questions. Pt with elevated BP today medicated per MAR will continue to monitor.     Patient is not progressing towards the following goals:

## 2023-08-14 NOTE — PROGRESS NOTES
Telemetry Shift Summary     Rhythm: SR  HR: 68-83  Ectopy: rPVC, rPAC    Measurements: 0.14/0.10/0.40    Normal Values  Rhythm: SR  HR:   Measurements: 0.12-0.20/0.08-0.10/0.30-0.52

## 2023-08-14 NOTE — THERAPY
Speech Language Therapy Contact Note    Patient Name: Kobe Spear  Age:  63 y.o., Sex:  male  Medical Record #: [id number]  Today's Date: 8/14/2023    Discussed missed therapy with RN.       08/14/23 0910   Treatment Variance   Reason For Missed Therapy Medical - Patient Is Not Medically Stable   Interdisciplinary Plan of Care Collaboration   Collaboration Comments RN cleared pt to be seen and stated he was asking for water. She had not given him any ice chips. Pt was alert for session but visibly breathing hard on 1L of O2 via mask. He nodded yes to feeling SOB despite saturation of 93. His O2 was increased to 3L and his O2 saturation improved to 95 but his breathing continued to be laborious and he was unable to slow his rate as he continued to feel SOB. Pt was educated on coordination of breathing/swallowing function and how trials at this time were contraindicated. He nodded his agreement. RN notified of concerns and she reported she would reach out to MD.

## 2023-08-14 NOTE — PROGRESS NOTES
Hospital Medicine Daily Progress Note    Date of Service  8/14/2023    Chief Complaint  Kobe Spear is a 63 y.o. male admitted 8/3/2023 with abdominal pain.    Hospital Course  History of CVA, CKD stage III, DM2, GERD who is traveling through [place] and developed sudden onset severe abdominal pain and presented to the ER where he was found to have markedly elevated LFTs and lipase highly suggestive of gallstone pancreatitis.  Right upper quadrant ultrasound revealed cholelithiasis however no CBD dilation.  General surgery was consulted and recommended MRCP.  He was started on empiric IV antibiotic therapy due to marked leukocytosis .  MRCP showed no evidence of choledocholithiasis only cholelithiasis.  And confirmed acute pancreatitis.  Patient became markedly agitated and was upgraded to the ICU so Precedex could be used.  He was in the ICU 8/5 through 8/10.  He also had respiratory failure secondary to the agitation and required BiPAP high flow nasal cannula.  He has been weaned from this and is now down to 4 L nasal cannula and saturating well.  He remains very anxious and constantly moving and thus wife is at bedside.    Interval Problem Update  8/11 patient anxious and required restraints overnight.  Seen with wife at bedside.  Patient has no abdominal pain and is tolerating NG tube feeds well.  He is very anxious and tachypneic at time of examination however with repositioning his tachypnea resolved.  He told me he wanted to try and eat cheese today.  He is not having any neurologic changes and the right upper extremity weakness is the same per wife at bedside.  Leukocytosis persists and patient is having elevated temperature.  He had blood cultures done on 8/6 which came back negative.  I will get a chest x-ray as well as repeat blood cultures at this time.  Addendum:  patient with decompensation and higher temp, hr and RR this afternoon.  CXR stable, concern he is again septic.  Blood cultures, lactic  acid, and UA pending.  Discussed with Dr Campa, low threshold to place back in the ICU.  I've added zyvox for broader coverage and will continue with zosyn.    8/12 -patient appears similar to how he did yesterday afternoon.  He had breaking of the fever yesterday but had another fever again today up to 102.  Chest x-ray was done which showed stable changes and actually overall improvement from earlier in hospital stay.  Sodium is elevated at 150 and will start half NS to help bring the sodium down.  CT chest abdomen pelvis currently pending to evaluate for any possible evidence of occult infection.  MRI of the brain given his stroke history and increased lethargy recently.  Zyvox initiated yesterday but WBC count is up to 21.2 this morning.  Blood cultures drawn from yesterday are showing no evidence of growth.  UA is not consistent with infection.  Given his known gallstones there may be occult infection there however he has been on Zosyn with a WBC count that was trending down.  Though there is no evidence on chest x-ray to suspect COVID given his high fevers I will check a respiratory PCR.  8/13 patient actually looks better today compared to the last 36 hours.  He states he has no pain and both I and general surgery palpated his right upper quadrant and he is pain-free at this time.  Having not received any pain medications.  Blood pressure still remains high but has improved with the adjustment of the medications.  His sodium is elevated at 152 and I have started him on D5W as well as increase his free water flushes.  With the initiation of D5 I have also increased his sliding scale insulin coverage.  Patient has been afebrile for the last 24 hours.  We will continue with same antibiotics at this time.  Appreciate general surgery consultation.  8/14 patient much more coherent today and able to have more than 1 word answers when questions were asked.  PT OT to work with him to see how he does when he gets out  of bed.  General surgery planning on cholecystectomy sometime this week when appropriate.  WBC count is trending down finally and is down to 17.6.  Sodium at 149 from 152 yesterday however sugars are very high and I will discontinue the D5W and just work with free water.  He is starting to show early signs of volume overload thus the discontinuation of fluids.  Blood pressure still is very difficult to control therefore I we will add an additional Norvasc 10 mg daily.  He is still requiring as needed medications for his blood pressure.    I have discussed this patient's plan of care and discharge plan at IDT rounds today with Case Management, Nursing, Nursing leadership, and other members of the IDT team.    Consultants/Specialty  General surgery    Code Status  Full Code    Disposition  The patient is not medically cleared for discharge to home or a post-acute facility.  Anticipate discharge to: skilled nursing facility    I have placed the appropriate orders for post-discharge needs.    Review of Systems  Review of Systems   Constitutional:  Negative for chills and fever.   HENT:  Negative for congestion and sore throat.    Eyes:  Negative for blurred vision and photophobia.   Respiratory:  Negative for cough and shortness of breath.    Cardiovascular:  Negative for chest pain, claudication and leg swelling.   Gastrointestinal:  Negative for abdominal pain, constipation, diarrhea, heartburn, nausea and vomiting.   Genitourinary:  Negative for dysuria and hematuria.   Musculoskeletal:  Negative for joint pain and myalgias.   Skin:  Negative for itching and rash.   Neurological:  Negative for dizziness, sensory change, speech change, weakness and headaches.   Psychiatric/Behavioral:  Negative for depression. The patient is not nervous/anxious and does not have insomnia.         Physical Exam  Temp:  [36.2 °C (97.2 °F)-37.7 °C (99.8 °F)] 37.2 °C (98.9 °F)  Pulse:  [69-93] 78  Resp:  [16-18] 16  BP: (136-191)/(52-74)  168/66  SpO2:  [89 %-99 %] 97 %    Physical Exam  Vitals and nursing note reviewed.   Constitutional:       General: He is not in acute distress.     Appearance: Normal appearance. He is not diaphoretic.   HENT:      Head: Normocephalic and atraumatic.   Eyes:      General: No scleral icterus.     Extraocular Movements: Extraocular movements intact.   Cardiovascular:      Rate and Rhythm: Normal rate and regular rhythm.      Pulses: Normal pulses.      Heart sounds: Normal heart sounds. No murmur heard.  Pulmonary:      Effort: Pulmonary effort is normal. No respiratory distress.      Breath sounds: Normal breath sounds. No wheezing, rhonchi or rales.      Comments: Intermittently tachypneic when anxious  Abdominal:      General: Abdomen is flat. Bowel sounds are normal. There is no distension.      Palpations: Abdomen is soft.      Tenderness: There is no abdominal tenderness. There is no guarding or rebound.   Musculoskeletal:         General: No swelling or tenderness.      Cervical back: Normal range of motion and neck supple.   Lymphadenopathy:      Cervical: No cervical adenopathy.   Skin:     Coloration: Skin is not jaundiced.      Findings: No erythema.   Neurological:      General: No focal deficit present.      Mental Status: He is alert and oriented to person, place, and time. Mental status is at baseline.      Cranial Nerves: No cranial nerve deficit.      Motor: No weakness (Right-sided arm hemiparesis).   Psychiatric:         Mood and Affect: Mood normal.         Fluids    Intake/Output Summary (Last 24 hours) at 8/14/2023 1401  Last data filed at 8/14/2023 1200  Gross per 24 hour   Intake 1500 ml   Output 700 ml   Net 800 ml         Laboratory  Recent Labs     08/12/23  0010 08/13/23  0230 08/14/23  0147   WBC 21.2* 23.7* 17.6*   RBC 5.10 4.82 4.22*   HEMOGLOBIN 13.7* 12.8* 11.5*   HEMATOCRIT 45.1 43.5 38.7*   MCV 88.4 90.2 91.7   MCH 26.9* 26.6* 27.3   MCHC 30.4* 29.4* 29.7*   RDW 51.0* 52.6* 53.4*    PLATELETCT 320 288 262   MPV 11.0 11.5 11.7       Recent Labs     08/12/23  0010 08/13/23  0230 08/14/23  0147   SODIUM 150* 152* 149*   POTASSIUM 3.6 3.7 3.8   CHLORIDE 110 113* 111   CO2 29 30 29   GLUCOSE 249* 252* 327*   BUN 35* 36* 28*   CREATININE 1.11 0.93 0.88   CALCIUM 8.2* 8.1* 8.1*                     Imaging  DX-CHEST-PORTABLE (1 VIEW)   Final Result         Diffuse interstitial prominence could relate to mild fluid overload.      Bilateral pleural effusion with bibasilar atelectasis.      MR-BRAIN-WITH & W/O   Final Result      1.  RIGHT basal ganglia lacunar infarction   2.  No hemorrhage   3.  LEFT supratentorial encephalomalacia as described above with Wallerian degeneration in the ipsilateral brainstem   4.  6 mm frontal meningioma   5.  Atrophy   6.  White matter changes      CT-CHEST,ABDOMEN,PELVIS WITH   Final Result      1.  Acute pancreatitis with peripancreatic fluid but no pancreatic necrosis or pseudocyst      2.  Cholelithiasis      3.  Dilated gallbladder, nonspecific although could indicate cholecystitis in the appropriate clinical setting      4.  Mild bilateral atelectasis and small bilateral pleural effusion      5.  Hepatic steatosis and hepatomegaly      6.  Enteric catheter and bladder catheter appear appropriately located      DX-CHEST-PORTABLE (1 VIEW)   Final Result      1.  Bibasilar atelectasis, less likely pneumonitis.      2.  Small bilateral pleural effusions.      DX-CHEST-LIMITED (1 VIEW)   Final Result      Stable bilateral atelectasis/consolidation.      [id number] VIEW   Final Result      NG tube tip overlies the gastric antrum.      DX-ABDOMEN FOR TUBE PLACEMENT   Final Result      Malpositioned nasogastric tube. The tube extends to the gastroesophageal junction and then reverses course with tip located within the upper thoracic esophagus.      A report was called to the patient's ICU nurse at 2:55 PM on 8/9/2023      US-RUQ   Final Result      1.  Gallstones  within the gallbladder. No evidence of biliary ductal dilatation.      2.  The liver is echogenic consistent with fatty change versus hepatocellular dysfunction.      DX-ABDOMEN FOR TUBE PLACEMENT   Final Result      1.  NG tube courses into the fundus of the stomach      DX-CHEST-LIMITED (1 VIEW)   Final Result         Groundglass and airspace opacities bilaterally, slightly worse than prior      DX-CHEST-PORTABLE (1 VIEW)   Final Result      1.  Increased patchy bilateral airspace opacities.   2.  Probable small bilateral pleural effusions.   3.  Interval placement of an enteric feeding tube which courses towards the stomach with the distal tip not visualized.   4.  Stable enlargement of the cardiomediastinal silhouette.      DX-ABDOMEN FOR TUBE PLACEMENT   Final Result      NG tube tip overlies the gastric body.      EC-ECHOCARDIOGRAM COMPLETE W/ CONT   Final Result      DX-CHEST-PORTABLE (1 VIEW)   Final Result      Bibasilar atelectasis and small bilateral pleural effusions, similar to prior study.         CT-CTA CHEST PULMONARY ARTERY W/ RECONS   Final Result         1. No pulmonary embolus.   2. Mild bibasilar airspace disease, atelectasis versus infiltrate   3. Small simple right pleural effusion.   4. Mild ascites in the upper quadrant.   5. Minimal subsegmental atelectasis in upper lung zones.            DX-CHEST-PORTABLE (1 VIEW)   Final Result      No significant change from prior exam.      EC-[id number]-L/O   Final Result         1. No choledocholithiasis. No CBD dilatation.      2. Cholelithiasis.      3. Fluid surrounding the pancreas, in keeping with acute pancreatitis.      US-RUQ   Final Result      1.  Hepatic steatosis.      2.  Cholelithiasis.      CT-ABDOMEN-PELVIS WITH   Final Result         1.  Cholelithiasis.      2.  Marked acute pancreatitis      DX-CHEST-PORTABLE (1 VIEW)   Final Result      Hypoinflation with borderline cardiomegaly. No lobar consolidation or large pleural effusions.                 Assessment/Plan  * Acute biliary pancreatitis without infection or necrosis- (present on admission)  Assessment & Plan  Patient without any further abdominal pain.  Is taking in nutrition via tube feeds through his NG tube as he is not yet safe to swallow.  Last lipase 67, radiographic findings on CT still showing significant pancreatitis with peripancreatic fluid, no evidence of necrosis or pseudocyst      History of CVA (cerebrovascular accident)  Assessment & Plan  Residual right arm hemiparesis  Aspirin/Plavix to remain on hold as he will need upcoming surgery and this would delay it further.  Please caution with mobilizing  PT OT to continue  MRI brain shows a right basal ganglia lacunar infarct, no hemorrhage and left supratentorial encephalomalacia likely related to his previous strokes.  Resume statin via NG tube    Stage 3a chronic kidney disease (HCC)- (present on admission)  Assessment & Plan  Avoid/minimize nephrotoxins as much as possible, renally dose medications, monitor inputs and outputs     Type 2 diabetes mellitus with kidney complication, without long-term current use of insulin (HCC)- (present on admission)  Assessment & Plan  Sliding scale insulin as needed  High sugars when he was on D5W, I have since discontinued this  High insulin sliding scale coverage      Leukocytosis- (present on admission)  Assessment & Plan  WBC count 17.6  Day 10 of Zosyn today, Zyvox added 3 days ago  CT chest abdomen pelvis just shows the known gallstones, dilated gallbladder and pancreatitis with peripancreatic fluid, no evidence of abscess or other etiology to explain fevers, leukocytosis  MRI brain did show a small right-sided lacunar infarct, this should not have resulted in leukocytosis or fevers.  Procalcitonin 0.81  Chest x-ray shows stable edema, improving overall  UA not consistent with infection  Blood cultures collected 8/11 and currently showing no growth    Gastroesophageal reflux disease  without esophagitis- (present on admission)  Assessment & Plan  Resume omeprazole    Anxiety- (present on admission)  Assessment & Plan  Resume fluoxetine via NG tube  The acute withdrawal of this medication could be contributing to some of his confusion.      Symptomatic cholelithiasis- (present on admission)  Assessment & Plan  Repeat CT chest abdomen pelvis shows no evidence of abscess, he still has significant pancreatitis and peripancreatic fluid but his gallbladder with palpation is no longer tender.  General surgery was consulted and does not feel that the fevers and the confusion, impulsivity and WBC count is related to an acute cholecystitis.  The patient will need to have his gallbladder removed prior to discharge from the hospital.  General surgery would like him in a more stable condition before taking him to the OR.         VTE prophylaxis:    heparin ppx      I have performed a physical exam and reviewed and updated ROS and Plan today (8/14/2023). In review of yesterday's note (8/13/2023), there are no changes except as documented above.

## 2023-08-15 PROBLEM — I63.9 CVA (CEREBRAL VASCULAR ACCIDENT) (HCC): Status: ACTIVE | Noted: 2023-01-01

## 2023-08-15 PROBLEM — E87.70 VOLUME OVERLOAD: Status: ACTIVE | Noted: 2023-01-01

## 2023-08-15 NOTE — THERAPY
Speech Language Pathology   Flexible Endoscopic Evaluation of Swallowing (FEES)        Patient Name: Kobe Spear  AGE:  63 y.o., SEX:  male  Medical Record #: [id number]  Date of Service: 8/15/2023      History of Present Illness  Pt admitted 8/3 with abdominal pain while here from [place] visiting with family. Found to have acute pancreatitis w/out infection and symptomatic cholelithiasis. Transferred to ICU the night of 8/5 d/t escalating WBC count respiratory failure w/ possible aspiration.      PMHx of HTN, diabetes, HLD, CVA w/ residual R-sided weakness. No previous SLP notes found in EMR.      Chest CT 8/5:    1. No pulmonary embolus.  2. Mild bibasilar airspace disease, atelectasis versus infiltrate  3. Small simple right pleural effusion.  4. Mild ascites in the upper quadrant.  5. Minimal subsegmental atelectasis in upper lung zones.       CXR 8/14/23:  Diffuse interstitial prominence could relate to mild fluid overload.  Bilateral pleural effusion with bibasilar atelectasis.      MRI of brain 8/12/23:  1.  RIGHT basal ganglia lacunar infarction  2.  No hemorrhage  3.  LEFT supratentorial encephalomalacia as described above with Wallerian degeneration in the ipsilateral brainstem  4.  6 mm frontal meningioma  5.  Atrophy  6.  White matter changes        Pertinent Information  Current Method of Nutrition: NGT  Patient Behaviors: Fatigue   Dentition: Good, Natural dentition   Feeding Tube: NGT intact to right nare   Tracheostomy: No               Factor(s) Affecting Performance: Impaired endurance     Discussed the risks, benefits, and alternatives of the FEES procedure. Patient/family acknowledged and agreed to proceed.      Assessment  Flexible Endoscopic Evaluation of Swallowing (FEES) completed at bedside today. The endoscope was passed transnasally via Left nare to evaluate the anatomy and physiology of swallowing. Pt tolerated the procedure with no apparent distress.    Of note, significant lag  when recording; therefore, unable to save study to review after frame by frame. Tech assisted SLP in writing notes during study but report is limited d/t same.       Anatomic Findings: WFL  Vocal Fold Motion: Bilateral movement  Secretion Management: Adequate  PO Trials: Ice Chips, Thin Liquid, Mildly Thick Liquid, Pudding, Soft & Bite Sized      Consistency PAS Score Timing Residue Comments   Thin Liquid 3 Pre Swallow, During swallow Vallecular Residue: Mild (5%-25%)  Pyriform Sinus Residue: Mild (5%-25%) Inconsistent PAS 3 w/ cup sips    Mildly Thick 3 During Swallow, Post Swallow Vallecular Residue: Trace (1%-5%)  Pyriform Sinus Residue: Trace (1%-5%) PAS 1 cup x3, straw x1  PAS 3 straw x1   Pudding 1 N/A Vallecular Residue: Mild (5%-25%)  Pyriform Sinus Residue: Mild (5%-25%)    Soft & Bite Sized 1 N/A Vallecular Residue: Mild (5%-25%)  Pyriform Sinus Residue: Mild (5%-25%)      Penetration-Aspiration Scale (PAS)  1     No contrast enters airway  2     Contrast enters the airway, remains above the vocal folds, and is ejected from the airway (not seen in the airway at the end of the swallow).  3     Contrast enters the airway, remains above the vocal folds, and is not ejected from the airway (is seen in the airway after the swallow).  4     Contrast enters the airway, contacts the vocal folds, and is ejected from the airway.  5     Contrast enters the airway, contacts the vocal folds, and is not ejected from the airway  6     Contrast enters the airway, crosses the plane of the vocal folds, and is ejected from the airway.  7     Contrast enters the airway, crosses the plane of the vocal folds, and is not ejected from the airway despite effort.  8     Contrast enters the airway, crosses the plane of the vocal folds, is not ejected from the airway and there is no response to aspiration.      Oral phase:   Intermittent oral holding with manipulation of ice chip (?suspect r/t incoordination of breath-swallow  pattern)  Prolonged mastication and delayed A-P transit of soft solids  Reduced bolus control resulted in laryngeal penetration before the swallow into laryngeal vestibule x1 following trials of thins via cup      Pharyngeal phase:  Reduced pharyngeal shortening resulted in chronic pooling in pyriform sinuses (mild-mod, R>L).   Mildly reduced tongue base retraction resulted in mild vallecular residue across all trials.   Reduced timing of laryngeal vestibule closure resulted in inconsistent laryngeal penetration following trials of thin liquids via cup/straw and MTL via straw. Trace amount of residue remained high in laryngeal vestibule (with the exception of one instance following thin liquid wash where residue approximated vocal cords).       Esophageal phase:  Mild retrograde flow through UES x1 following trials of thin liquids concerning for reflux. Residue remained in pyriform sinuses and did not enter airway. RN reported pt is on Pepcid.       Compensatory Strategies:  A cued second swallow was significantly prolonged and eventually ineffective in clearing pharyngeal residue.  Multiple liquid washes eliminated vallecular residue but not residue in pyriform sinuses following soft solids.       Severity Rating:  MARIELENA: Mild      Clinical Impressions  The pt presents with a mild oropharyngeal dysphagia suspect acute r/t ARF, acute-on-chronic CVA, fatigue/weakness from illness, and prolonged NPO status. Swallow safety and efficiency are reduced but likely mitigated with short term modified diet. Anticipate resolution of dysphagia with ongoing medical management and improved strength and endurance. Patient is a fair-good candidate for behavioral and exercise-based SLP services. Swallow prognosis is good given good spousal support and improving mentation and mobility.       Recommendations  Diet Consistency: Pureed and Mildly Thick Liquid diet   -NGT for supplemental nutrition (per RD or MD recs)  -OK for sips of H20  between meals following oral care  Medication: Whole with puree, Non Oral  Supervision: 1:1 feeding with constant supervision  Positioning: Fully upright and midline during oral intake, Remain upright for 30-45 minutes after oral intake  Strategies: Small bites/sips, Slow rate of intake, No straws  Oral Care: Q4h  Additional Instrumentation: Repeat diagnostic study when clinically appropriate         SLP Treatment Plan  Treatment Plan: Dysphagia Treatment, Patient/Family/Caregiver Training  SLP Frequency: 3x Per Week  Estimated Duration: Until Therapy Goals Met      Anticipated Discharge Needs  Discharge Recommendations: Recommend home health for continued speech therapy services   Therapy Recommendations Upon DC: Dysphagia Training, Patient / Family / Caregiver Education       Patient / Family Goals  Patient / Family Goal #1: Pt nodded head to being asked if he was hungry and thirsty  Goal #1 Outcome: Goal met  Short Term Goal # 1: Patient will consume prefeeding trials with SLP only with no overt s/sx of aspiration or decline in respiratory status.  Goal Outcome # 1: Goal met, new goal added  Short Term Goal # 1 B : Patient will consume PU/MT diet with no overt s/sx of aspiration given 1:1 feeding and adherence to swallow and reflux precautions.  Short Term Goal # 2: Patient will tolerate sips of thin liquids with no overt s/sx of aspiration.      Salena Landa, SLP

## 2023-08-15 NOTE — DIETARY
Nutrition support weekly update:  Day 12 of admit.  Kobe Spear is a 63 y.o. male with admitting DX of Symptomatic cholelithiasis.      Tube feeding initiated on 8/9/23. Current TF via NG tube is Impact Peptide 1.5 at a goal rate of 55 mL/hour providing 1980 kcals, 124 g protein, 184 g of CHO, and 1016 mL of free water.     Assessment:  Weight 8/13/23: 102 kg  Weights variable but overall trend is that pt's wt has been stable.   Re-estimate of nutritional needs as indicated: not indicated     Evaluation:   Labs: POC glucose: >200. 8/14/23: phos=2.4, mag=2.5, lipase=75 (trending up). 8/15: sodium=145 (improved), potassium=3.7, Bun=26 (trending down)   Meds: Pepcid, Lasix, SSI, Zosyn, thiamine  IV fluids: D5 stopped yesterday  Cholecystectomy planned for 8/16.   Nurse requesting change of tube feed to formula w/ less CHO. Change of tube feed to Diabetisource AC is appropriate. Diabetisource AC at a goal rate of 70 mL/hour will provide 2016 kcals, 101 g of protein, 168 g of CHO, and 1378 mL of free water. This will provide 16 g less CHO and 25 g of fiber.       Malnutrition risk: ASPEN criteria not met    Recommendations/Plan:  Change tube feed to Diabetisouce AC. Start slowly at 25 mL/hour and advance per protocol to a goal rate of 70 mL/hour.  Additional fluids per MD  Monitor weights    RD following.

## 2023-08-15 NOTE — THERAPY
Speech Language Pathology   Daily Treatment     Patient Name: Kobe Spear  AGE:  63 y.o., SEX:  male  Medical Record #: [id number]  Date of Service: 8/15/2023    Precautions: Fall Risk, Nasogastric Tube, Swallow Precautions       Subjective  Patient seen on this date for dysphagia reassessment. Patient awake, AAOx2, with improved but ongoing expressive aphasia. Pt with clavicular breathing with use of accessory muscles. Later SO would report this has been baseline for years, including mouth breathing. Mild-mod xerostomia noted and pt eager for ice chips and water.       Assessment  PO trials administered consisted of ice chips, water, and applesauce. Onset of swallow trigger was mildly delayed. No overt s/sx of aspiration appreciated. However, pt appeared to fatigue quickly and requested breaks between PO trials. Patient performed 5 reps effortful swallow with poor-fair accuracy. Education provided to pt and spouse regarding current status and indication for FEES. Pt and spouse agreeable.       Clinical Impressions  Patient is presenting with multiple dysphagia risk factors including: history of CVA w/ right sided weakness, fatigue/weakness from illness, prolonged NPO status, GERD, and ARF. A diagnostic swallow study is indicated to further assess oropharyngeal function and determine safest means for nutrition.       Recommendations  Diet Consistency: NPO/NGT  Instrumentation: FEES  Medication: Non Oral  Oral Care: Q4h        SLP Treatment Plan  Treatment Plan: Dysphagia Treatment, Patient/Family/Caregiver Training  SLP Frequency: 3x Per Week  Estimated Duration: Until Therapy Goals Met      Anticipated Discharge Needs  Discharge Recommendations: Recommend post-acute placement for additional speech therapy services prior to discharge home  Therapy Recommendations Upon DC: Dysphagia Training, Patient / Family / Caregiver Education      Patient / Family Goals  Patient / Family Goal #1: Pt nodded head to being  asked if he was hungry and thirsty  Goal #1 Outcome: Progressing as expected  Short Term Goals  Short Term Goal # 1: Patient will consume prefeeding trials with SLP only with no overt s/sx of aspiration or decline in respiratory status.  Goal Outcome # 1: Progressing as expected      Salena Landa, SLP

## 2023-08-15 NOTE — PROGRESS NOTES
Speech performed FEES and pt passed.  TF flushed and shut off to encourage hunger for lunch.      Lunch came very late at 1250.  Pt moved to the chair using the jessica steady as 2 person assist and he feed himself 75+% of pureed lunch as a 1 to 1 feed; liquids need to moderately thickened.  Pt sat up for over 45 minutes and then returned back to bed.  Antibiotics started.      Reentered npo order that surgery placed at 0800 as speech modified in order to place a diet.

## 2023-08-15 NOTE — THERAPY
Occupational Therapy   Initial Evaluation     Patient Name: Kobe Spear  Age:  63 y.o., Sex:  male  Medical Record #: [id number]  Today's Date: 8/14/2023     Precautions: (P) Fall Risk, Swallow Precautions, Nasogastric Tube  Comments: hx of CVA with R sided deficits/aphasia    Assessment  Patient is 63 y.o. male admitted with abd pain; acute biliary pancreatitis. Possible pending choley this week. Recent MRI shows new basal ganglia lacunar infarct. TF, park in place. Hx of CVA (10 yrs ago) with R sided weakness and aphasia, CKD stage III, DM2, GERD. Pt and wife live in [place]; pt indep with all ADL's except for showering and some dressing. Uses quad cane outdoors, quite active prior. Spouse drives and performs most IADL's. Pt presents A&Ox2, aphasia, mild confusion; agreeable for EOB/OOB. Currently pt with flaccid RUE, impaired balance, impaired strength/coordination, decreased activity tolerance to complete ADL's as at prior level; see below for CLOF. UIC post session. OT will follow while in house              Plan  Occupational Therapy Initial Treatment Plan   Treatment Interventions: Self Care / Activities of Daily Living, Neuro Re-Education / Balance, Therapeutic Activity  Treatment Frequency: 3 Times per Week  Duration: Until Therapy Goals Met    DC Equipment Recommendations: Unable to determine at this time  Discharge Recommendations: Recommend post-acute placement for additional occupational therapy services prior to discharge home     Subjective  Agreeable     Objective     08/14/23 1401   Prior Living Situation   Prior Services Intermittent Physical Support for ADL Per Family   Housing / Facility 1 Story House   Steps Into Home 0   Steps In Home 0   Bathroom Set up Bathtub / Shower Combination;Shower Chair   Equipment Owned Ramp;Quad Cane;Tub / Shower Seat;Hand Held Shower   Lives with - Patient's Self Care Capacity Spouse   Prior Level of ADL Function   Self Feeding Independent   Grooming / Hygiene  Independent   Bathing Requires Assist   Dressing Requires Assist   Toileting Independent   Prior Level of IADL Function   Medication Management Independent   Laundry Requires Assist   Kitchen Mobility Independent   Finances Unable To Determine At This Time   Home Management Independent   Shopping Requires Assist   Prior Level Of Mobility Independent Without Device in Home   Driving / Transportation Relatives / Others Provide Transportation   Occupation (Pre-Hospital Vocational) Retired Due To Age   Leisure Interests Hobbies   Precautions   Precautions Fall Risk;Swallow Precautions;Nasogastric Tube   Vitals   O2 (LPM) 3   O2 Delivery Device Oxymask   Pain 0 - 10 Group   Therapist Pain Assessment Nurse Notified;0   Cognition    Cognition / Consciousness X   Speech/ Communication Expressive Aphasia;Delayed Responses;Dysarthric   Level of Consciousness Alert   Comments follows 1-step commands   Passive ROM Upper Body   Passive ROM Upper Body WDL   Active ROM Upper Body   Active ROM Upper Body  X   Dominant Hand Left   Comments RUE is flaccid; at baseline pt able to grasp with R   Strength Upper Body   Upper Body Strength  X   Comments RUE- flaccid   Sensation Upper Body   Upper Extremity Sensation  Not Tested   Upper Body Muscle Tone   Upper Body Muscle Tone  X   Neurological Concerns   Neurological Concerns   (CVA 10 yrs ago. New infarct on reent MRI.)   Coordination Upper Body   Coordination X   Balance Assessment   Sitting Balance (Static) Fair -   Sitting Balance (Dynamic) Poor   Standing Balance (Static) Trace +   Standing Balance (Dynamic) Trace   Weight Shift Sitting Poor   Weight Shift Standing Absent   Comments 2-assist   Bed Mobility    Supine to Sit Moderate Assist  (x2)   Scooting Moderate Assist   ADL Assessment   Eating   (TF; pt feeds self ice chips with CGA)   Grooming Maximal Assist   Upper Body Dressing Maximal Assist   Lower Body Dressing Maximal Assist   Toileting Total Assist   How much help from  "another person does the patient currently need...   Putting on and taking off regular lower body clothing? 2   Bathing (including washing, rinsing, and drying)? 1   Toileting, which includes using a toilet, bedpan, or urinal? 1   Putting on and taking off regular upper body clothing? 2   Taking care of personal grooming such as brushing teeth? 2   Eating meals? 2   6 Clicks Daily Activity Score 10   Functional Mobility   Sit to Stand Moderate Assist  (x2)   Bed, Chair, Wheelchair Transfer Maximal Assist   Transfer Method Stand Pivot   Visual Perception   Visual Perception  Not Tested   Activity Tolerance   Sitting in Chair ~ 1 hr   Sitting Edge of Bed 10\"   Standing <1\"   Short Term Goals   Short Term Goal # 1 ADL transfer with Brennan within 5 days   Short Term Goal # 2 Grooming EOB with Brennan within 5 days   Short Term Goal # 3 Feeding with Indep/Spv within 5 days   Short Term Goal # 4 UB dressing with Brennan within 5 days   Education Group   Role of Occupational Therapist Patient Response Patient;Family;Acceptance;Explanation;Verbal Demonstration   Occupational Therapy Initial Treatment Plan    Treatment Interventions Self Care / Activities of Daily Living;Neuro Re-Education / Balance;Therapeutic Activity   Treatment Frequency 3 Times per Week   Duration Until Therapy Goals Met   Problem List   Problem List Decreased Active Daily Living Skills;Decreased Homemaking Skills;Decreased Upper Extremity Strength Right;Decreased Upper Extremity AROM Right;Decreased Functional Mobility;Decreased Activity Tolerance;Safety Awareness Deficits / Cognition;Impaired Coordination Right Upper Extremity;Impaired Postural Control / Balance   Anticipated Discharge Equipment and Recommendations   DC Equipment Recommendations Unable to determine at this time   Discharge Recommendations Recommend post-acute placement for additional occupational therapy services prior to discharge home   Interdisciplinary Plan of Care Collaboration   IDT " Collaboration with  Nursing;Family / Caregiver   Patient Position at End of Therapy Seated;Chair Alarm On;Call Light within Reach;Tray Table within Reach;Phone within Reach   Collaboration Comments NORA RIOS planning.

## 2023-08-15 NOTE — PROGRESS NOTES
"Hospital Medicine Daily Progress Note    Date of Service  8/15/2023    Chief Complaint  Kobe Spear is a 63 y.o. male admitted 8/3/2023 with abdominal pain.    Hospital Course  History of CVA, CKD stage III, DM2, GERD who is traveling through [place] and developed sudden onset severe abdominal pain and presented to the ER where he was found to have markedly elevated LFTs and lipase highly suggestive of gallstone pancreatitis.  Right upper quadrant ultrasound revealed cholelithiasis however no CBD dilation.  General surgery was consulted and recommended MRCP.  He was started on empiric IV antibiotic therapy due to marked leukocytosis .  MRCP showed no evidence of choledocholithiasis only cholelithiasis.  And confirmed acute pancreatitis.  Patient became markedly agitated and was upgraded to the ICU so Precedex could be used.  He was in the ICU 8/5 through 8/10.  He also had respiratory failure secondary to the agitation and required BiPAP high flow nasal cannula.  He has been weaned from this and is now down to 4 L nasal cannula and saturating well.  He remains very anxious and constantly moving and thus wife is at bedside.    Interval Problem Update  Per notes, \"8/11 patient anxious and required restraints overnight.  Seen with wife at bedside.  Patient has no abdominal pain and is tolerating NG tube feeds well.  He is very anxious and tachypneic at time of examination however with repositioning his tachypnea resolved.  He told me he wanted to try and eat cheese today.  He is not having any neurologic changes and the right upper extremity weakness is the same per wife at bedside.  Leukocytosis persists and patient is having elevated temperature.  He had blood cultures done on 8/6 which came back negative.  I will get a chest x-ray as well as repeat blood cultures at this time.  Addendum:  patient with decompensation and higher temp, hr and RR this afternoon.  CXR stable, concern he is again septic.  Blood " cultures, lactic acid, and UA pending.  Discussed with Dr Campa, low threshold to place back in the ICU.  I've added zyvox for broader coverage and will continue with zosyn.    8/12 -patient appears similar to how he did yesterday afternoon.  He had breaking of the fever yesterday but had another fever again today up to 102.  Chest x-ray was done which showed stable changes and actually overall improvement from earlier in hospital stay.  Sodium is elevated at 150 and will start half NS to help bring the sodium down.  CT chest abdomen pelvis currently pending to evaluate for any possible evidence of occult infection.  MRI of the brain given his stroke history and increased lethargy recently.  Zyvox initiated yesterday but WBC count is up to 21.2 this morning.  Blood cultures drawn from yesterday are showing no evidence of growth.  UA is not consistent with infection.  Given his known gallstones there may be occult infection there however he has been on Zosyn with a WBC count that was trending down.  Though there is no evidence on chest x-ray to suspect COVID given his high fevers I will check a respiratory PCR.  8/13 patient actually looks better today compared to the last 36 hours.  He states he has no pain and both I and general surgery palpated his right upper quadrant and he is pain-free at this time.  Having not received any pain medications.  Blood pressure still remains high but has improved with the adjustment of the medications.  His sodium is elevated at 152 and I have started him on D5W as well as increase his free water flushes.  With the initiation of D5 I have also increased his sliding scale insulin coverage.  Patient has been afebrile for the last 24 hours.  We will continue with same antibiotics at this time.  Appreciate general surgery consultation.  8/14 patient much more coherent today and able to have more than 1 word answers when questions were asked.  PT OT to work with him to see how he does  "when he gets out of bed.  General surgery planning on cholecystectomy sometime this week when appropriate.  WBC count is trending down finally and is down to 17.6.  Sodium at 149 from 152 yesterday however sugars are very high and I will discontinue the D5W and just work with free water.  He is starting to show early signs of volume overload thus the discontinuation of fluids.  Blood pressure still is very difficult to control therefore I we will add an additional Norvasc 10 mg daily.  He is still requiring as needed medications for his blood pressure.\"    8/15:   Patient having signs of volume overload, gentle diuresis, reevaluate daily  Per surgery cholecystectomy scheduled on 8/16  Discussed with speech therapy-passed fees okay for diet  Leukocytosis continues to trend down, after thorough chart review antibiotics were broadened due to worsening condition.  Patient is on day 10 of Zosyn and day 5 of linezolid will continue until surgery tomorrow and can likely discontinue afterwards.  Blood sugars have been elevated, likely due to tube feeds, patient now on regular diet/fastings we will monitor overnight and adjust insulin scale       I have discussed this patient's plan of care and discharge plan at IDT rounds today with Case Management, Nursing, Nursing leadership, and other members of the IDT team.    Consultants/Specialty  General surgery    Code Status  Full Code    Disposition  The patient is not medically cleared for discharge to home or a post-acute facility.  Anticipate discharge to: skilled nursing facility    I have placed the appropriate orders for post-discharge needs.    Review of Systems  Review of Systems   Constitutional:  Negative for chills and fever.   HENT:  Negative for congestion and sore throat.    Eyes:  Negative for blurred vision and photophobia.   Respiratory:  Negative for cough and shortness of breath.    Cardiovascular:  Negative for chest pain, claudication and leg swelling. "   Gastrointestinal:  Negative for abdominal pain, constipation, diarrhea, heartburn, nausea and vomiting.   Genitourinary:  Negative for dysuria and hematuria.   Musculoskeletal:  Negative for joint pain and myalgias.   Skin:  Negative for itching and rash.   Neurological:  Negative for dizziness, sensory change, speech change, weakness and headaches.   Psychiatric/Behavioral:  Negative for depression. The patient is not nervous/anxious and does not have insomnia.    All other systems reviewed and are negative.       Physical Exam  Temp:  [36.4 °C (97.6 °F)-37.3 °C (99.2 °F)] 36.8 °C (98.2 °F)  Pulse:  [66-97] 79  Resp:  [15-24] 18  BP: (155-185)/(56-75) 165/69  SpO2:  [94 %-97 %] 97 %    Physical Exam  Vitals and nursing note reviewed.   Constitutional:       General: He is not in acute distress.     Appearance: Normal appearance. He is not diaphoretic.   HENT:      Head: Normocephalic and atraumatic.   Eyes:      General: No scleral icterus.     Extraocular Movements: Extraocular movements intact.   Cardiovascular:      Rate and Rhythm: Normal rate and regular rhythm.      Pulses: Normal pulses.      Heart sounds: Normal heart sounds. No murmur heard.  Pulmonary:      Effort: Pulmonary effort is normal. No respiratory distress.      Breath sounds: Normal breath sounds. No wheezing, rhonchi or rales.      Comments: Intermittently tachypneic when anxious  Abdominal:      General: Abdomen is flat. Bowel sounds are normal. There is no distension.      Palpations: Abdomen is soft.      Tenderness: There is no abdominal tenderness. There is no guarding or rebound.   Musculoskeletal:         General: No swelling or tenderness.      Cervical back: Normal range of motion and neck supple.   Lymphadenopathy:      Cervical: No cervical adenopathy.   Skin:     Coloration: Skin is not jaundiced.      Findings: No erythema.   Neurological:      General: No focal deficit present.      Mental Status: He is alert and oriented to  person, place, and time. Mental status is at baseline.      Cranial Nerves: No cranial nerve deficit.      Motor: No weakness (Right-sided arm hemiparesis).   Psychiatric:         Mood and Affect: Mood normal.         Fluids    Intake/Output Summary (Last 24 hours) at 8/15/2023 1627  Last data filed at 8/15/2023 1507  Gross per 24 hour   Intake 1270 ml   Output 800 ml   Net 470 ml       Laboratory  Recent Labs     08/13/23  0230 08/14/23  0147 08/15/23  0302   WBC 23.7* 17.6* 12.4*   RBC 4.82 4.22* 4.16*   HEMOGLOBIN 12.8* 11.5* 11.1*   HEMATOCRIT 43.5 38.7* 37.5*   MCV 90.2 91.7 90.1   MCH 26.6* 27.3 26.7*   MCHC 29.4* 29.7* 29.6*   RDW 52.6* 53.4* 50.4*   PLATELETCT 288 262 315   MPV 11.5 11.7 11.5     Recent Labs     08/13/23 0230 08/14/23 0147 08/15/23  0302   SODIUM 152* 149* 145   POTASSIUM 3.7 3.8 3.7   CHLORIDE 113* 111 109   CO2 30 29 28   GLUCOSE 252* 327* 257*   BUN 36* 28* 26*   CREATININE 0.93 0.88 0.70   CALCIUM 8.1* 8.1* 8.1*                   Imaging  DX-CHEST-PORTABLE (1 VIEW)   Final Result         Diffuse interstitial prominence could relate to mild fluid overload.      Bilateral pleural effusion with bibasilar atelectasis.      MR-BRAIN-WITH & W/O   Final Result      1.  RIGHT basal ganglia lacunar infarction   2.  No hemorrhage   3.  LEFT supratentorial encephalomalacia as described above with Wallerian degeneration in the ipsilateral brainstem   4.  6 mm frontal meningioma   5.  Atrophy   6.  White matter changes      CT-CHEST,ABDOMEN,PELVIS WITH   Final Result      1.  Acute pancreatitis with peripancreatic fluid but no pancreatic necrosis or pseudocyst      2.  Cholelithiasis      3.  Dilated gallbladder, nonspecific although could indicate cholecystitis in the appropriate clinical setting      4.  Mild bilateral atelectasis and small bilateral pleural effusion      5.  Hepatic steatosis and hepatomegaly      6.  Enteric catheter and bladder catheter appear appropriately located       DX-CHEST-PORTABLE (1 VIEW)   Final Result      1.  Bibasilar atelectasis, less likely pneumonitis.      2.  Small bilateral pleural effusions.      DX-CHEST-LIMITED (1 VIEW)   Final Result      Stable bilateral atelectasis/consolidation.      OF-OPZRIHB-9 VIEW   Final Result      NG tube tip overlies the gastric antrum.      DX-ABDOMEN FOR TUBE PLACEMENT   Final Result      Malpositioned nasogastric tube. The tube extends to the gastroesophageal junction and then reverses course with tip located within the upper thoracic esophagus.      A report was called to the patient's ICU nurse at 2:55 PM on 8/9/2023      US-RUQ   Final Result      1.  Gallstones within the gallbladder. No evidence of biliary ductal dilatation.      2.  The liver is echogenic consistent with fatty change versus hepatocellular dysfunction.      DX-ABDOMEN FOR TUBE PLACEMENT   Final Result      1.  NG tube courses into the fundus of the stomach      DX-CHEST-LIMITED (1 VIEW)   Final Result         Groundglass and airspace opacities bilaterally, slightly worse than prior      DX-CHEST-PORTABLE (1 VIEW)   Final Result      1.  Increased patchy bilateral airspace opacities.   2.  Probable small bilateral pleural effusions.   3.  Interval placement of an enteric feeding tube which courses towards the stomach with the distal tip not visualized.   4.  Stable enlargement of the cardiomediastinal silhouette.      DX-ABDOMEN FOR TUBE PLACEMENT   Final Result      NG tube tip overlies the gastric body.      EC-ECHOCARDIOGRAM COMPLETE W/ CONT   Final Result      DX-CHEST-PORTABLE (1 VIEW)   Final Result      Bibasilar atelectasis and small bilateral pleural effusions, similar to prior study.         CT-CTA CHEST PULMONARY ARTERY W/ RECONS   Final Result         1. No pulmonary embolus.   2. Mild bibasilar airspace disease, atelectasis versus infiltrate   3. Small simple right pleural effusion.   4. Mild ascites in the upper quadrant.   5. Minimal  subsegmental atelectasis in upper lung zones.            DX-CHEST-PORTABLE (1 VIEW)   Final Result      No significant change from prior exam.      TR-COVPYDS-Q/O   Final Result         1. No choledocholithiasis. No CBD dilatation.      2. Cholelithiasis.      3. Fluid surrounding the pancreas, in keeping with acute pancreatitis.      US-RUQ   Final Result      1.  Hepatic steatosis.      2.  Cholelithiasis.      CT-ABDOMEN-PELVIS WITH   Final Result         1.  Cholelithiasis.      2.  Marked acute pancreatitis      DX-CHEST-PORTABLE (1 VIEW)   Final Result      Hypoinflation with borderline cardiomegaly. No lobar consolidation or large pleural effusions.                Assessment/Plan  * Acute biliary pancreatitis without infection or necrosis- (present on admission)  Assessment & Plan  Does have some distention, but no abdominal pain  Last lipase 67, radiographic findings on CT still showing significant pancreatitis with peripancreatic fluid, no evidence of necrosis or pseudocyst  General surgery consulted, follow recommendations  Per surgery cholecystectomy scheduled on 8/16    Volume overload  Assessment & Plan  Patient having signs of volume overload, gentle diuresis, reevaluate daily    CVA (cerebral vascular accident) (HCC)  Assessment & Plan  MRI brain did show a small right-sided lacunar infarct, this should not have resulted in leukocytosis or fevers.  It does appear the patient has had worsening weakness, speech and swallowing, he does have deficits from previous stroke  PT/OT  We will likely need postacute care placement  Discussed with case management if patient is resident of [facility] patient    History of CVA (cerebrovascular accident)  Assessment & Plan  Residual right arm hemiparesis  Aspirin/Plavix to remain on hold as he will need upcoming surgery and this would delay it further.  Please caution with mobilizing  PT OT to continue  MRI brain shows a right basal ganglia lacunar  infarct, no hemorrhage and left supratentorial encephalomalacia likely related to his previous strokes.  Resume statin via NG tube    Stage 3a chronic kidney disease (HCC)- (present on admission)  Assessment & Plan  Avoid/minimize nephrotoxins as much as possible, renally dose medications, monitor inputs and outputs   Daily labs    Type 2 diabetes mellitus with kidney complication, without long-term current use of insulin (HCC)- (present on admission)  Assessment & Plan  Blood sugars have been elevated, likely due to tube feeds, patient now on regular diet/fastings we will monitor overnight and adjust insulin scale   High insulin sliding scale coverage  If blood sugar remains high will likely need long-acting but will hold for now given n.p.o. status    Leukocytosis- (present on admission)  Assessment & Plan  Leukocytosis continues to trend down, after thorough chart review antibiotics were broadened due to worsening condition.    Patient is on day 10 of Zosyn and day 5 of linezolid will continue until surgery tomorrow and can likely discontinue afterwards.  CT chest abdomen pelvis just shows the known gallstones, dilated gallbladder and pancreatitis with peripancreatic fluid, no evidence of abscess or other etiology to explain fevers, leukocytosis  Chest x-ray shows stable edema, improving overall  UA not consistent with infection  Blood cultures collected 8/11 and currently showing no growth    Gastroesophageal reflux disease without esophagitis- (present on admission)  Assessment & Plan  Continue omeprazole    Anxiety- (present on admission)  Assessment & Plan  Continue fluoxetine when no longer NPO  The acute withdrawal of this medication could be contributing to some of his confusion.      Symptomatic cholelithiasis- (present on admission)  Assessment & Plan  Repeat CT chest abdomen pelvis shows no evidence of abscess, he still has significant pancreatitis and peripancreatic fluid but his gallbladder with  palpation is no longer tender.  General surgery was consulted and does not feel that the fevers and the confusion, impulsivity and WBC count is related to an acute cholecystitis.  Per surgery cholecystectomy scheduled on 8/16  General surgery would like him in a more stable condition before taking him to the OR.         VTE prophylaxis:   SCDs/TEDs      I have performed a physical exam and reviewed and updated ROS and Plan today (8/15/2023). In review of yesterday's note (8/14/2023), there are no changes except as documented above.      Total time spent with patient over 53 minutes.  This included my review with nursing and ancillary staff, review of records, face to face interview, physical examination; my review of lab results (CBC, chemistry panel), imaging review (CXR) and ECG.   In addition, counseling patient and speaking with consultants.

## 2023-08-15 NOTE — CARE PLAN
The patient is Stable - Low risk of patient condition declining or worsening    Shift Goals  Clinical Goals: maint SBP below 160, hemodynamic stability, optimal sleep  Patient Goals: get some sleep tonight  Family Goals: restful sleep and active day    Progress made toward(s) clinical / shift goals:    Problem: Pain - Standard  Goal: Alleviation of pain or a reduction in pain to the patient’s comfort goal  Description: Target End Date:  Prior to discharge or change in level of care    Document on Vitals flowsheet    1.  Document pain using the appropriate pain scale per order or unit policy  2.  Educate and implement non-pharmacologic comfort measures (i.e. relaxation, distraction, massage, cold/heat therapy, etc.)  3.  Pain management medications as ordered  4.  Reassess pain after pain med administration per policy  5.  If opiods administered assess patient's response to pain medication is appropriate per POSS sedation scale  6.  Follow pain management plan developed in collaboration with patient and interdisciplinary team (including palliative care or pain specialists if applicable)  Outcome: Progressing     Problem: Knowledge Deficit - Standard  Goal: Patient and family/care givers will demonstrate understanding of plan of care, disease process/condition, diagnostic tests and medications  Description: Target End Date:  1-3 days or as soon as patient condition allows    Document in Patient Education    1.  Patient and family/caregiver oriented to unit, equipment, visitation policy and means for communicating concern  2.  Complete/review Learning Assessment  3.  Assess knowledge level of disease process/condition, treatment plan, diagnostic tests and medications  4.  Explain disease process/condition, treatment plan, diagnostic tests and medications  Outcome: Progressing     Problem: Hemodynamics  Goal: Patient's hemodynamics, fluid balance and neurologic status will be stable or improve  Description: Target End  Date:  Prior to discharge or change in level of care    Document on Assessment and I/O flowsheet templates    1.  Monitor vital signs, pulse oximetry and cardiac monitor per provider order and/or policy  2.  Maintain blood pressure per provider order  3.  Hemodynamic monitoring per provider order  4.  Manage IV fluids and IV infusions  5.  Monitor intake and output  6.  Daily weights per unit policy or provider order  7.  Assess peripheral pulses and capillary refill  8.  Assess color and body temperature  9.  Position patient for maximum circulation/cardiac output  10. Monitor for signs/symptoms of excessive bleeding  11. Assess mental status, restlessness and changes in level of consciousness  12. Monitor temperature and report fever or hypothermia to provider immediately. Consideration of targeted temperature management.  Outcome: Progressing     Problem: Fluid Volume  Goal: Fluid volume balance will be maintained  Description: Target End Date:  Prior to discharge or change in level of care    Document on I/O flowsheet    1.  Monitor intake and output as ordered  2.  Promote oral intake as appropriate  3.  Report inadequate intake or output to physician  4.  Administer IV therapy as ordered  5.  Weights per provider order  6.  Assess for signs and symptoms of bleeding  7.  Monitor for signs of fluid overload (respiratory changes, edema, weight gain, increased abdominal girth)  8.  Monitor of signs for inadequate fluid volume (poor skin turgor, dry mucous membranes)  9.  Instruct patient on adherence to fluid restrictions  Outcome: Progressing     Problem: Urinary - Renal Perfusion  Goal: Ability to achieve and maintain adequate renal perfusion and functioning will improve  Description: Target End Date:  Prior to discharge or change in level of care    Document on I/O and Assessment flowsheet    1.  Urine output will remain greater than 0.5ml/Kg/HR  2.  Monitor amount and/or characteristics of urine per  order/policy. Specific gravity per order/policy  3.  Assess signs and symptoms of renal dysfunction  Outcome: Progressing     Problem: Mechanical Ventilation  Goal: Safe management of artificial airway and ventilation  Description: Target End Date:  when vent discontinued    Document on VAP flowsheet and Airway LDA    1.  Daily awakening trials per provider order/policy  2.  Suctioning and care of ET/Trach tube (document on LDA)  3.  Collaborate with RT to administer medications/treatments  4.  Ambu bag at bedside and available for transport  5.  Trach patient - replacement trach at bedside  6.  Provide communication tools if applicable  Outcome: Progressing  Goal: Successful weaning off mechanical ventilator, spontaneously maintains adequate gas exchange  Description: Target End Date:  when vent discontinued    1.  Follow universal weaning protocol for patients on mechanical ventilation per order  2.  Review contraindication list.  Obtain provider order to wean in presence of contraindication.  3.  Obtain Andi Sedation-Agitation Score  4.  Andi Score 1-2:  sedation vacation  5.  Andi Score 3-4:  Collaborate with provider and/or RT to determine readiness for trial  6.  Begin 2 hour trial of weaning following protocol  7.  Evaluate for fatigue parameters per protocol  8.  Fatigue parameters triggered:  Stop wean and return to previous ASV% setting or increase % minute volume to offset work or breathing  Outcome: Progressing  Goal: Patient will be able to express needs and understand communication  Description: Target End Date:  when vent discontinued    1.  Assess ability to communicate and understand  2.  Provide communication tools  3.  Collaborate with Speech Therapy for [facility]  Outcome: Progressing     Problem: Physical Regulation  Goal: Diagnostic test results will improve  Description: Target End Date:  Prior to discharge or change in level of care    1.  Monitor lactic acid levels  2.  Monitor ABG's  3.   Monitor diagnostic test results  Outcome: Progressing  Goal: Signs and symptoms of infection will decrease  Description: Target End Date:  Prior to discharge or change in level of care    1.  Remove potential routes of infection, such as central lines and urinary catheter  2.  Follow facility protocol for changing IV tubing and sites  3.  Collaborate with Infectious Disease  4.  Antibiotic therapy per provider order  5.  Note drug effects and monitor for antibiotic toxicity  Outcome: Progressing     Problem: Skin Integrity  Goal: Skin integrity is maintained or improved  Description: Target End Date:  Prior to discharge or change in level of care    Document interventions on Skin Risk/Jsutin flowsheet groups and corresponding LDA    1.  Assess and monitor skin integrity, appearance and/or temperature  2.  Assess risk factors for impaired skin integrity and/or pressures ulcers  3.  Implement precautions to protect skin integrity in collaboration with interdisciplinary team  4.  Implement pressure ulcer prevention protocol if at risk for skin breakdown  5.  Confirm wound care consult if at risk for skin breakdown  6.  Ensure patient use of pressure relieving devices  (Low air loss bed, waffle overlay, heel protectors, ROHO cushion, etc)  Outcome: Progressing     Problem: Fall Risk  Goal: Patient will remain free from falls  Description: Target End Date:  Prior to discharge or change in level of care    Document interventions on the [facility] Fall Risk Assessment    1.  Assess for fall risk factors  2.  Implement fall precautions  Outcome: Progressing     Problem: Optimal Care of the Stroke Patient  Goal: Optimal emergency care for the stroke patient  Description: Target End Date:  End of day 1    Time of Onset    1.  Time of last known well obtained  2.  Patient and family/caregiver verbalize understanding of diagnosis, medications and testing  3.  NIHSS performed and documented, including date and time, for  ischemic stroke patients prior to any acute recanalization therapy (thrombolytics or mechanical) or within 12 hours of arrival if no intervention is warranted  4.  Consults and referrals placed to appropriate departments    Medications Administration as Ordered:    1.  Implement appropriate reversal agents for INR greater than 1.5  2.  Pre-alteplase administration of antihypertensives for SBP >185 DBP >110  3.  Post-alteplase administration of antihypertensives for SBP >185, DBP >105  4.  Thrombolytic Therapy for qualifying ischemic stroke patients who arrive within 4.5 hours of time of Last Known Well. Thrombolytic therapy administered within 30 minutes or a documented reason for delay  Outcome: Progressing  Goal: Optimal acute care for the stroke patient  Description: Target End Date:  1 to 3 days    - Vital signs and neuro checks performed and documented per order  - NIHSS completed and documented per order  - Continuous telemetry monitoring for 72 hours or until discontinued by provider  - Head CT without contrast obtained  - Consideration of MRI/MRA  - MRI screening form completed in worklist if MRI ordered  - Echocardiogram with Bubble Study ordered/completed with consideration of ROSALIE  - Carotid Doppler ordered/completed (Not required if CTA of neck completed in ED)  - Lipid Panel obtained within 48 hours of admission  - PT, PTT, INR obtained per Anticoagulation orders (if applicable)  - Antithrombotic therapy by end of hospital day 2 for ischemic stroke. Provider must document reason if contraindicated.  - Venous Thromboembolism (VTE) Prophylaxis by end of hospital day 2 for ischemic and hemorrhagic stroke. Provider must document reason if contraindicated  - Dysphagia screen completed and documented prior to any PO intake. Patient to remain NPO until Speech Therapy evaluation if thrombolytic or thrombectomy performed  - Rehabilitation assessment including PT/OT/SLP evaluations for referral to Physical  Medicine and Rehabilitation services. If none needed, provider needs to document reason  - Neurology consult placed  - Consideration of cardiology consult for cryptogenic strokes  Outcome: Progressing     Problem: Knowledge Deficit - Stroke Education  Goal: Patient's knowledge of stroke and risk factors will improve  Description: Target End Date:  1-3 days or as soon as patient condition allows    Document in Patient Education    1.  Stroke education booklet provided  2.  Education regarding EMS activation, need for follow up, medication prescribed at discharge, risk factors for stroke/lifestyle modifications, warning signs and symptoms of stroke provided  Outcome: Progressing     Problem: Psychosocial - Patient Condition  Goal: Patient's ability to verbalize feelings about condition will improve  Description: Target End Date:  Prior to discharge or change in level of care    1.  Discuss coping with medical condition and its effects  2.  Encourage patient participation in care  3.  Encourage acknowledgement of body changes and accompanying emotions  4.  Perform depression screening  Outcome: Progressing  Goal: Patient's ability to re-evaluate and adapt role responsibilities will improve  Description: Target End Date:  Prior to discharge or change in level of care    1.  Assess family support  2.  Encourage support system participation in care  3.  Encouraged verbalization of feelings regarding caregiver responsibilities  4.  Discuss changes in role and responsibilities caused by patient's condition  Outcome: Progressing     Problem: Discharge Planning - Stroke  Goal: Ensure Stroke Core Measures are met prior to discharge  Description: Target End Date:  Prior to discharge or change in level of care    1. Patient discharged on antithrombotic therapy (Ischemic Stroke)  2. Patient discharged on intensive statin if LDL is greater than or equal to 70 mg/dl (Ischemic Stroke)  3. Patient discharged on anticoagulation  therapy for patients with atrial fibrillation/flutter (Ischemic Stroke)  4. Smoking education/cessation provided if applicable  Outcome: Progressing  Goal: Patient’s continuum of care needs will be met  Description: Target End Date:  Prior to discharge or change in level of care    1.  Potential discharge barriers identified upon admission and throughout hospital stay  2.  Ensure appropriate referrals in place for follow up with specialists after discharge  3.  Ensure appropriate referrals in place for DMEs if applicable  4.  Collaboration with transitional care team and interdisciplinary team to meet discharge needs  5.  Involve patient and family/caregiver in prioritizing goals for discharge planning  6.  Educate patient and caregiver about discharge instructions, medications, and follow up appointments  7.  Referral placed to [facility]  8.  Assure orders and follow up appointments are made for outpatient extended cardiac monitoring if appropriate  Outcome: Progressing     Problem: Neuro Status  Goal: Neuro status will remain stable or improve  Description: Target End Date:  Prior to discharge or change in level of care    Document on Neuro assessment in the Assessment flowsheet    1.  Assess and monitor neurologic status per provider order/protocol/unit policy  2.  Assess level of consciousness and orientation  3.  Assess for speech, dysarthria, dysphagia, facial symmetry  4.  Assess visual field, eye movements, gaze preference, pupil reaction and size  5.  Assess muscle strength and motor response in all four extremities  6.  Assess for sensation (numbness and tingling)  7.  Assess basic neuro reflexes (cough, gag, corneal)  8.  Identify changes in neuro status and report to provider for testing/treatment orders  Outcome: Progressing     Problem: Hemodynamic Monitoring  Goal: Patient's hemodynamics, fluid balance and neurologic status will be stable or improve  Description: Target End Date:  Prior to  discharge or change in level of care    1.  Vital signs, pulse oximetry and cardiac monitor per provider order and/or policy  2.  Frequent pulse checks performed post thrombectomy  3.  Frequent monitoring for signs of bleeding post TPA administration  4.  Proper management of IV infusions  5.  Intake and output monitored per provider order  6.  Daily weight obtained per unit policy or provider order  7.  Peripheral pulses and capillary refill assessed as needed  8.  Monitor for signs/symptoms of excessive bleeding  9.  Body temperature assessed and fevers treated  10. Patient positioned for maximum circulation/cardiac output  Outcome: Progressing     Problem: Respiratory - Stroke Patient  Goal: Patient will achieve/maintain optimum respiratory rate/effort  Description: Target End Date:  Prior to discharge or change in level of care    Document on Assessment flowsheet    1.  Assess and monitor respiratory rate, rhythm, depth and effort of respiration  2.  Oxygenation assessed throughout shift (recommendation of >94% for new stroke patients)  3.  Oxygen administered and/or titrated per order  4.  Collaboration with RT to administer medication/treatments per order  5.  Patient educated on importance of turning, coughing, and deep breathing  6.  Patient positioned for maximum ventilatory efficiency  7.  Airway suctioning provided as needed  8.  Incentive spirometry encouraged 5-10 times every hour or while awake  Outcome: Progressing     Problem: Dysphagia  Goal: Dysphagia will improve  Description: Target End Date:  Prior to discharge or change in level of care    1.  Assess and monitor ability to swallow  2.  Collaborate with Speech Therapy to determine appropriate adaptation for safe administration of medications and oral nutrition  3.  Elevate head of bed to 90 degrees during feedings and for 30 minutes after each feeding  4.  Encourage proper swallowing techniques  5.  Screening on admission or as soon as  possible  Outcome: Progressing     Problem: Risk for Aspiration  Goal: Patient's risk for aspiration will be absent or decrease  Description: Target End Date:  Prior to discharge or change in level of care    1.   Complete dysphagia screening on admission  2.   NPO until dysphagia screening complete or medically cleared  3.   Collaborate with Speech Therapy, Clinical Dietitian and interdisciplinary team  4.   Implement aspiration precautions  5.   Assist patient up to chair for meals  6.   Elevate head of bed 90 degrees if patient is unable to get out of bed  7.   Encourage small bites  8.   Ensure foods/liquids are of appropriate consistency  9.   Assess for any signs/symptoms of aspiration  10. Assess breath sounds and vital signs after oral intake  Outcome: Progressing     Problem: Urinary Elimination  Goal: Establish and maintain regular urinary output  Description: Target End Date:  Prior to discharge or change in level of care    Document on I/O and Assessment flowsheets    1.  Evaluate need to continue indwelling catheter every shift  2.  Assess signs and symptoms of urinary retention  3.  Assess post-void residual volumes  4.  Implement bladder training program  5.  Encourage scheduled voidings  6.  Assist patient to sit on bedside commode or toilet for voiding  7.  Educate patient and family/caregiver on use and purpose of urine collection devices (document in Patient Education)  Outcome: Progressing     Problem: Bowel Elimination  Goal: Establish and maintain regular bowel function  Description: Target End Date:  Prior to discharge or change in level of care    1.   Note date of last BM  2.   Educate about diet, fluid intake, medication and activity to promote bowel function  3.   Educate signs and symptoms of constipation and interventions to implement  4.   Pharmacologic bowel management per provider order  5.   Regular toileting schedule  6.   Upright position for toileting  7.   High fiber diet  8.    Encourage hydration  9.   Collaborate with Clinical Dietician  10. Care and maintenance of ostomy if applicable  Outcome: Progressing     Problem: Mobility - Stroke  Goal: Patient's capacity to carry out activities will improve  Description: Target End Date:  Prior to discharge or change in level of care    1.  Assess for barriers to mobility/activity  2.  Implement activity per interdisciplinary team recommendations  3.  Target activity level identified and patient/family/caregiver aware of goal  4.  Provide assistive devices  5.  Instruct patient/caregiver on proper use of assistive/adaptive devices  6.  Schedule activities and rest periods to decrease effects of fatigue  7.  Encourage mobilization to extent of ability  8.  Maintain proper body alignment  9.  Provide adequate pain management to allow progressive mobilization  10. Implement pace maker precautions as needed  Outcome: Progressing  Goal: Spasticity will be prevented or improved  Description: Target End Date:  Prior to discharge or change in level of care    1.  Muscle relaxing agents considered or administered per order  2.  Splints applied properly and used accordingly to therapist's recommendations  3.  Assistance with stretching and range of motion exercises provided  Outcome: Progressing  Goal: Subluxation will be prevented or improved  Description: Target End Date:  Prior to discharge or change in level of care    1.   Ensure proper handling during transfers, ambulation, and repositioning in bed  2.   Reduce traction to affected limb and provide adequate support of weight  3.   Perform passive range of motion  4.  Assist with active range of motion  Outcome: Progressing     Problem: Self Care  Goal: Patient will have the ability to perform ADLs independently or with assistance (bathe, groom, dress, toilet and feed)  Description: Target End Date:  Prior to discharge or change in level of care    Document on ADL flowsheet    1.  Assess the  capability and level of deficiency to perform ADLs  2.  Encourage family/care giver involvement  3.  Provide assistive devices  4.  Consider PT/OT evaluations  5.  Maintain support, give positive feedback, encourage self-care allowing extra time and verbal cuing as needed  6.  Avoid doing something for patients they can do themselves, but provide assistance as needed  7.  Assist in anticipating/planning individual needs  8.  Collaborate with Case Management and  to meet discharge needs  Outcome: Progressing     Problem: Psychosocial  Goal: Patient's ability to verbalize feelings about condition will improve  Description: Target End Date:  Prior to discharge or change in level of care    1.  Discuss coping with medical condition and its effects  2.  Encourage patient participation in care  3.  Encourage acknowledgement of body changes and accompanying emotions  4.  Perform depression screening  Outcome: Progressing  Goal: Patient's ability to re-evaluate and adapt role responsibilities will improve  Description: Target End Date:  Prior to discharge or change in level of care    1.  Assess family support  2.  Encourage support system participation in care  3.  Encouraged verbalization of feelings regarding caregiver responsibilities  4.  Discuss changes in role and responsibilities caused by patient's condition  Outcome: Progressing  Goal: Patient's level of anxiety will decrease  Description: Target End Date:  1-3 days or as soon as patient condition allows    1.  Collaborate with patient and family/caregiver to identify triggers and develop strategies to cope with anxiety  2.  Implement stimuli reduction, calming techniques  3.  Pharmacologic management per provider order  4.  Encourage patient/family/care giver participation  5.  Collaborate with interdisciplinary team including Psychologist or Behavioral Health Team as needed  Outcome: Progressing  Goal: Patient and family will demonstrate ability  to cope with life altering diagnosis and/or procedure  Description: Target End Date:  1-3 days or as soon as patient condition allows    1. Expect initial shock and disbelief following diagnosis of cancer and traumatizing procedures (disfiguring surgery, colostomy, amputation).  2.  Assess patient and family/caregiver for stage of grief currently being experienced. Explain process as appropriate.  3.  Provide open, nonjudgmental environment. Use therapeutic communication skills of Active-Listening, acknowledgment, and so on.  4.  Encourage verbalization of thoughts or concerns and accept expressions of sadness, anger, rejection. Acknowledge normality of these feelings  5.  Be aware of mood swings, hostility, and other acting-out behavior. Set limits on inappropriate behavior, redirect negative thinking  6.  Be aware of debilitating depression. Ask patient direct questions about state of mind.  7.  Visit frequently and provide physical contact as appropriate, or provide frequent phone support as appropriate for setting. Arrange for care provider and support person to stay with patient as needed  8.  Reinforce teaching regarding disease process and treatments and provide information as appropriate about dying. Be honest; do not give false hope while providing emotional support  9.  Review past life experiences, role changes, and coping skills. Talk about things that interest the patient  Outcome: Progressing  Goal: Spiritual and cultural needs incorporated into hospitalization  Description: Target End Date:  End of day 1    1.  Encourage verbalization of feelings, concerns, expectations and needs  2.  Collaborate with Case Management/  3.  Collaborate with Pastoral Care to meet spiritual needs  Outcome: Progressing     Problem: Communication  Goal: The ability to communicate needs accurately and effectively will improve  Description: Target End Date:  End of day 1    1.  Assess ability to communicate  and understand  2.  Provide augmentative or alternative methods of communication devices  3.  Use /language line as appropriate  4.  Collaborate with Speech Therapy as needed  Outcome: Progressing     Problem: Discharge Barriers/Planning  Goal: Patient's continuum of care needs are met  Description: Target End Date:  Prior to discharge or change in level of care    1.  Identify potential discharge barriers on admission and throughout hospitalization  2.  Collaborate with Case Management, , Clinical Educators, Navigators and others on the transitional care team to meet discharge needs  3.  Involve patient/family/caregivers in setting and prioritizing goals for hospitalization and discharge  4.  Ensure Flu vaccinations are addressed  5.  Inquire if patient is interested in the Meds to Bed program  6.  Ensure patient and family/caregiver are able to demonstrate use of equipment as prescribed  7.  Ensure patient and family/caregiver can verbalize understanding of patient education  8.  Explain discharge instructions and medication reconciliation to patient and family/caregiver  Outcome: Progressing     Problem: Mobility  Goal: Patient's capacity to carry out activities will improve  Description: Target End Date:  Prior to discharge or change in level of care    1.  Assess for barriers to mobility/activity  2.  Implement activity per interdisciplinary team recommendations  3.  Target activity level identified and patient/family/caregiver aware of goal  4.  Provide assistive devices  5.  Instruct patient/caregiver on proper use of assistive/adaptive devices  6.  Schedule activities and rest periods to decrease effects of fatigue  7.  Encourage mobilization to extent of ability  8.  Maintain proper body alignment  9.  Provide adequate pain management to allow progressive mobilization  10. Implement pace maker precautions as needed  Outcome: Progressing       Patient is not progressing towards the  following goals:

## 2023-08-15 NOTE — ASSESSMENT & PLAN NOTE
MRI brain did show a small right-sided lacunar infarct, this should not have resulted in leukocytosis or fevers.  It does appear the patient has had worsening weakness, speech and swallowing, he does have deficits from previous stroke  PT/OT  We will likely need postacute care placement  Discussed with case management if patient is resident of [facility]

## 2023-08-15 NOTE — PROGRESS NOTES
12-hour chart check complete.    Monitor Summary  Rhythm: SR  Rate: 70-90  Ectopy: rare PVC, PAC  Measurements: 0.12/0.10/0.34

## 2023-08-16 NOTE — PROGRESS NOTES
"Critical Care Progress Note    Date of admission  8/3/2023    Chief Complaint  63 y.o. male admitted 8/3/2023 with abdominal pain    Hospital Course  \"63 y.o. male who presented 8/3/2023 with a history of CVA x 5 (2013), CKD stage III, DM, GERD who is reportedly here visiting family and was admitted with severe abdominal pain.  General surgery was consulted and recommended MRCP which was negative for stones.  He has been managed on GSU for his pain and was started on ceftriaxone/metronidazole 8/4 which was escalated to Vanc and zosyn 8/5 overnight with ICU upgrade.  He is unfortunately unable to provide much history due to his strokes.    WBC count has been escalating. AST/ALT are improving, as are the lipase and lactate.  Troponin increased to 120 from baseline of 16.  This morning patient is complaining of severe pain, grimacing but states that the pain is the same as yesterday and not worsening.  He remains strong in his extremities.  His blood gas has improved significantly overnight but he remains a high risk of worsening respiratory failure and possible intubation.\" Dr. Gastelum    8/6: \"Patient again became significantly agitated with tachypnea and tachycardia early this morning.  During this episode of delirium he intermittently desaturated and was uncooperative with supplemental oxygen.  Dr. Kyle reached out to me around 6am with concerns regarding his agitation and respiratory condition and we discussed some ideas for calming him with pain medication and precedex.  He was given dilaudid 0.5mg x 2, ativan x 1 and then placed on a precedex gtt which managed to calm him down. Once calm, his saturations improved and his blood gas this morning looked excellent.  This episode was discussed with his wife and we will try having her come in early tomorrow morning to see if that will mitigate some of his ICU delirium.\" Dr. Gastelum  8/7: on precedex, alert but not oriented at all. Intermittently agitated stating he's " in pain and then he feels scared and panic. Wife bedside to help reorient him but still unable to really understand her.  8/8: overnight, became very somnolent, non-responsive and w/worsening respiratory failure so she was started on bipap after ABG showed respiratory acidosis. This AM, doing better on bipap, able to look at me and mouthing words. Failed attempt to remove off bipap so left it on and let him rest. This afternoon, he is awake on bipap, following commands, calm and resting in bed.   8/9: calm, alert. On HFNC. Hypertensive.     Interval Problem Update  Reviewed last 24 hour events:  Found to be unresponsive this morning and not protecting airway. Stat ABG showed alkalosis. Emergent CTP, CTA head/neck, and CTH completed and showed new left MCA infarct. D/w Dr. Perez and recommended MRI brain for neuroprognostication. Transferred to ICU for possible needing endotracheal intubation. Updated patient's spouse about CT findings. Confirmed code status to be DNR/DNI. EEG ordered to ruled out seizure.        Review of Systems  Review of Systems   Unable to perform ROS: Medical condition        Vital Signs for last 24 hours   Temp:  [36.6 °C (97.9 °F)-37.1 °C (98.8 °F)] 37.1 °C (98.8 °F)  Pulse:  [] 101  Resp:  [19-37] 33  BP: (125-202)/(61-93) 192/79  SpO2:  [90 %-97 %] 93 %    Hemodynamic parameters for last 24 hours       Respiratory Information for the last 24 hours       Physical Exam   Physical Exam  Constitutional:       Appearance: He is ill-appearing.   HENT:      Head: Atraumatic.      Mouth/Throat:      Mouth: Mucous membranes are dry.   Eyes:      Extraocular Movements: Extraocular movements intact.   Cardiovascular:      Rate and Rhythm: Normal rate and regular rhythm.      Heart sounds: Normal heart sounds.   Pulmonary:      Effort: Pulmonary effort is normal. No respiratory distress.      Breath sounds: Rales present. No wheezing.   Abdominal:      Palpations: Abdomen is soft.       Tenderness: There is no abdominal tenderness.   Musculoskeletal:         General: No swelling, tenderness or deformity.   Skin:     General: Skin is warm.   Neurological:      Comments: Awake, calm, alert and responds to simple questions with thumbs up/down         Medications  Current Facility-Administered Medications   Medication Dose Route Frequency Provider Last Rate Last Admin    amLODIPine (Norvasc) tablet 10 mg  10 mg Enteral Tube Q DAY Aaron Fernandez M.D.        furosemide (Lasix) injection 20 mg  20 mg Intravenous BID DIURETIC Aaron Fernandez M.D.   20 mg at 08/16/23 0405    metoprolol tartrate (Lopressor) tablet 100 mg  100 mg Enteral Tube TWICE DAILY Marilee Sanchez D.O.   100 mg at 08/15/23 1814    lisinopril (Prinivil) tablet 40 mg  40 mg Enteral Tube Q DAY FAITH Rodriguez.O.   40 mg at 08/15/23 0505    insulin regular (HumuLIN R,NovoLIN R) injection  3-14 Units Subcutaneous Q6HRS FAITH Rodriguez.O.   4 Units at 08/16/23 1249    And    dextrose 50% (D50W) injection 25 g  25 g Intravenous Q15 MIN PRN Marilee Sanchez D.ONatasha        atorvastatin (Lipitor) tablet 40 mg  40 mg Enteral Tube Nightly FAITH Rodriguez.O.   40 mg at 08/15/23 2122    FLUoxetine (PROzac) capsule 20 mg  20 mg Enteral Tube DAILY Marilee Sanchez D.O.   20 mg at 08/15/23 0506    cloNIDine (Catapres) tablet 0.2 mg  0.2 mg Enteral Tube TWICE DAILY Marilee Sanchez D.O.   0.2 mg at 08/15/23 1815    Pharmacy Consult: Enteral tube insertion - review meds/change route/product selection   Other PHARMACY TO DOSE Diamond Banuelos M.D.        famotidine (Pepcid) tablet 20 mg  20 mg Enteral Tube BID Diamond Banuelos M.D.   20 mg at 08/15/23 1815    Or    famotidine (Pepcid) injection 20 mg  20 mg Intravenous BID Diamond Banuelos M.D.   20 mg at 08/15/23 0506    thiamine (Vitamin B-1) tablet 100 mg  100 mg Enteral Tube DAILY Diamond Banuelos M.D.   100 mg at 08/15/23 0505    acetaminophen (Tylenol) tablet 650 mg  650 mg Enteral  Tube Q4HRS PRN Diamond Banuelos M.D.   650 mg at 08/12/23 1217    hydrALAZINE (Apresoline) injection 10 mg  10 mg Intravenous Q6HRS PRN Yahaira Higuera M.D.   10 mg at 08/16/23 1318    bisacodyl (Dulcolax) suppository 10 mg  10 mg Rectal QDAY PRN Inge Norman M.D.        HYDROmorphone (Dilaudid) injection 1 mg  1 mg Intravenous Q2HRS PRN Yahaira Higuera M.D.   1 mg at 08/16/23 1543    ipratropium-albuterol (DUONEB) nebulizer solution  3 mL Nebulization Q4H PRN (RT) Yahaira Higuera M.D.        Respiratory Therapy Consult   Nebulization Continuous RT Inge Norman M.D.        piperacillin-tazobactam (Zosyn) 3.375 g in  mL IVPB  3.375 g Intravenous Q8HRS Marilee Sanchez D.O. 25 mL/hr at 08/16/23 1252 3.375 g at 08/16/23 1252    labetalol (Normodyne/Trandate) injection 10 mg  10 mg Intravenous Q4HRS PRN Yazmin Gastelum M.D.   10 mg at 08/16/23 1521    Pharmacy Consult Request ...Pain Management Review 1 Each  1 Each Other PHARMACY TO DOSE Paco Munoz M.D.        heparin injection 5,000 Units  5,000 Units Subcutaneous Q8HRS Paco Munoz M.D.   5,000 Units at 08/15/23 2123    ondansetron (Zofran) syringe/vial injection 4 mg  4 mg Intravenous Q4HRS PRN Inge Norman M.D.   4 mg at 08/04/23 0009    prochlorperazine (Compazine) injection 10 mg  10 mg Intravenous Q6HRS PRN Inge Norman M.D.           Fluids    Intake/Output Summary (Last 24 hours) at 8/16/2023 1610  Last data filed at 8/16/2023 1100  Gross per 24 hour   Intake 3163.97 ml   Output 910 ml   Net 2253.97 ml         Laboratory  Recent Labs     08/16/23  0926   ISTATAPH 7.522*   ISTATAPCO2 39.5*   ISTATAPO2 47*   ISTATATCO2 34*   WTHKDTU2NNC 87*   ISTATARTHCO3 32.4*   ISTATARTBE 9*   ISTATTEMP 98.5 F   ISTATSPEC Arterial   ISTATAPHTC 7.523*   NQWHMRHY1ZH 47*           Recent Labs     08/14/23  0147 08/15/23  0302 08/16/23  0040   SODIUM 149* 145 144   POTASSIUM 3.8 3.7 4.0   CHLORIDE 111 109  106   CO2 29 28 24   BUN 28* 26* 28*   CREATININE 0.88 0.70 0.78   MAGNESIUM 2.5  --  2.4   PHOSPHORUS 2.4*  --   --    CALCIUM 8.1* 8.1* 8.3*       Recent Labs     08/14/23  0147 08/15/23  0302 08/16/23  0040   ALTSGPT 60*  --   --    ASTSGOT 47*  --   --    ALKPHOSPHAT 101*  --   --    TBILIRUBIN 0.5  --   --    LIPASE 75  --   --    GLUCOSE 327* 257* 291*       Recent Labs     08/14/23  0147 08/15/23  0302 08/16/23  0040   WBC 17.6* 12.4* 13.1*   ASTSGOT 47*  --   --    ALTSGPT 60*  --   --    ALKPHOSPHAT 101*  --   --    TBILIRUBIN 0.5  --   --        Recent Labs     08/14/23  0147 08/15/23  0302 08/16/23  0040   RBC 4.22* 4.16* 4.40*   HEMOGLOBIN 11.5* 11.1* 11.9*   HEMATOCRIT 38.7* 37.5* 39.5*   PLATELETCT 262 315 413         Imaging  X-Ray:  I have personally reviewed the images and compared with prior images.  CT:    Reviewed  Echo:   pending read    Assessment/Plan  #Acute pancreatitis 2/2 undetermined etiology (possible passed gallstone), no gall stones on MRCP  - Cholecystectomy cancelled given new CVA findings seen on CTH   - Cont zosyn   - Surgery following   - Trend LFTs      #Acute Hypoxic and Hypercapnic respiratory failure  #Aspiration  Plan:  - On zosyn   - HOB elevation  - Aspiration precautions  - Titrate oxygen to an SpO2 of 88-94%  - Airway clearance as needed  - Confirmed code status to be DNR/DNI      #Acute encephalopathy   - Secondary to new CVA given CTH findings   - D/w Dr. Perez and recommended MRI brain   - Check EEG to rule out NCSE   - CVA management    - Daily CAM ICU   - Avoid BZD   - Seek early mobility     #History of CVA with baseline right hemiparesis   # Acute new left MCA infarct   Plan:  - Pending MRI brain   - PT/OT    - On statin for secondary preventiopn   - Need ASA       #CKD III  Plan:  - Monitor UOP  - Avoid nephrotoxins as able  - Monitor renal function on daily labs     #Type II DM  Plan:  - Accuchecks AC/HS  - SSI insulin per scale  - Goal BS < 180     #History of  HTN  Plan:  - Continue lisinopril, clonidine, and metoprolol   - PRN labetalol for now     #GERD  Plan:  - Continue pepcid      I have performed a physical exam and reviewed and updated ROS and Plan today (8/16/2023). In review of yesterday's note (8/15/2023), there are no changes except as documented above.     Discussed patient condition and risk of morbidity and/or mortality with Family, RN, RT, and Pharmacy  The patient remains critically ill.  Critical care time = 45 minutes in directly providing and coordinating critical care and extensive data review.  No time overlap and excludes procedures.

## 2023-08-16 NOTE — THERAPY
"Speech Language Therapy Contact Note    Patient Name: Kobe Spear  Age:  63 y.o., Sex:  male  Medical Record #: [id number]  Today's Date: 8/16/2023    Discussed missed therapy with RN       08/16/23 1502   Treatment Variance   Reason For Missed Therapy Medical - Patient Is Not Medically Stable   Interdisciplinary Plan of Care Collaboration   IDT Collaboration with  Nursing   Collaboration Comments This SLP attempted to see patient again for dysphagia tx session. Per RN and EMR, patient never had surgery today, as he became unstable and transferred to ICU. New Head CT findings of \"1.  Encephalomalacic changes with volume loss in the left hemisphere associated with sulcal enlargement and ex vacuo ventricular dilatation, findings consistent with old infarcts.  2.  Acute infarction involving the right frontal corona radiata, internal capsule, and basal ganglia. The area of hypodensity on today's CT scan is greater than the area of bright diffusion signal seen on MRI study 8/12/2023 suggesting progression of this area of infarction.\" Of note, please do NOT re-order patient's previous dysphagia diet per previous SLP recs, as this is change in status and patient needs to be re-evaluated by SLP when appropriate. Recommend continuing NPO/NGT pending SLP re-assessment as able/appropriate. RN aware. Thank you.       "

## 2023-08-16 NOTE — CARE PLAN
The patient is Watcher - Medium risk of patient condition declining or worsening    Shift Goals  Clinical Goals: Monitor blood sugar lvls, NPO at midnight  Patient Goals: Rest, up to chair  Family Goals: restful sleep and active day    Progress made toward(s) clinical / shift goals: Pt moved from cardiac chair to bed. Pt appears fatigue and tachypenic. Pt on 1L nc sating 92%. Pt and wife at bedside educated on POC, wife verbalizes understanding.     Problem: Knowledge Deficit - Stroke Education  Goal: Patient's knowledge of stroke and risk factors will improve  Outcome: Progressing     Problem: Risk for Aspiration  Goal: Patient's risk for aspiration will be absent or decrease  Outcome: Progressing     Problem: Mobility - Stroke  Goal: Patient's capacity to carry out activities will improve  Outcome: Progressing     Problem: Mobility  Goal: Patient's capacity to carry out activities will improve  Outcome: Progressing       Patient is not progressing towards the following goals:

## 2023-08-16 NOTE — ANESTHESIA PREPROCEDURE EVALUATION
Case: [id number] Date/Time: 08/16/23 1128    Procedure: CHOLECYSTECTOMY, LAPAROSCOPIC    Location:  OR 01 / SURGERY [facility]    Surgeons: Lorraine Eagle M.D.          Relevant Problems   NEURO   (positive) CVA (cerebral vascular accident) (HCC)      GI   (positive) Gastroesophageal reflux disease without esophagitis         (positive) Stage 3a chronic kidney disease (HCC)      ENDO   (positive) Type 2 diabetes mellitus with kidney complication, without long-term current use of insulin (HCC)       Physical Exam    Airway   Mallampati: II  TM distance: >3 FB  Neck ROM: full       Cardiovascular - normal exam  Rhythm: regular  Rate: normal  (-) murmur     Dental - normal exam           Pulmonary - normal exam  Breath sounds clear to auscultation     Abdominal    Neurological - normal exam                 Anesthesia Plan    ASA 2       Plan - general       Airway plan will be ETT          Induction: intravenous    Postoperative Plan: Postoperative administration of opioids is intended.    Pertinent diagnostic labs and testing reviewed    Informed Consent:    Anesthetic plan and risks discussed with patient.    Use of blood products discussed with: patient whom consented to blood products.

## 2023-08-16 NOTE — CARE PLAN
The patient is Watcher - Medium risk of patient condition declining or worsening    Shift Goals  Clinical Goals: Swallow study complete  Patient Goals: Rest, up to chair  Family Goals: restful sleep and active day    Progress made toward(s) clinical / shift goals:    Problem: Risk for Aspiration  Goal: Patient's risk for aspiration will be absent or decrease  Description: Target End Date:  Prior to discharge or change in level of care    1.   Complete dysphagia screening on admission  2.   NPO until dysphagia screening complete or medically cleared  3.   Collaborate with Speech Therapy, Clinical Dietitian and interdisciplinary team  4.   Implement aspiration precautions  5.   Assist patient up to chair for meals  6.   Elevate head of bed 90 degrees if patient is unable to get out of bed  7.   Encourage small bites  8.   Ensure foods/liquids are of appropriate consistency  9.   Assess for any signs/symptoms of aspiration  10. Assess breath sounds and vital signs after oral intake  Outcome: Progressing  Flowsheets (Taken 8/15/2023 1800)  Aspiration Prevention:   Assessed for signs and symptoms of aspiration   Implemented aspiration precautions   Assisted patient up to chair for meals   Supervised feedings   Elevated head of bed to 90 degrees   Collaborated with SLP  Head of Bed Elevated: Greater or equal to 30 degrees  Note: Patient sitting in chair for meals. Supervised eating with pureed foods.     Problem: Self Care  Goal: Patient will have the ability to perform ADLs independently or with assistance (bathe, groom, dress, toilet and feed)  Description: Target End Date:  Prior to discharge or change in level of care    Document on ADL flowsheet    1.  Assess the capability and level of deficiency to perform ADLs  2.  Encourage family/care giver involvement  3.  Provide assistive devices  4.  Consider PT/OT evaluations  5.  Maintain support, give positive feedback, encourage self-care allowing extra time and verbal  cuing as needed  6.  Avoid doing something for patients they can do themselves, but provide assistance as needed  7.  Assist in anticipating/planning individual needs  8.  Collaborate with Case Management and  to meet discharge needs  Outcome: Progressing  Note: Patient will take pureed foods by mouth and sit up in chair at 90degrees for at least 30 minutes after meal is complete.       Patient is not progressing towards the following goals:

## 2023-08-16 NOTE — PROGRESS NOTES
Tele strip at 0901 shows SR at 75.      Measurements from am strip were as follows:  MN=0.15  QRS=0.10  QT=0.40    Tele Shift Summary:    Rhythm : SR  Rate : 79-95  Ectopy : Per CCT Marcell, pt had rare PACs and PVCs.     Telemetry monitoring strips placed in pt's chart.

## 2023-08-16 NOTE — PROGRESS NOTES
Went in to patient room for morning assessment, found patient unresponsive and struggling to breathe. Notified reference nurse, she assessed, notified MD JAKUB assessed and called RRT.

## 2023-08-16 NOTE — PROGRESS NOTES
Telemetry Shift Summary    Rhythm SR/ST  HR Range   Ectopy rPVC, oPAC  Measurements 0.12/0.010/0.34        Normal Values  Rhythm SR  HR Range    Measurements 0.12-0.20 / 0.06-0.10  / 0.30-0.52

## 2023-08-16 NOTE — THERAPY
Speech Language Therapy Contact Note    Patient Name: Kobe Spear  Age:  63 y.o., Sex:  male  Medical Record #: [id number]  Today's Date: 8/16/2023    Discussed missed therapy with RN       08/16/23 1158   Treatment Variance   Reason For Missed Therapy Medical - Patient  in Procedure   Interdisciplinary Plan of Care Collaboration   IDT Collaboration with  Nursing   Collaboration Comments This SLP attempted to see patient for dysphagia tx session. Patient NPO for cholecystectomy today. SLP will hold tx and re-attempt tomorrow as able/appropriate. Thank you.

## 2023-08-16 NOTE — PROGRESS NOTES
1025: Naif from Lab called with critical result of troponin at 216. Critical lab result read back to Naif.   Dr. Trinh notified of critical lab result at 1025.  Critical lab result read back by Dr. Trinh.

## 2023-08-17 NOTE — DISCHARGE SUMMARY
"Death Summary    Cause of Death  Acute hypoxemic respiratory failure due to ischemic stroke due.     Comorbid Conditions at the Time of Death  Principal Problem:    Acute biliary pancreatitis without infection or necrosis (POA: Yes)  Active Problems:    Symptomatic cholelithiasis (POA: Yes)    Anxiety (POA: Yes)    Gastroesophageal reflux disease without esophagitis (POA: Yes)    Leukocytosis (POA: Yes)    Type 2 diabetes mellitus with kidney complication, without long-term current use of insulin (HCC) (POA: Yes)    Stage 3a chronic kidney disease (HCC) (POA: Yes)    History of CVA (cerebrovascular accident) (POA: Unknown)    CVA (cerebral vascular accident) (HCC) (POA: Clinically Undetermined)    Volume overload (POA: Unknown)  Resolved Problems:    * No resolved hospital problems. *      History of Presenting Illness and Hospital Course  This is a 63 y.o. male admitted 8/3/2023 with History of CVA, CKD stage III, DM2, GERD who is traveling through [place] and developed sudden onset severe abdominal pain and presented to the ER where he was found to have markedly elevated LFTs and lipase highly suggestive of gallstone pancreatitis.  Right upper quadrant ultrasound revealed cholelithiasis however no CBD dilation.  General surgery was consulted and recommended MRCP.  He was started on empiric IV antibiotic therapy due to marked leukocytosis .  MRCP showed no evidence of choledocholithiasis only cholelithiasis.  And confirmed acute pancreatitis.  Patient became markedly agitated and was upgraded to the ICU so Precedex could be used.  He was in the ICU 8/5 through 8/10.  He also had respiratory failure secondary to the agitation and required BiPAP high flow nasal cannula.  He has been weaned from this and is now down to 4 L nasal cannula and saturating well.  He remains very anxious and constantly moving and thus wife is at bedside.    Per notes, \"8/11 patient anxious and required restraints overnight.  Seen with wife at " bedside.  Patient has no abdominal pain and is tolerating NG tube feeds well.  He is very anxious and tachypneic at time of examination however with repositioning his tachypnea resolved.  He told me he wanted to try and eat cheese today.  He is not having any neurologic changes and the right upper extremity weakness is the same per wife at bedside.  Leukocytosis persists and patient is having elevated temperature.  He had blood cultures done on 8/6 which came back negative.  I will get a chest x-ray as well as repeat blood cultures at this time.  Addendum:  patient with decompensation and higher temp, hr and RR this afternoon.  CXR stable, concern he is again septic.  Blood cultures, lactic acid, and UA pending.  Discussed with Dr Campa, low threshold to place back in the ICU.  I've added zyvox for broader coverage and will continue with zosyn.    8/12 -patient appears similar to how he did yesterday afternoon.  He had breaking of the fever yesterday but had another fever again today up to 102.  Chest x-ray was done which showed stable changes and actually overall improvement from earlier in hospital stay.  Sodium is elevated at 150 and will start half NS to help bring the sodium down.  CT chest abdomen pelvis currently pending to evaluate for any possible evidence of occult infection.  MRI of the brain given his stroke history and increased lethargy recently.  Zyvox initiated yesterday but WBC count is up to 21.2 this morning.  Blood cultures drawn from yesterday are showing no evidence of growth.  UA is not consistent with infection.  Given his known gallstones there may be occult infection there however he has been on Zosyn with a WBC count that was trending down.  Though there is no evidence on chest x-ray to suspect COVID given his high fevers I will check a respiratory PCR.  8/13 patient actually looks better today compared to the last 36 hours.  He states he has no pain and both I and general surgery  "palpated his right upper quadrant and he is pain-free at this time.  Having not received any pain medications.  Blood pressure still remains high but has improved with the adjustment of the medications.  His sodium is elevated at 152 and I have started him on D5W as well as increase his free water flushes.  With the initiation of D5 I have also increased his sliding scale insulin coverage.  Patient has been afebrile for the last 24 hours.  We will continue with same antibiotics at this time.  Appreciate general surgery consultation.  8/14 patient much more coherent today and able to have more than 1 word answers when questions were asked.  PT OT to work with him to see how he does when he gets out of bed.  General surgery planning on cholecystectomy sometime this week when appropriate.  WBC count is trending down finally and is down to 17.6.  Sodium at 149 from 152 yesterday however sugars are very high and I will discontinue the D5W and just work with free water.  He is starting to show early signs of volume overload thus the discontinuation of fluids.  Blood pressure still is very difficult to control therefore I we will add an additional Norvasc 10 mg daily.  He is still requiring as needed medications for his blood pressure.\"     8/15:   Patient having signs of volume overload, gentle diuresis, reevaluate daily  Per surgery cholecystectomy scheduled on 8/16  Discussed with speech therapy-passed fees okay for diet  Leukocytosis continues to trend down, after thorough chart review antibiotics were broadened due to worsening condition.  Patient is on day 10 of Zosyn and day 5 of linezolid will continue until surgery tomorrow and can likely discontinue afterwards.  Blood sugars have been elevated, likely due to tube feeds, patient now on regular diet/fastings we will monitor overnight and adjust insulin scale      8/16:   Is improved but yesterday patient had acute deterioration in status, mental status this " "morning.  Rapid response was called upon my evaluation at bedside patient was unresponsive, labs, chest x-ray, EKG unremarkable for acute findings.  A stat CT was ordered which showed interval worsening of stroke.  Case was discussed with critical care and patient was transferred to ICU for possible intubation to protect airway.\"    Patient was treated initially for acute cholecystitis and had 2 strokes while in the hospital.  Please see detailed day-to-day interval notes above for details.  Unfortunately patient had a stroke on 8/16 and was found unresponsive.  He was transferred to the ICU for possible intubation and airway protection.  Extensive goals of care discussion with patient's wife, decision was made to proceed with comfort care.  Patient was transitioned to comfort care in the ICU on 8/17.       Death Date: 08/17/23   Death Time: 0545         Pronounced By (RN1): Apolonia  Pronounced By (RN2): Ruchi        "

## 2023-08-18 LAB
BACTERIA BLD CULT: NORMAL
SIGNIFICANT IND 70042: NORMAL
SITE SITE: NORMAL
SOURCE SOURCE: NORMAL

## 2023-08-22 LAB
BACTERIA BLD CULT: NORMAL
SIGNIFICANT IND 70042: NORMAL
SITE SITE: NORMAL
SOURCE SOURCE: NORMAL

## (undated) DEVICE — SODIUM CHL IRRIGATION 0.9% 1000ML (12EA/CA)

## (undated) DEVICE — CLIP MED LG INTNL HRZN TI ESCP - (20/BX)

## (undated) DEVICE — BAG RETRIEVAL 10ML (10EA/BX)

## (undated) DEVICE — TROCAR 5X100 BLADED Z-THREAD - KII (6/BX)

## (undated) DEVICE — CANNULA W/SEAL 5X100 Z-THRE - ADED KII (12/BX)

## (undated) DEVICE — SUTURE 4-0 MONOCRYL PLUS PS-1 - 27 INCH (36/BX)

## (undated) DEVICE — SLEEVE, VASO, THIGH, MED

## (undated) DEVICE — SPONGE GAUZESTER. 2X2 4-PL - (2/PK 50PK/BX 30BX/CS)

## (undated) DEVICE — ELECTRODE DUAL RETURN W/ CORD - (50/PK)

## (undated) DEVICE — GLOVE BIOGEL PI INDICATOR SZ 7.0 SURGICAL PF LF - (50/BX 4BX/CA)

## (undated) DEVICE — SUTURE 0 VICRYL PLUS UR-6 - 27 INCH (36/BX)

## (undated) DEVICE — SET SUCTION/IRRIGATION WITH DISPOSABLE TIP (6/CA )PART #0250-070-520 IS A SUB

## (undated) DEVICE — TROCAR Z THREAD 11 X 100 - BLADED (6/BX)

## (undated) DEVICE — SUTURE 0 COATED VICRYL 6-18IN - (12PK/BX)

## (undated) DEVICE — DRAPESURG STERI-DRAPE LONG - (10/BX 4BX/CA)

## (undated) DEVICE — TUBE CONNECTING SUCTION - CLEAR PLASTIC STERILE 72 IN (50EA/CA)

## (undated) DEVICE — GLOVE SZ 6.5 BIOGEL PI MICRO - PF LF (50PR/BX)

## (undated) DEVICE — TOWEL STOP TIMEOUT SAFETY FLAG (40EA/CA)

## (undated) DEVICE — Device

## (undated) DEVICE — TROCAR LAPSCP 100MM 12MM NTHRD - (6/BX)

## (undated) DEVICE — SUTURE GENERAL

## (undated) DEVICE — CHLORAPREP 26 ML APPLICATOR - ORANGE TINT(25/CA)